# Patient Record
Sex: FEMALE | Race: WHITE | NOT HISPANIC OR LATINO | Employment: OTHER | URBAN - METROPOLITAN AREA
[De-identification: names, ages, dates, MRNs, and addresses within clinical notes are randomized per-mention and may not be internally consistent; named-entity substitution may affect disease eponyms.]

---

## 2017-01-18 ENCOUNTER — ANESTHESIA EVENT (OUTPATIENT)
Dept: GASTROENTEROLOGY | Facility: AMBULARY SURGERY CENTER | Age: 67
End: 2017-01-18
Payer: MEDICARE

## 2017-01-19 ENCOUNTER — ANESTHESIA (OUTPATIENT)
Dept: GASTROENTEROLOGY | Facility: AMBULARY SURGERY CENTER | Age: 67
End: 2017-01-19
Payer: MEDICARE

## 2017-01-19 ENCOUNTER — HOSPITAL ENCOUNTER (OUTPATIENT)
Facility: AMBULARY SURGERY CENTER | Age: 67
Setting detail: OUTPATIENT SURGERY
Discharge: NURSING, REHAB OR LONG-TERM CARE | End: 2017-01-19
Attending: INTERNAL MEDICINE | Admitting: INTERNAL MEDICINE
Payer: MEDICARE

## 2017-01-19 VITALS
HEIGHT: 65 IN | RESPIRATION RATE: 18 BRPM | HEART RATE: 57 BPM | SYSTOLIC BLOOD PRESSURE: 114 MMHG | OXYGEN SATURATION: 93 % | DIASTOLIC BLOOD PRESSURE: 63 MMHG | BODY MASS INDEX: 41.65 KG/M2 | WEIGHT: 250 LBS | TEMPERATURE: 96.2 F

## 2017-01-19 RX ORDER — PROPOFOL 10 MG/ML
INJECTION, EMULSION INTRAVENOUS AS NEEDED
Status: DISCONTINUED | OUTPATIENT
Start: 2017-01-19 | End: 2017-01-19 | Stop reason: SURG

## 2017-01-19 RX ORDER — TAMSULOSIN HYDROCHLORIDE 0.4 MG/1
0.8 CAPSULE ORAL
COMMUNITY

## 2017-01-19 RX ORDER — LEVOTHYROXINE SODIUM 137 UG/1
137 CAPSULE ORAL DAILY
COMMUNITY
End: 2018-08-28 | Stop reason: HOSPADM

## 2017-01-19 RX ORDER — ARIPIPRAZOLE 30 MG/1
30 TABLET ORAL EVERY MORNING
COMMUNITY
End: 2019-01-24

## 2017-01-19 RX ORDER — LISINOPRIL 10 MG/1
10 TABLET ORAL DAILY
COMMUNITY
End: 2018-08-18

## 2017-01-19 RX ORDER — DIVALPROEX SODIUM 250 MG/1
250 TABLET, EXTENDED RELEASE ORAL 2 TIMES DAILY
COMMUNITY

## 2017-01-19 RX ORDER — NITROFURANTOIN MACROCRYSTALS 100 MG/1
100 CAPSULE ORAL DAILY
COMMUNITY
End: 2018-08-18

## 2017-01-19 RX ORDER — PREGABALIN 50 MG/1
50 CAPSULE ORAL 2 TIMES DAILY
COMMUNITY

## 2017-01-19 RX ORDER — SODIUM CHLORIDE, SODIUM LACTATE, POTASSIUM CHLORIDE, CALCIUM CHLORIDE 600; 310; 30; 20 MG/100ML; MG/100ML; MG/100ML; MG/100ML
125 INJECTION, SOLUTION INTRAVENOUS CONTINUOUS
Status: DISCONTINUED | OUTPATIENT
Start: 2017-01-19 | End: 2017-01-19 | Stop reason: HOSPADM

## 2017-01-19 RX ORDER — DOCUSATE SODIUM 100 MG/1
200 CAPSULE, LIQUID FILLED ORAL
COMMUNITY

## 2017-01-19 RX ORDER — FENTANYL CITRATE 50 UG/ML
INJECTION, SOLUTION INTRAMUSCULAR; INTRAVENOUS AS NEEDED
Status: DISCONTINUED | OUTPATIENT
Start: 2017-01-19 | End: 2017-01-19 | Stop reason: SURG

## 2017-01-19 RX ORDER — AMOXICILLIN 250 MG
2 CAPSULE ORAL DAILY
COMMUNITY

## 2017-01-19 RX ORDER — ATORVASTATIN CALCIUM 10 MG/1
10 TABLET, FILM COATED ORAL DAILY
COMMUNITY

## 2017-01-19 RX ORDER — POLYETHYLENE GLYCOL 3350 17 G/17G
17 POWDER, FOR SOLUTION ORAL DAILY
COMMUNITY

## 2017-01-19 RX ORDER — DIVALPROEX SODIUM 250 MG/1
500 TABLET, EXTENDED RELEASE ORAL
COMMUNITY

## 2017-01-19 RX ORDER — AMIODARONE HYDROCHLORIDE 100 MG/1
50 TABLET ORAL DAILY
COMMUNITY
End: 2018-08-28 | Stop reason: HOSPADM

## 2017-01-19 RX ORDER — WARFARIN SODIUM 3 MG/1
3 TABLET ORAL
COMMUNITY
End: 2018-03-06

## 2017-01-19 RX ADMIN — FENTANYL CITRATE 50 MCG: 50 INJECTION, SOLUTION INTRAMUSCULAR; INTRAVENOUS at 11:53

## 2017-01-19 RX ADMIN — PROPOFOL 100 MG: 10 INJECTION, EMULSION INTRAVENOUS at 11:46

## 2017-01-19 RX ADMIN — PROPOFOL 50 MG: 10 INJECTION, EMULSION INTRAVENOUS at 11:55

## 2017-01-19 RX ADMIN — LIDOCAINE HYDROCHLORIDE 40 MG: 20 INJECTION, SOLUTION INTRAVENOUS at 11:46

## 2017-01-19 RX ADMIN — SODIUM CHLORIDE, SODIUM LACTATE, POTASSIUM CHLORIDE, AND CALCIUM CHLORIDE 125 ML/HR: .6; .31; .03; .02 INJECTION, SOLUTION INTRAVENOUS at 11:36

## 2017-01-19 RX ADMIN — FENTANYL CITRATE 50 MCG: 50 INJECTION, SOLUTION INTRAMUSCULAR; INTRAVENOUS at 11:45

## 2017-01-20 ENCOUNTER — TRANSCRIBE ORDERS (OUTPATIENT)
Dept: ADMINISTRATIVE | Facility: HOSPITAL | Age: 67
End: 2017-01-20

## 2017-01-20 DIAGNOSIS — R93.89 ABNORMAL MRI: Primary | ICD-10-CM

## 2017-01-26 ENCOUNTER — HOSPITAL ENCOUNTER (OUTPATIENT)
Dept: RADIOLOGY | Facility: HOSPITAL | Age: 67
Discharge: HOME | End: 2017-01-26
Attending: FAMILY MEDICINE
Payer: MEDICARE

## 2017-01-26 DIAGNOSIS — R93.89 ABNORMAL MRI: ICD-10-CM

## 2017-01-26 PROCEDURE — A9503 TC99M MEDRONATE: HCPCS

## 2017-01-26 PROCEDURE — 78315 BONE IMAGING 3 PHASE: CPT

## 2017-02-06 ENCOUNTER — ALLSCRIPTS OFFICE VISIT (OUTPATIENT)
Dept: OTHER | Facility: OTHER | Age: 67
End: 2017-02-06

## 2017-02-06 DIAGNOSIS — M89.9 DISORDER OF BONE: ICD-10-CM

## 2017-02-15 ENCOUNTER — HOSPITAL ENCOUNTER (OUTPATIENT)
Dept: RADIOLOGY | Age: 67
Discharge: HOME | End: 2017-02-15
Payer: MEDICARE

## 2017-02-15 DIAGNOSIS — R93.7 ABNORMAL MAGNETIC RESONANCE IMAGING OF THORACIC SPINE: ICD-10-CM

## 2017-02-15 PROCEDURE — 82948 REAGENT STRIP/BLOOD GLUCOSE: CPT

## 2017-02-15 PROCEDURE — 78815 PET IMAGE W/CT SKULL-THIGH: CPT

## 2017-02-15 PROCEDURE — A9552 F18 FDG: HCPCS

## 2017-02-15 RX ADMIN — IOHEXOL 5 ML: 240 INJECTION, SOLUTION INTRATHECAL; INTRAVASCULAR; INTRAVENOUS; ORAL at 10:15

## 2017-02-16 LAB — GLUCOSE SERPL-MCNC: 78 MG/DL (ref 65–140)

## 2017-03-13 ENCOUNTER — ALLSCRIPTS OFFICE VISIT (OUTPATIENT)
Dept: OTHER | Facility: OTHER | Age: 67
End: 2017-03-13

## 2017-03-13 DIAGNOSIS — D49.3 NEOPLASM OF BREAST: ICD-10-CM

## 2017-03-21 ENCOUNTER — HOSPITAL ENCOUNTER (OUTPATIENT)
Dept: RADIOLOGY | Facility: HOSPITAL | Age: 67
Discharge: HOME | End: 2017-03-21
Attending: INTERNAL MEDICINE
Payer: MEDICARE

## 2017-03-21 DIAGNOSIS — D49.3 NEOPLASM OF BREAST: ICD-10-CM

## 2017-03-21 PROCEDURE — G0202 SCR MAMMO BI INCL CAD: HCPCS

## 2017-03-22 ENCOUNTER — GENERIC CONVERSION - ENCOUNTER (OUTPATIENT)
Dept: OTHER | Facility: OTHER | Age: 67
End: 2017-03-22

## 2017-09-05 ENCOUNTER — ALLSCRIPTS OFFICE VISIT (OUTPATIENT)
Dept: OTHER | Facility: OTHER | Age: 67
End: 2017-09-05

## 2018-01-14 VITALS
RESPIRATION RATE: 16 BRPM | TEMPERATURE: 97.3 F | DIASTOLIC BLOOD PRESSURE: 80 MMHG | OXYGEN SATURATION: 96 % | HEART RATE: 90 BPM | SYSTOLIC BLOOD PRESSURE: 120 MMHG

## 2018-01-14 VITALS
SYSTOLIC BLOOD PRESSURE: 128 MMHG | TEMPERATURE: 97 F | OXYGEN SATURATION: 98 % | RESPIRATION RATE: 16 BRPM | DIASTOLIC BLOOD PRESSURE: 80 MMHG | HEART RATE: 60 BPM

## 2018-01-14 VITALS
TEMPERATURE: 96.7 F | DIASTOLIC BLOOD PRESSURE: 84 MMHG | HEART RATE: 79 BPM | OXYGEN SATURATION: 95 % | SYSTOLIC BLOOD PRESSURE: 124 MMHG | RESPIRATION RATE: 16 BRPM

## 2018-03-06 ENCOUNTER — OFFICE VISIT (OUTPATIENT)
Dept: HEMATOLOGY ONCOLOGY | Facility: MEDICAL CENTER | Age: 68
End: 2018-03-06
Payer: MEDICARE

## 2018-03-06 VITALS — SYSTOLIC BLOOD PRESSURE: 142 MMHG | TEMPERATURE: 95.3 F | DIASTOLIC BLOOD PRESSURE: 90 MMHG | HEART RATE: 86 BPM

## 2018-03-06 DIAGNOSIS — D63.8 ANEMIA IN OTHER CHRONIC DISEASES CLASSIFIED ELSEWHERE: Primary | ICD-10-CM

## 2018-03-06 PROBLEM — D64.9 ANEMIA: Status: ACTIVE | Noted: 2018-03-06

## 2018-03-06 PROCEDURE — 99214 OFFICE O/P EST MOD 30 MIN: CPT | Performed by: INTERNAL MEDICINE

## 2018-03-06 RX ORDER — AMPICILLIN TRIHYDRATE 500 MG
1 CAPSULE ORAL DAILY
COMMUNITY

## 2018-03-06 RX ORDER — AMLODIPINE BESYLATE 5 MG/1
5 TABLET ORAL DAILY
COMMUNITY
Start: 2018-02-22 | End: 2018-08-28 | Stop reason: HOSPADM

## 2018-03-06 NOTE — PROGRESS NOTES
Luis A Noland  [birth year]  [address] [facility] [phone]    DISCUSSION  SUMMARY:    76-year-old female with multiple medical problems recently found to have abnormalities on MRI T spine that were correlated with a bone scan  Issues:     1  T9 and 10 lesions seen previously  We had discussed biopsy in the past but patient refused  Pain is relatively controlled  Clarification of these lesions may come with # 2     2   Questionable left breast lesions  Patient has been scheduled for bilateral mammogram   The obvious concern would be breast cancer with metastases to the spine      3  CBC abnormalities, mild anemia, thrombocytopenia and mild leukopenia in the past  The most recent CBC was okay/acceptable but is approximately 10month-old  Repeat blood work has been requested      Mrs Darrell Willams is to return in 6 months  If Dr Elma Mccurdy or any other healthcare professional has any questions regarding this patient, my cell phone number is [phone]  Patient knows to call the hematology/oncology office if there are any other questions or concerns  Carefully review your medication list and verify that the list is accurate and up-to-date  Please call the hematology/oncology office if there are medications missing from the list, medications on the list that you are not currently taking or if there is a dosage or instruction that is different from how you're taking that medication  Patient goals and areas of care:  Monitor CBC, clarify questionable breast lesions  Patient is not able to self-care   _____________________________________________________________________________________    Chief Complaint   Patient presents with    Follow-up     Anemia, thrombocytopenia, mild leukopenia     History of Present Illness:    76-year-old female previously referred for the above  Mrs Darrell Willams has multiple sclerosis and is either bed or wheelchair bound  Patient is back for follow-up  As before, Mrs Jd Blanca is very interactive and is able to supply a good amount of her past medical history  Because of back pain, patient previously underwent an MRI of the spine  Results demonstrated abnormalities at T9 and T10  Patient subsequently underwent a bone scan also demonstrating abnormalities at T9 and T10  On previous visits, patient refused additional workup including a biopsy  More recently, patient felt an abnormal lesion in her left breast (patient has felt abnormalities in her breast in the past)  Back pain is minimal, same as before  Activities are extremely limited but no different than before  Appetite is okay, patient is trying to lose weight  No other GI,  or GYN issues  No fevers, chills or sweats  No problems with excessive bruising or bleeding  Review of Systems   Constitutional: Positive for fatigue  HENT: Negative  Eyes: Negative  Respiratory: Negative  Cardiovascular: Negative  Gastrointestinal: Negative  Endocrine: Negative  Genitourinary: Negative  Musculoskeletal: Positive for arthralgias  Skin: Negative  Allergic/Immunologic: Negative  Neurological: Negative  Hematological: Negative  Psychiatric/Behavioral: Negative  All other systems reviewed and are negative  Patient Active Problem List   Diagnosis    Anemia     Past Medical History:   Diagnosis Date    Bipolar disorder ([address]     Disease of thyroid gland     Heart failure ([address] )     afibrillation    Hyperlipidemia     Hypertension     Irregular heart beat     Multiple sclerosis ([address]     Paralysis ([address]     left hemiplegia    Schizophrenia ([address]     Stroke ([address]     left hemiplegia     Past Surgical History:   Procedure Laterality Date    BACK SURGERY      2    CHOLECYSTECTOMY      COLONOSCOPY N/A 1/19/2017    Procedure: COLONOSCOPY;  Surgeon: Anthony Macedo MD;  Location: [facility];   Service:    Nigel Jones ESOPHAGOGASTRODUODENOSCOPY N/A 1/19/2017    Procedure: ESOPHAGOGASTRODUODENOSCOPY (EGD); Surgeon: Lola Floyd MD;  Location: [facility]; Service:     EYE MUSCLE SURGERY Left     HYSTERECTOMY      TONSILLECTOMY       No family history on file  Social History     Social History    Marital status:      Spouse name: N/A    Number of children: N/A    Years of education: N/A     Occupational History    Not on file       Social History Main Topics    Smoking status: Never Smoker    Smokeless tobacco: Not on file    Alcohol use No    Drug use: No    Sexual activity: Not on file     Other Topics Concern    Not on file     Social History Narrative    No narrative on file       Current Outpatient Prescriptions:     Cranberry 450 MG CAPS, Take by mouth, Disp: , Rfl:     rivaroxaban (XARELTO) 15 mg tablet, Take 15 mg by mouth, Disp: , Rfl:     amiodarone 100 mg tablet, Take 100 mg by mouth daily, Disp: , Rfl:     amLODIPine (NORVASC) 5 mg tablet, , Disp: , Rfl:     ARIPiprazole (ABILIFY) 30 mg tablet, Take 30 mg by mouth daily, Disp: , Rfl:     atorvastatin (LIPITOR) 10 mg tablet, Take 10 mg by mouth daily, Disp: , Rfl:     divalproex sodium (DEPAKOTE ER) 250 mg 24 hr tablet, Take 250 mg by mouth daily, Disp: , Rfl:     divalproex sodium (DEPAKOTE ER) 500 mg 24 hr tablet, Take 500 mg by mouth daily, Disp: , Rfl:     docusate sodium (COLACE) 100 mg capsule, Take 200 mg by mouth daily at bedtime, Disp: , Rfl:     levothyroxine 100 mcg tablet, Take 100 mcg by mouth daily, Disp: , Rfl:     lisinopril (ZESTRIL) 10 mg tablet, Take 10 mg by mouth daily, Disp: , Rfl:     nitrofurantoin (MACRODANTIN) 100 mg capsule, Take 100 mg by mouth daily, Disp: , Rfl:     polyethylene glycol (MIRALAX) 17 g packet, Take 17 g by mouth daily, Disp: , Rfl:     pregabalin (LYRICA) 50 mg capsule, Take 50 mg by mouth 2 (two) times a day, Disp: , Rfl:     senna-docusate sodium (SENOKOT S) 8 6-50 mg per tablet, Take 1 tablet by mouth daily, Disp: , Rfl:     tamsulosin (FLOMAX) 0 4 mg, Take 0 8 mg by mouth daily at bedtime, Disp: , Rfl:     Allergies   Allergen Reactions    Ciprocinonide [Fluocinolone] Itching    Darvon [Propoxyphene] Itching    Keflex [Cephalexin] Rash    Lithium Itching    Milk-Related Compounds GI Intolerance    Penicillins Itching    Sulfa Antibiotics Hives    Zithromax [Azithromycin] Rash    Ethylenediamine Tetra-Acetate [Edetic Acid] Itching       Vitals:    03/06/18 1425   BP: 142/90   Pulse: 86   Temp: (!) 95 3 °F (35 2 °C)     Physical Exam   Constitutional: She appears well-developed and well-nourished  Obese female, no respiratory distress   HENT:   Head: Normocephalic and atraumatic  Right Ear: External ear normal    Left Ear: External ear normal    Nose: Nose normal    Mouth/Throat: Oropharynx is clear and moist    Eyes: Conjunctivae and EOM are normal  Pupils are equal, round, and reactive to light  Neck: Normal range of motion  Neck supple  Cardiovascular: Normal rate, regular rhythm, normal heart sounds and intact distal pulses  Pulmonary/Chest: Effort normal and breath sounds normal    Lungs with distant breath sounds bilaterally, few scattered rhonchi   Abdominal: Soft  Bowel sounds are normal    Abdomen is soft, morbidly obese, cannot palpate liver or spleen, no rigidity or rebound, +bowel sounds   Musculoskeletal: Normal range of motion  Neurological:   Motor and sensory is significantly decreased in all extremities, patient's mental status is quite clear although reaction time is slightly delayed, speech is slightly off but not slurred   Skin: Skin is warm  Skin is warm, moist, no petechiae or ecchymoses   Psychiatric: She has a normal mood and affect   Her behavior is normal  Judgment and thought content normal    Breasts:  (Female present) right breast without masses, retraction, redness or dimpling, left breast with irregular lumpiness throughout the breast, no retraction, no redness, breast exam was slightly tender, no axillary adenopathy bilaterally  Extremities:  1+ bilateral lower extremity edema, no cords, pulses are 1+  Lymphatics:  No adenopathy in the neck, supraclavicular region, axilla and groin bilaterally    Labs:     8/31/17 INR = 2 7  8/17/17 WBC = 5 2 hemoglobin = 13 7 hematocrit = 42 platelet = 128  1/9/69 platelet = 62  4/32/06 WBC = 3 6 hemoglobin = 12 9 hematocrit = 39 platelet = 98    Imaging    3/21/17 bilateral mammogram was category 2, benign      1/26/17 three-phase bone scan whole body demonstrated focal radiotracer activity in the lower thoracic spine corresponding to prior MRI abnormalities  Radiologist stated that the findings were nonspecific and inflammation/infection or metastases were possibilities  There were no additional lesions that were concerning for osseous metastatic disease      12/30/16 MRI thoracic spine: No intrinsic spinal cord disease was identified  Patient had advanced multilevel degenerative spondylosis  There was abnormal marrow at T10 and to a lesser degree T9 suspicious for metastatic disease

## 2018-03-22 ENCOUNTER — TRANSCRIBE ORDERS (OUTPATIENT)
Dept: ADMINISTRATIVE | Facility: HOSPITAL | Age: 68
End: 2018-03-22

## 2018-03-22 ENCOUNTER — HOSPITAL ENCOUNTER (OUTPATIENT)
Dept: RADIOLOGY | Facility: HOSPITAL | Age: 68
Discharge: HOME | End: 2018-03-22
Attending: INTERNAL MEDICINE
Payer: MEDICARE

## 2018-03-22 ENCOUNTER — HOSPITAL ENCOUNTER (OUTPATIENT)
Dept: RADIOLOGY | Facility: HOSPITAL | Age: 68
Discharge: HOME | End: 2018-03-22
Payer: MEDICARE

## 2018-03-22 DIAGNOSIS — N64.9 LESION OF BREAST: ICD-10-CM

## 2018-03-22 DIAGNOSIS — R92.8 ABNORMAL MAMMOGRAM: Primary | ICD-10-CM

## 2018-03-22 DIAGNOSIS — N64.9 LESION OF BREAST: Primary | ICD-10-CM

## 2018-03-22 PROCEDURE — 77066 DX MAMMO INCL CAD BI: CPT

## 2018-03-22 PROCEDURE — 76642 ULTRASOUND BREAST LIMITED: CPT

## 2018-04-12 ENCOUNTER — OFFICE VISIT (OUTPATIENT)
Dept: HEMATOLOGY ONCOLOGY | Facility: MEDICAL CENTER | Age: 68
End: 2018-04-12
Payer: MEDICARE

## 2018-04-12 VITALS
RESPIRATION RATE: 16 BRPM | TEMPERATURE: 95.3 F | DIASTOLIC BLOOD PRESSURE: 92 MMHG | SYSTOLIC BLOOD PRESSURE: 140 MMHG | HEART RATE: 87 BPM

## 2018-04-12 DIAGNOSIS — D63.8 ANEMIA IN OTHER CHRONIC DISEASES CLASSIFIED ELSEWHERE: Primary | ICD-10-CM

## 2018-04-12 PROCEDURE — 99214 OFFICE O/P EST MOD 30 MIN: CPT | Performed by: INTERNAL MEDICINE

## 2018-08-06 ENCOUNTER — HOSPITAL ENCOUNTER (OUTPATIENT)
Dept: RADIOLOGY | Facility: HOSPITAL | Age: 68
Discharge: HOME | End: 2018-08-06
Attending: INTERNAL MEDICINE
Payer: MEDICARE

## 2018-08-06 DIAGNOSIS — D63.8 ANEMIA IN OTHER CHRONIC DISEASES CLASSIFIED ELSEWHERE: ICD-10-CM

## 2018-08-06 PROCEDURE — 72146 MRI CHEST SPINE W/O DYE: CPT

## 2018-08-07 ENCOUNTER — TELEPHONE (OUTPATIENT)
Dept: HEMATOLOGY ONCOLOGY | Facility: MEDICAL CENTER | Age: 68
End: 2018-08-07

## 2018-08-18 ENCOUNTER — HOSPITAL ENCOUNTER (INPATIENT)
Facility: HOSPITAL | Age: 68
LOS: 10 days | Discharge: NURSING, REHAB OR LONG-TERM CARE | DRG: 871 | End: 2018-08-28
Attending: EMERGENCY MEDICINE | Admitting: INTERNAL MEDICINE
Payer: MEDICARE

## 2018-08-18 ENCOUNTER — APPOINTMENT (EMERGENCY)
Dept: RADIOLOGY | Facility: HOSPITAL | Age: 68
DRG: 871 | End: 2018-08-18
Payer: MEDICARE

## 2018-08-18 ENCOUNTER — APPOINTMENT (INPATIENT)
Dept: RADIOLOGY | Facility: HOSPITAL | Age: 68
DRG: 871 | End: 2018-08-18
Payer: MEDICARE

## 2018-08-18 DIAGNOSIS — T68.XXXA HYPOTHERMIA: ICD-10-CM

## 2018-08-18 DIAGNOSIS — A41.9 SEPSIS, DUE TO UNSPECIFIED ORGANISM: ICD-10-CM

## 2018-08-18 DIAGNOSIS — J81.1 PULMONARY EDEMA: Primary | ICD-10-CM

## 2018-08-18 DIAGNOSIS — G35 MULTIPLE SCLEROSIS (HCC): Chronic | ICD-10-CM

## 2018-08-18 DIAGNOSIS — J96.02 ACUTE HYPERCAPNIC RESPIRATORY FAILURE (HCC): ICD-10-CM

## 2018-08-18 DIAGNOSIS — D69.6 THROMBOCYTOPENIA (HCC): ICD-10-CM

## 2018-08-18 DIAGNOSIS — D72.819 LEUKOPENIA: ICD-10-CM

## 2018-08-18 DIAGNOSIS — R21 RASH: ICD-10-CM

## 2018-08-18 DIAGNOSIS — E03.9 HYPOTHYROIDISM: ICD-10-CM

## 2018-08-18 DIAGNOSIS — E55.9 VITAMIN D DEFICIENCY: ICD-10-CM

## 2018-08-18 DIAGNOSIS — G92.8 TOXIC METABOLIC ENCEPHALOPATHY: ICD-10-CM

## 2018-08-18 DIAGNOSIS — I50.33 ACUTE ON CHRONIC DIASTOLIC CONGESTIVE HEART FAILURE (HCC): ICD-10-CM

## 2018-08-18 PROBLEM — R65.20 SEVERE SEPSIS (HCC): Status: ACTIVE | Noted: 2018-08-18

## 2018-08-18 PROBLEM — I50.9 ACUTE CONGESTIVE HEART FAILURE (HCC): Status: ACTIVE | Noted: 2018-08-18

## 2018-08-18 PROBLEM — J81.0 ACUTE PULMONARY EDEMA (HCC): Status: ACTIVE | Noted: 2018-08-18

## 2018-08-18 PROBLEM — D69.1 THROMBOCYTOPATHIA (HCC): Status: ACTIVE | Noted: 2018-08-18

## 2018-08-18 PROBLEM — G40.909 SEIZURE DISORDER (HCC): Status: ACTIVE | Noted: 2018-08-18

## 2018-08-18 PROBLEM — J96.01 ACUTE RESPIRATORY FAILURE WITH HYPOXIA AND HYPERCAPNIA (HCC): Status: ACTIVE | Noted: 2018-08-18

## 2018-08-18 PROBLEM — J90 BILATERAL PLEURAL EFFUSION: Status: ACTIVE | Noted: 2018-08-18

## 2018-08-18 PROBLEM — R00.1 BRADYCARDIA: Status: ACTIVE | Noted: 2018-08-18

## 2018-08-18 PROBLEM — J18.9 HCAP (HEALTHCARE-ASSOCIATED PNEUMONIA): Status: ACTIVE | Noted: 2018-08-18

## 2018-08-18 LAB
ALBUMIN SERPL BCP-MCNC: 3.7 G/DL (ref 3.5–5)
ALP SERPL-CCNC: 141 U/L (ref 46–116)
ALT SERPL W P-5'-P-CCNC: 31 U/L (ref 12–78)
AMMONIA PLAS-SCNC: 16 UMOL/L (ref 11–35)
ANION GAP SERPL CALCULATED.3IONS-SCNC: 9 MMOL/L (ref 4–13)
APTT PPP: 39 SECONDS (ref 24–36)
AST SERPL W P-5'-P-CCNC: 26 U/L (ref 5–45)
BACTERIA UR QL AUTO: ABNORMAL /HPF
BASE EX.OXY STD BLDV CALC-SCNC: 71.5 % (ref 60–80)
BASE EX.OXY STD BLDV CALC-SCNC: 95.2 % (ref 60–80)
BASE EXCESS BLDV CALC-SCNC: -3.4 MMOL/L
BASE EXCESS BLDV CALC-SCNC: -4 MMOL/L
BASOPHILS # BLD AUTO: 0.01 THOUSANDS/ΜL (ref 0–0.1)
BASOPHILS NFR BLD AUTO: 0 % (ref 0–1)
BILIRUB SERPL-MCNC: 0.7 MG/DL (ref 0.2–1)
BILIRUB UR QL STRIP: NEGATIVE
BUN SERPL-MCNC: 46 MG/DL (ref 5–25)
CALCIUM SERPL-MCNC: 9.6 MG/DL (ref 8.3–10.1)
CHLORIDE SERPL-SCNC: 112 MMOL/L (ref 100–108)
CLARITY UR: CLEAR
CO2 SERPL-SCNC: 27 MMOL/L (ref 21–32)
COLOR UR: YELLOW
CREAT SERPL-MCNC: 1.76 MG/DL (ref 0.6–1.3)
EOSINOPHIL # BLD AUTO: 0.05 THOUSAND/ΜL (ref 0–0.61)
EOSINOPHIL NFR BLD AUTO: 1 % (ref 0–6)
ERYTHROCYTE [DISTWIDTH] IN BLOOD BY AUTOMATED COUNT: 17.2 % (ref 11.6–15.1)
GFR SERPL CREATININE-BSD FRML MDRD: 29 ML/MIN/1.73SQ M
GLUCOSE SERPL-MCNC: 103 MG/DL (ref 65–140)
GLUCOSE SERPL-MCNC: 78 MG/DL (ref 65–140)
GLUCOSE UR STRIP-MCNC: NEGATIVE MG/DL
HCO3 BLDV-SCNC: 22.4 MMOL/L (ref 24–30)
HCO3 BLDV-SCNC: 24.5 MMOL/L (ref 24–30)
HCT VFR BLD AUTO: 37.1 % (ref 34.8–46.1)
HGB BLD-MCNC: 11.4 G/DL (ref 11.5–15.4)
HGB UR QL STRIP.AUTO: NEGATIVE
IMM GRANULOCYTES # BLD AUTO: 0.02 THOUSAND/UL (ref 0–0.2)
IMM GRANULOCYTES NFR BLD AUTO: 1 % (ref 0–2)
INR PPP: 1.14 (ref 0.86–1.16)
KETONES UR STRIP-MCNC: NEGATIVE MG/DL
LACTATE SERPL-SCNC: 0.9 MMOL/L (ref 0.5–2)
LEUKOCYTE ESTERASE UR QL STRIP: ABNORMAL
LYMPHOCYTES # BLD AUTO: 0.69 THOUSANDS/ΜL (ref 0.6–4.47)
LYMPHOCYTES NFR BLD AUTO: 19 % (ref 14–44)
MCH RBC QN AUTO: 30.6 PG (ref 26.8–34.3)
MCHC RBC AUTO-ENTMCNC: 30.7 G/DL (ref 31.4–37.4)
MCV RBC AUTO: 100 FL (ref 82–98)
MONOCYTES # BLD AUTO: 0.3 THOUSAND/ΜL (ref 0.17–1.22)
MONOCYTES NFR BLD AUTO: 8 % (ref 4–12)
NEUTROPHILS # BLD AUTO: 2.5 THOUSANDS/ΜL (ref 1.85–7.62)
NEUTS SEG NFR BLD AUTO: 71 % (ref 43–75)
NITRITE UR QL STRIP: NEGATIVE
NON-SQ EPI CELLS URNS QL MICRO: ABNORMAL /HPF
NRBC BLD AUTO-RTO: 1 /100 WBCS
NT-PROBNP SERPL-MCNC: 548 PG/ML
O2 CT BLDV-SCNC: 12.6 ML/DL
O2 CT BLDV-SCNC: 14.4 ML/DL
PCO2 BLDV: 46.8 MM HG (ref 42–50)
PCO2 BLDV: 57.4 MM HG (ref 42–50)
PH BLDV: 7.25 [PH] (ref 7.3–7.4)
PH BLDV: 7.3 [PH] (ref 7.3–7.4)
PH UR STRIP.AUTO: 6 [PH] (ref 5–9)
PLATELET # BLD AUTO: 47 THOUSANDS/UL (ref 149–390)
PMV BLD AUTO: 12.8 FL (ref 8.9–12.7)
PO2 BLDV: 119.2 MM HG (ref 35–45)
PO2 BLDV: 46.2 MM HG (ref 35–45)
POTASSIUM SERPL-SCNC: 5.3 MMOL/L (ref 3.5–5.3)
PROT SERPL-MCNC: 7.5 G/DL (ref 6.4–8.2)
PROT UR STRIP-MCNC: NEGATIVE MG/DL
PROTHROMBIN TIME: 11.9 SECONDS (ref 9.4–11.7)
RBC # BLD AUTO: 3.72 MILLION/UL (ref 3.81–5.12)
RBC #/AREA URNS AUTO: ABNORMAL /HPF
SODIUM SERPL-SCNC: 148 MMOL/L (ref 136–145)
SP GR UR STRIP.AUTO: 1.01 (ref 1–1.03)
TROPONIN I SERPL-MCNC: <0.02 NG/ML
TSH SERPL DL<=0.05 MIU/L-ACNC: 7.8 UIU/ML (ref 0.36–3.74)
UROBILINOGEN UR QL STRIP.AUTO: 0.2 E.U./DL
VALPROATE SERPL-MCNC: 34 UG/ML (ref 50–100)
WBC # BLD AUTO: 3.57 THOUSAND/UL (ref 4.31–10.16)
WBC #/AREA URNS AUTO: ABNORMAL /HPF

## 2018-08-18 PROCEDURE — 82948 REAGENT STRIP/BLOOD GLUCOSE: CPT

## 2018-08-18 PROCEDURE — 94660 CPAP INITIATION&MGMT: CPT

## 2018-08-18 PROCEDURE — 85610 PROTHROMBIN TIME: CPT | Performed by: EMERGENCY MEDICINE

## 2018-08-18 PROCEDURE — 82805 BLOOD GASES W/O2 SATURATION: CPT

## 2018-08-18 PROCEDURE — 84484 ASSAY OF TROPONIN QUANT: CPT | Performed by: EMERGENCY MEDICINE

## 2018-08-18 PROCEDURE — 84443 ASSAY THYROID STIM HORMONE: CPT | Performed by: PHYSICIAN ASSISTANT

## 2018-08-18 PROCEDURE — 36415 COLL VENOUS BLD VENIPUNCTURE: CPT

## 2018-08-18 PROCEDURE — 84439 ASSAY OF FREE THYROXINE: CPT | Performed by: PHYSICIAN ASSISTANT

## 2018-08-18 PROCEDURE — 87086 URINE CULTURE/COLONY COUNT: CPT | Performed by: EMERGENCY MEDICINE

## 2018-08-18 PROCEDURE — 80164 ASSAY DIPROPYLACETIC ACD TOT: CPT | Performed by: PHYSICIAN ASSISTANT

## 2018-08-18 PROCEDURE — 83605 ASSAY OF LACTIC ACID: CPT

## 2018-08-18 PROCEDURE — 93005 ELECTROCARDIOGRAM TRACING: CPT

## 2018-08-18 PROCEDURE — 87040 BLOOD CULTURE FOR BACTERIA: CPT

## 2018-08-18 PROCEDURE — 81001 URINALYSIS AUTO W/SCOPE: CPT | Performed by: EMERGENCY MEDICINE

## 2018-08-18 PROCEDURE — 82140 ASSAY OF AMMONIA: CPT | Performed by: PHYSICIAN ASSISTANT

## 2018-08-18 PROCEDURE — 99291 CRITICAL CARE FIRST HOUR: CPT | Performed by: PHYSICIAN ASSISTANT

## 2018-08-18 PROCEDURE — 85730 THROMBOPLASTIN TIME PARTIAL: CPT | Performed by: EMERGENCY MEDICINE

## 2018-08-18 PROCEDURE — 71045 X-RAY EXAM CHEST 1 VIEW: CPT

## 2018-08-18 PROCEDURE — 82805 BLOOD GASES W/O2 SATURATION: CPT | Performed by: EMERGENCY MEDICINE

## 2018-08-18 PROCEDURE — 85025 COMPLETE CBC W/AUTO DIFF WBC: CPT | Performed by: EMERGENCY MEDICINE

## 2018-08-18 PROCEDURE — 96374 THER/PROPH/DIAG INJ IV PUSH: CPT

## 2018-08-18 PROCEDURE — 80053 COMPREHEN METABOLIC PANEL: CPT | Performed by: EMERGENCY MEDICINE

## 2018-08-18 PROCEDURE — 84681 ASSAY OF C-PEPTIDE: CPT | Performed by: PHYSICIAN ASSISTANT

## 2018-08-18 PROCEDURE — 83880 ASSAY OF NATRIURETIC PEPTIDE: CPT

## 2018-08-18 PROCEDURE — 96365 THER/PROPH/DIAG IV INF INIT: CPT

## 2018-08-18 PROCEDURE — 96375 TX/PRO/DX INJ NEW DRUG ADDON: CPT

## 2018-08-18 PROCEDURE — 87077 CULTURE AEROBIC IDENTIFY: CPT | Performed by: EMERGENCY MEDICINE

## 2018-08-18 PROCEDURE — 87186 SC STD MICRODIL/AGAR DIL: CPT | Performed by: EMERGENCY MEDICINE

## 2018-08-18 PROCEDURE — 87081 CULTURE SCREEN ONLY: CPT | Performed by: ANESTHESIOLOGY

## 2018-08-18 PROCEDURE — 70450 CT HEAD/BRAIN W/O DYE: CPT

## 2018-08-18 PROCEDURE — 87449 NOS EACH ORGANISM AG IA: CPT | Performed by: PHYSICIAN ASSISTANT

## 2018-08-18 PROCEDURE — 84145 PROCALCITONIN (PCT): CPT | Performed by: PHYSICIAN ASSISTANT

## 2018-08-18 PROCEDURE — 99291 CRITICAL CARE FIRST HOUR: CPT

## 2018-08-18 RX ORDER — ATORVASTATIN CALCIUM 10 MG/1
10 TABLET, FILM COATED ORAL DAILY
Status: DISCONTINUED | OUTPATIENT
Start: 2018-08-19 | End: 2018-08-28 | Stop reason: HOSPADM

## 2018-08-18 RX ORDER — DIVALPROEX SODIUM 500 MG/1
500 TABLET, EXTENDED RELEASE ORAL
Status: DISCONTINUED | OUTPATIENT
Start: 2018-08-18 | End: 2018-08-28 | Stop reason: HOSPADM

## 2018-08-18 RX ORDER — LEVOTHYROXINE SODIUM 137 UG/1
137 TABLET ORAL
Status: DISCONTINUED | OUTPATIENT
Start: 2018-08-19 | End: 2018-08-19 | Stop reason: CLARIF

## 2018-08-18 RX ORDER — ACETAMINOPHEN 325 MG/1
650 TABLET ORAL EVERY 6 HOURS PRN
COMMUNITY

## 2018-08-18 RX ORDER — LEVOFLOXACIN 5 MG/ML
750 INJECTION, SOLUTION INTRAVENOUS ONCE
Status: COMPLETED | OUTPATIENT
Start: 2018-08-18 | End: 2018-08-18

## 2018-08-18 RX ORDER — VANCOMYCIN HYDROCHLORIDE 1 G/200ML
1000 INJECTION, SOLUTION INTRAVENOUS ONCE
Status: COMPLETED | OUTPATIENT
Start: 2018-08-18 | End: 2018-08-18

## 2018-08-18 RX ORDER — THIAMINE MONONITRATE (VIT B1) 100 MG
100 TABLET ORAL DAILY
COMMUNITY
End: 2019-01-24

## 2018-08-18 RX ORDER — ARIPIPRAZOLE 10 MG/1
30 TABLET ORAL DAILY
Status: DISCONTINUED | OUTPATIENT
Start: 2018-08-19 | End: 2018-08-19

## 2018-08-18 RX ORDER — NITROGLYCERIN 20 MG/100ML
5-200 INJECTION INTRAVENOUS
Status: DISCONTINUED | OUTPATIENT
Start: 2018-08-18 | End: 2018-08-18

## 2018-08-18 RX ORDER — DIVALPROEX SODIUM 250 MG/1
250 TABLET, EXTENDED RELEASE ORAL DAILY
Status: DISCONTINUED | OUTPATIENT
Start: 2018-08-19 | End: 2018-08-28 | Stop reason: HOSPADM

## 2018-08-18 RX ORDER — THIAMINE MONONITRATE (VIT B1) 100 MG
100 TABLET ORAL DAILY
Status: DISCONTINUED | OUTPATIENT
Start: 2018-08-19 | End: 2018-08-28 | Stop reason: HOSPADM

## 2018-08-18 RX ORDER — AMIODARONE HYDROCHLORIDE 100 MG/1
50 TABLET ORAL DAILY
Status: DISCONTINUED | OUTPATIENT
Start: 2018-08-19 | End: 2018-08-18

## 2018-08-18 RX ORDER — POLYVINYL ALCOHOL 14 MG/ML
1 SOLUTION/ DROPS OPHTHALMIC 2 TIMES DAILY
COMMUNITY
End: 2018-09-12

## 2018-08-18 RX ORDER — FUROSEMIDE 10 MG/ML
40 INJECTION INTRAMUSCULAR; INTRAVENOUS ONCE
Status: COMPLETED | OUTPATIENT
Start: 2018-08-18 | End: 2018-08-18

## 2018-08-18 RX ORDER — ALBUMIN, HUMAN INJ 5% 5 %
12.5 SOLUTION INTRAVENOUS ONCE
Status: COMPLETED | OUTPATIENT
Start: 2018-08-18 | End: 2018-08-18

## 2018-08-18 RX ADMIN — METRONIDAZOLE 500 MG: 500 INJECTION, SOLUTION INTRAVENOUS at 22:04

## 2018-08-18 RX ADMIN — VANCOMYCIN HYDROCHLORIDE 1000 MG: 1 INJECTION, SOLUTION INTRAVENOUS at 20:23

## 2018-08-18 RX ADMIN — CEFEPIME HYDROCHLORIDE 2000 MG: 2 INJECTION, POWDER, FOR SOLUTION INTRAVENOUS at 23:13

## 2018-08-18 RX ADMIN — ALBUMIN HUMAN 12.5 G: 0.05 INJECTION, SOLUTION INTRAVENOUS at 22:12

## 2018-08-18 RX ADMIN — FUROSEMIDE 40 MG: 10 INJECTION, SOLUTION INTRAMUSCULAR; INTRAVENOUS at 20:20

## 2018-08-18 RX ADMIN — LEVOFLOXACIN 750 MG: 5 INJECTION, SOLUTION INTRAVENOUS at 20:13

## 2018-08-18 RX ADMIN — NITROGLYCERIN 5 MCG/MIN: 20 INJECTION INTRAVENOUS at 19:32

## 2018-08-18 RX ADMIN — SODIUM CHLORIDE 1000 ML: 0.9 INJECTION, SOLUTION INTRAVENOUS at 19:35

## 2018-08-18 NOTE — ED NOTES
Chest xray done at bedside  Client placed on bipap upon arrival     Gudeliadanny NavarroEn[facility]  08/18/18 [id number]

## 2018-08-18 NOTE — ED NOTES
MD aware that fluids are only 1000cc ordered and if client is indeed septic protocol calls for more  MD ordered to hold off now     Kimi Pedroza, JACQUELIN  08/18/18 1956

## 2018-08-19 ENCOUNTER — APPOINTMENT (INPATIENT)
Dept: RADIOLOGY | Facility: HOSPITAL | Age: 68
DRG: 871 | End: 2018-08-19
Payer: MEDICARE

## 2018-08-19 PROBLEM — G40.909 SEIZURE DISORDER (HCC): Chronic | Status: ACTIVE | Noted: 2018-08-18

## 2018-08-19 PROBLEM — G35 MULTIPLE SCLEROSIS (HCC): Chronic | Status: ACTIVE | Noted: 2018-08-18

## 2018-08-19 PROBLEM — M89.9 BONE LESION: Status: ACTIVE | Noted: 2017-09-05

## 2018-08-19 LAB
ALBUMIN SERPL BCP-MCNC: 3.3 G/DL (ref 3.5–5)
ALP SERPL-CCNC: 111 U/L (ref 46–116)
ALT SERPL W P-5'-P-CCNC: 27 U/L (ref 12–78)
ANION GAP SERPL CALCULATED.3IONS-SCNC: 10 MMOL/L (ref 4–13)
ARTERIAL PATENCY WRIST A: YES
AST SERPL W P-5'-P-CCNC: 21 U/L (ref 5–45)
BASE EXCESS BLDA CALC-SCNC: -1.6 MMOL/L
BASE EXCESS BLDA CALC-SCNC: -1.8 MMOL/L
BASE EXCESS BLDA CALC-SCNC: -2.6 MMOL/L
BASE EXCESS BLDA CALC-SCNC: -3.1 MMOL/L
BASOPHILS # BLD AUTO: 0.01 THOUSANDS/ΜL (ref 0–0.1)
BASOPHILS NFR BLD AUTO: 0 % (ref 0–1)
BILIRUB SERPL-MCNC: 0.6 MG/DL (ref 0.2–1)
BODY TEMPERATURE: 93.8 DEGREES FEHRENHEIT
BODY TEMPERATURE: 96.2 DEGREES FEHRENHEIT
BODY TEMPERATURE: 96.6 DEGREES FEHRENHEIT
BODY TEMPERATURE: 99.2 DEGREES FEHRENHEIT
BUN SERPL-MCNC: 46 MG/DL (ref 5–25)
CALCIUM SERPL-MCNC: 8.8 MG/DL (ref 8.3–10.1)
CHLORIDE SERPL-SCNC: 114 MMOL/L (ref 100–108)
CO2 SERPL-SCNC: 25 MMOL/L (ref 21–32)
CREAT SERPL-MCNC: 1.68 MG/DL (ref 0.6–1.3)
EOSINOPHIL # BLD AUTO: 0.03 THOUSAND/ΜL (ref 0–0.61)
EOSINOPHIL NFR BLD AUTO: 1 % (ref 0–6)
ERYTHROCYTE [DISTWIDTH] IN BLOOD BY AUTOMATED COUNT: 17.2 % (ref 11.6–15.1)
GFR SERPL CREATININE-BSD FRML MDRD: 31 ML/MIN/1.73SQ M
GLUCOSE SERPL-MCNC: 118 MG/DL (ref 65–140)
GLUCOSE SERPL-MCNC: 127 MG/DL (ref 65–140)
GLUCOSE SERPL-MCNC: 134 MG/DL (ref 65–140)
GLUCOSE SERPL-MCNC: 49 MG/DL (ref 65–140)
GLUCOSE SERPL-MCNC: 50 MG/DL (ref 65–140)
GLUCOSE SERPL-MCNC: 51 MG/DL (ref 65–140)
GLUCOSE SERPL-MCNC: 64 MG/DL (ref 65–140)
GLUCOSE SERPL-MCNC: 64 MG/DL (ref 65–140)
GLUCOSE SERPL-MCNC: 66 MG/DL (ref 65–140)
GLUCOSE SERPL-MCNC: 67 MG/DL (ref 65–140)
GLUCOSE SERPL-MCNC: 70 MG/DL (ref 65–140)
GLUCOSE SERPL-MCNC: 73 MG/DL (ref 65–140)
GLUCOSE SERPL-MCNC: 79 MG/DL (ref 65–140)
GLUCOSE SERPL-MCNC: 82 MG/DL (ref 65–140)
GLUCOSE SERPL-MCNC: 83 MG/DL (ref 65–140)
GLUCOSE SERPL-MCNC: 91 MG/DL (ref 65–140)
HCO3 BLDA-SCNC: 23.6 MMOL/L (ref 22–28)
HCO3 BLDA-SCNC: 23.7 MMOL/L (ref 22–28)
HCO3 BLDA-SCNC: 23.9 MMOL/L (ref 22–28)
HCO3 BLDA-SCNC: 24.2 MMOL/L (ref 22–28)
HCT VFR BLD AUTO: 32.6 % (ref 34.8–46.1)
HGB BLD-MCNC: 10.1 G/DL (ref 11.5–15.4)
IMM GRANULOCYTES # BLD AUTO: 0.02 THOUSAND/UL (ref 0–0.2)
IMM GRANULOCYTES NFR BLD AUTO: 1 % (ref 0–2)
IPAP: 12
IPAP: 14
L PNEUMO1 AG UR QL IA.RAPID: NEGATIVE
LYMPHOCYTES # BLD AUTO: 0.58 THOUSANDS/ΜL (ref 0.6–4.47)
LYMPHOCYTES NFR BLD AUTO: 21 % (ref 14–44)
MAGNESIUM SERPL-MCNC: 1.8 MG/DL (ref 1.6–2.6)
MCH RBC QN AUTO: 30.7 PG (ref 26.8–34.3)
MCHC RBC AUTO-ENTMCNC: 31 G/DL (ref 31.4–37.4)
MCV RBC AUTO: 99 FL (ref 82–98)
MONOCYTES # BLD AUTO: 0.33 THOUSAND/ΜL (ref 0.17–1.22)
MONOCYTES NFR BLD AUTO: 12 % (ref 4–12)
NASAL CANNULA: 2
NASAL CANNULA: ABNORMAL
NEUTROPHILS # BLD AUTO: 1.77 THOUSANDS/ΜL (ref 1.85–7.62)
NEUTS SEG NFR BLD AUTO: 65 % (ref 43–75)
NON VENT- BIPAP: ABNORMAL
NON VENT- BIPAP: ABNORMAL
NRBC BLD AUTO-RTO: 1 /100 WBCS
O2 CT BLDA-SCNC: 14.1 ML/DL (ref 16–23)
O2 CT BLDA-SCNC: 14.2 ML/DL (ref 16–23)
O2 CT BLDA-SCNC: 14.4 ML/DL (ref 16–23)
O2 CT BLDA-SCNC: 14.8 ML/DL (ref 16–23)
OXYHGB MFR BLDA: 92.4 % (ref 94–97)
OXYHGB MFR BLDA: 92.5 % (ref 94–97)
OXYHGB MFR BLDA: 94.7 % (ref 94–97)
OXYHGB MFR BLDA: 95.2 % (ref 94–97)
PCO2 BLDA: 43.5 MM HG (ref 36–44)
PCO2 BLDA: 46.6 MM HG (ref 36–44)
PCO2 BLDA: 46.9 MM HG (ref 36–44)
PCO2 BLDA: 50.5 MM HG (ref 36–44)
PCO2 TEMP ADJ BLDA: 41.5 MM HG (ref 36–44)
PCO2 TEMP ADJ BLDA: 44.3 MM HG (ref 36–44)
PCO2 TEMP ADJ BLDA: 44.9 MM HG (ref 36–44)
PCO2 TEMP ADJ BLDA: 47.2 MM HG (ref 36–44)
PEEP MAX SETTING VENT: 5 CM[H2O]
PEEP MAX SETTING VENT: 6 CM[H2O]
PH BLD: 7.33 [PH] (ref 7.35–7.45)
PH BLD: 7.33 [PH] (ref 7.35–7.45)
PH BLD: 7.34 [PH] (ref 7.35–7.45)
PH BLD: 7.37 [PH] (ref 7.35–7.45)
PH BLDA: 7.29 [PH] (ref 7.35–7.45)
PH BLDA: 7.32 [PH] (ref 7.35–7.45)
PH BLDA: 7.33 [PH] (ref 7.35–7.45)
PH BLDA: 7.36 [PH] (ref 7.35–7.45)
PHOSPHATE SERPL-MCNC: 3.8 MG/DL (ref 2.3–4.1)
PLATELET # BLD AUTO: 52 THOUSANDS/UL (ref 149–390)
PMV BLD AUTO: 13 FL (ref 8.9–12.7)
PO2 BLD: 69.7 MM HG (ref 75–129)
PO2 BLD: 77 MM HG (ref 75–129)
PO2 BLD: 84.5 MM HG (ref 75–129)
PO2 BLD: 84.6 MM HG (ref 75–129)
PO2 BLDA: 75.5 MM HG (ref 75–129)
PO2 BLDA: 76 MM HG (ref 75–129)
PO2 BLDA: 90.6 MM HG (ref 75–129)
PO2 BLDA: 99.4 MM HG (ref 75–129)
POTASSIUM SERPL-SCNC: 4.9 MMOL/L (ref 3.5–5.3)
PROCALCITONIN SERPL-MCNC: <0.05 NG/ML
PROT SERPL-MCNC: 6.5 G/DL (ref 6.4–8.2)
RBC # BLD AUTO: 3.29 MILLION/UL (ref 3.81–5.12)
S PNEUM AG UR QL: NEGATIVE
SODIUM SERPL-SCNC: 149 MMOL/L (ref 136–145)
SPECIMEN SOURCE: ABNORMAL
T4 FREE SERPL-MCNC: 1.41 NG/DL (ref 0.76–1.46)
VENT BIPAP FIO2: 40 %
VENT BIPAP FIO2: 40 %
WBC # BLD AUTO: 2.74 THOUSAND/UL (ref 4.31–10.16)

## 2018-08-19 PROCEDURE — 84100 ASSAY OF PHOSPHORUS: CPT | Performed by: PHYSICIAN ASSISTANT

## 2018-08-19 PROCEDURE — 83735 ASSAY OF MAGNESIUM: CPT | Performed by: PHYSICIAN ASSISTANT

## 2018-08-19 PROCEDURE — 94664 DEMO&/EVAL PT USE INHALER: CPT

## 2018-08-19 PROCEDURE — 82805 BLOOD GASES W/O2 SATURATION: CPT | Performed by: PHYSICIAN ASSISTANT

## 2018-08-19 PROCEDURE — 94760 N-INVAS EAR/PLS OXIMETRY 1: CPT

## 2018-08-19 PROCEDURE — 99291 CRITICAL CARE FIRST HOUR: CPT | Performed by: ANESTHESIOLOGY

## 2018-08-19 PROCEDURE — 85025 COMPLETE CBC W/AUTO DIFF WBC: CPT | Performed by: PHYSICIAN ASSISTANT

## 2018-08-19 PROCEDURE — 82948 REAGENT STRIP/BLOOD GLUCOSE: CPT

## 2018-08-19 PROCEDURE — 82805 BLOOD GASES W/O2 SATURATION: CPT | Performed by: NURSE PRACTITIONER

## 2018-08-19 PROCEDURE — 71045 X-RAY EXAM CHEST 1 VIEW: CPT

## 2018-08-19 PROCEDURE — 80053 COMPREHEN METABOLIC PANEL: CPT | Performed by: PHYSICIAN ASSISTANT

## 2018-08-19 PROCEDURE — 36600 WITHDRAWAL OF ARTERIAL BLOOD: CPT

## 2018-08-19 RX ORDER — TAMSULOSIN HYDROCHLORIDE 0.4 MG/1
0.8 CAPSULE ORAL
Status: DISCONTINUED | OUTPATIENT
Start: 2018-08-19 | End: 2018-08-28 | Stop reason: HOSPADM

## 2018-08-19 RX ORDER — MAGNESIUM SULFATE HEPTAHYDRATE 40 MG/ML
2 INJECTION, SOLUTION INTRAVENOUS ONCE
Status: COMPLETED | OUTPATIENT
Start: 2018-08-19 | End: 2018-08-19

## 2018-08-19 RX ORDER — DEXTROSE MONOHYDRATE 25 G/50ML
25 INJECTION, SOLUTION INTRAVENOUS ONCE
Status: COMPLETED | OUTPATIENT
Start: 2018-08-19 | End: 2018-08-19

## 2018-08-19 RX ORDER — NYSTATIN 100000 [USP'U]/G
1 POWDER TOPICAL 3 TIMES DAILY
Status: DISCONTINUED | OUTPATIENT
Start: 2018-08-19 | End: 2018-08-28 | Stop reason: HOSPADM

## 2018-08-19 RX ORDER — POLYVINYL ALCOHOL 14 MG/ML
1 SOLUTION/ DROPS OPHTHALMIC 2 TIMES DAILY
Status: DISCONTINUED | OUTPATIENT
Start: 2018-08-19 | End: 2018-08-19 | Stop reason: CLARIF

## 2018-08-19 RX ORDER — ALBUTEROL SULFATE 2.5 MG/3ML
2.5 SOLUTION RESPIRATORY (INHALATION) EVERY 4 HOURS PRN
Status: DISCONTINUED | OUTPATIENT
Start: 2018-08-19 | End: 2018-08-28 | Stop reason: HOSPADM

## 2018-08-19 RX ORDER — DEXTROSE MONOHYDRATE 100 MG/ML
25 INJECTION, SOLUTION INTRAVENOUS CONTINUOUS
Status: DISCONTINUED | OUTPATIENT
Start: 2018-08-19 | End: 2018-08-21

## 2018-08-19 RX ORDER — AMOXICILLIN 250 MG
2 CAPSULE ORAL DAILY
Status: DISCONTINUED | OUTPATIENT
Start: 2018-08-19 | End: 2018-08-20

## 2018-08-19 RX ORDER — DEXTROSE, SODIUM CHLORIDE, SODIUM LACTATE, POTASSIUM CHLORIDE, AND CALCIUM CHLORIDE 5; .6; .31; .03; .02 G/100ML; G/100ML; G/100ML; G/100ML; G/100ML
50 INJECTION, SOLUTION INTRAVENOUS CONTINUOUS
Status: DISCONTINUED | OUTPATIENT
Start: 2018-08-19 | End: 2018-08-19

## 2018-08-19 RX ORDER — DEXTROSE MONOHYDRATE 25 G/50ML
25 INJECTION, SOLUTION INTRAVENOUS AS NEEDED
Status: DISCONTINUED | OUTPATIENT
Start: 2018-08-19 | End: 2018-08-28 | Stop reason: HOSPADM

## 2018-08-19 RX ADMIN — DEXTROSE MONOHYDRATE 25 ML: 25 INJECTION, SOLUTION INTRAVENOUS at 17:16

## 2018-08-19 RX ADMIN — LEVOTHYROXINE SODIUM 137 MCG: 112 TABLET ORAL at 06:36

## 2018-08-19 RX ADMIN — DEXTRAN 70 AND HYPROMELLOSE 2910 1 DROP: 1; 3 SOLUTION/ DROPS OPHTHALMIC at 17:16

## 2018-08-19 RX ADMIN — MAGNESIUM SULFATE HEPTAHYDRATE 2 G: 40 INJECTION, SOLUTION INTRAVENOUS at 07:31

## 2018-08-19 RX ADMIN — DEXTROSE MONOHYDRATE 25 ML: 25 INJECTION, SOLUTION INTRAVENOUS at 19:11

## 2018-08-19 RX ADMIN — METRONIDAZOLE 500 MG: 500 INJECTION, SOLUTION INTRAVENOUS at 05:08

## 2018-08-19 RX ADMIN — NYSTATIN 1 APPLICATION: 100000 POWDER TOPICAL at 22:00

## 2018-08-19 RX ADMIN — METRONIDAZOLE 500 MG: 500 INJECTION, SOLUTION INTRAVENOUS at 21:12

## 2018-08-19 RX ADMIN — RIVAROXABAN 15 MG: 15 TABLET, FILM COATED ORAL at 08:39

## 2018-08-19 RX ADMIN — DEXTROSE MONOHYDRATE 25 ML: 25 INJECTION, SOLUTION INTRAVENOUS at 09:03

## 2018-08-19 RX ADMIN — DEXTROSE MONOHYDRATE 25 ML: 25 INJECTION, SOLUTION INTRAVENOUS at 07:30

## 2018-08-19 RX ADMIN — METRONIDAZOLE 500 MG: 500 INJECTION, SOLUTION INTRAVENOUS at 13:36

## 2018-08-19 RX ADMIN — SENNOSIDES AND DOCUSATE SODIUM 2 TABLET: 8.6; 5 TABLET ORAL at 08:39

## 2018-08-19 RX ADMIN — CEFEPIME HYDROCHLORIDE 2000 MG: 2 INJECTION, POWDER, FOR SOLUTION INTRAVENOUS at 22:02

## 2018-08-19 RX ADMIN — ALBUTEROL SULFATE 2.5 MG: 2.5 SOLUTION RESPIRATORY (INHALATION) at 07:53

## 2018-08-19 RX ADMIN — DEXTROSE MONOHYDRATE 25 ML: 25 INJECTION, SOLUTION INTRAVENOUS at 14:38

## 2018-08-19 RX ADMIN — Medication 100 MG: at 08:36

## 2018-08-19 RX ADMIN — CEFEPIME HYDROCHLORIDE 2000 MG: 2 INJECTION, POWDER, FOR SOLUTION INTRAVENOUS at 11:13

## 2018-08-19 RX ADMIN — DEXTROSE, SODIUM CHLORIDE, SODIUM LACTATE, POTASSIUM CHLORIDE, AND CALCIUM CHLORIDE 50 ML/HR: 5; .6; .31; .03; .02 INJECTION, SOLUTION INTRAVENOUS at 15:47

## 2018-08-19 RX ADMIN — DEXTRAN 70 AND HYPROMELLOSE 2910 1 DROP: 1; 3 SOLUTION/ DROPS OPHTHALMIC at 11:13

## 2018-08-19 RX ADMIN — ATORVASTATIN CALCIUM 10 MG: 10 TABLET, FILM COATED ORAL at 08:36

## 2018-08-19 RX ADMIN — SODIUM CHLORIDE 50 ML/HR: 234 INJECTION INTRAMUSCULAR; INTRAVENOUS; SUBCUTANEOUS at 19:25

## 2018-08-19 RX ADMIN — DEXTROSE 50 ML/HR: 10 SOLUTION INTRAVENOUS at 22:06

## 2018-08-19 RX ADMIN — DIVALPROEX SODIUM 250 MG: 500 TABLET, EXTENDED RELEASE ORAL at 08:37

## 2018-08-19 RX ADMIN — ARIPIPRAZOLE 30 MG: 10 TABLET ORAL at 08:37

## 2018-08-19 RX ADMIN — NYSTATIN 1 APPLICATION: 100000 POWDER TOPICAL at 15:48

## 2018-08-19 RX ADMIN — NYSTATIN 1 APPLICATION: 100000 POWDER TOPICAL at 08:36

## 2018-08-19 NOTE — H&P
History and Physical - Critical Care/ Franky Pantoja 70 y o  female MRN: [id number]  Unit/Bed#: ICU 03 Encounter: [id number]    Reason for Admission / Chief Complaint:  Cyanosis and altered mental status    History of Present Illness:  Maximino Hardin is a 70 y o  female who presents  to [facility] Emergency Department from a skilled nursing facility for appearance of cyanosis with altered mental status  She was report of some shortness of breath skilled nursing facility where she lives and reported be hypoxic though there was no oxygen saturation provided history  She began complaining of shortness of breath at approximately 15:00 and EMS was summoned approximately 19:00  Past medical history of multiple sclerosis, physical disability, seizure disorder, history of atrial fibrillation on NOAC Eliquis as an outpatient, Hx of CVA w/ chronic Mild L hemiparesis,  HTN, history of documented heart failure no echocardiogram to verify status, hypothyroidism, psychiatric disorder, mild thrombocytopenia  She is pending an ongoing workup for possible metastatic lesions to her spine and breast with Dr Idalia Mtz and was scheduled to follow up on 08/29/2018  Yeimi Medrano [facility] [place] for the last 3-4 years in assisted living  She Has a history of both manic and depressive bipolar symptoms  Her sister provides additional history that over the past 2 months the patient has had become less responsive  She reports that the patient still is able to verbalize, however, especially at night the patient slurs her speech more often and sometimes speaks unintelligibly  Her concern is suspect for possibly over medicating  In the emergency department the patient was treated with BiPAP therapy for acute pulmonary edema and hypoventilation  She was placed on Tridil for her acute pulmonary edema which was down titrated downward then off due to hypotension      She was also treated for sepsis secondary to hospital-acquired pneumonia with positive markers of leukopenia, hypothermia, thrombocytopenia, though, patient lactic acid was normal   She was treated with Levaquin and vancomycin with coverage for pneumococcus MRSA pneumonia and covered for gram-negative pneumonia with cefepime  CT of the head was performed due to persistent alteration in mental status despite correction of respiratory acidosis  CT of the head was grossly normal for acute pathology  She was transferred to ICU on BiPAP therapy 12/6 and 50%    History obtained from chart review and unobtainable from patient due to mental status  Past Medical History:  Past Medical History:   Diagnosis Date    Atrial fibrillation [facility] )     Bipolar disorder ([facility] )     Disease of thyroid gland     Heart failure ([facility] )     afibrillation    Hyperlipidemia     Hypertension     Irregular heart beat     Multiple sclerosis (HCC)     Paralysis (HCC)     left hemiplegia    Psychiatric disorder     depression, schizophrenia    Schizophrenia ([facility] )     Stroke ([facility] )     left hemiplegia       Past Surgical History:  Past Surgical History:   Procedure Laterality Date    BACK SURGERY      2    CHOLECYSTECTOMY      COLONOSCOPY N/A 1/19/2017    Procedure: COLONOSCOPY;  Surgeon: Addi Garza MD;  Location: [place] [address] GI LAB; Service:     ESOPHAGOGASTRODUODENOSCOPY N/A 1/19/2017    Procedure: ESOPHAGOGASTRODUODENOSCOPY (EGD); Surgeon: Addi Garza MD;  Location: [place] [address] GI LAB; Service:     EYE MUSCLE SURGERY Left     HYSTERECTOMY      TONSILLECTOMY         Past Family History:  History reviewed  No pertinent family history  Social History:  History   Smoking Status    Never Smoker   Smokeless Tobacco    Never Used      History   Alcohol Use No     History   Drug Use No     Marital Status:    Exercise History:  Not applicable    Medications:  Current Facility-Administered Medications   Medication Dose Route Frequency    [START ON 8/19/2018] ARIPiprazole (ABILIFY) tablet 30 mg  30 mg Oral Daily    [START ON 8/19/2018] atorvastatin (LIPITOR) tablet 10 mg  10 mg Oral Daily    cefepime (MAXIPIME) 2,000 mg in dextrose 5 % 50 mL IVPB  2,000 mg Intravenous Q12H    [START ON 8/19/2018] divalproex sodium (DEPAKOTE ER) 24 hr tablet 250 mg  250 mg Oral Daily    divalproex sodium (DEPAKOTE ER) 24 hr tablet 500 mg  500 mg Oral HS    [START ON 8/19/2018] levothyroxine tablet 137 mcg  137 mcg Oral Early Morning    metroNIDAZOLE (FLAGYL) IVPB (premix) 500 mg  500 mg Intravenous Q8H    [START ON 8/19/2018] thiamine (VITAMIN B1) tablet 100 mg  100 mg Oral Daily     Home medications:  Prior to Admission medications    Medication Sig Start Date End Date Taking?  Authorizing Provider   acetaminophen (TYLENOL) 325 mg tablet Take 650 mg by mouth every 6 (six) hours as needed for mild pain   Yes Historical Provider, MD   amiodarone 100 mg tablet Take 50 mg by mouth daily     Yes Historical Provider, MD   amLODIPine (NORVASC) 5 mg tablet Take 5 mg by mouth daily   2/22/18  Yes Historical Provider, MD   ARIPiprazole (ABILIFY) 30 mg tablet Take 30 mg by mouth daily   Yes Historical Provider, MD   atorvastatin (LIPITOR) 10 mg tablet Take 10 mg by mouth daily   Yes Historical Provider, MD   Cranberry 450 MG CAPS Take 1 capsule by mouth daily     Yes Historical Provider, MD   Dextromethorphan-Guaifenesin (ROBITUSSIN DM PO) Take 10 mL by mouth every 8 (eight) hours   Yes Historical Provider, MD   divalproex sodium (DEPAKOTE ER) 250 mg 24 hr tablet Take 250 mg by mouth daily   Yes Historical Provider, MD   divalproex sodium (DEPAKOTE ER) 500 mg 24 hr tablet Take 500 mg by mouth daily at bedtime     Yes Historical Provider, MD   docusate sodium (COLACE) 100 mg capsule Take 200 mg by mouth daily at bedtime   Yes Historical Provider, MD   Levothyroxine Sodium 137 MCG CAPS Take 137 mcg by mouth daily     Yes Historical Provider, MD   polyethylene glycol (MIRALAX) 17 g packet Take 17 g by mouth daily   Yes Historical Provider, MD   polyvinyl alcohol (LIQUIFILM TEARS) 1 4 % ophthalmic solution Administer 1 drop to both eyes 2 (two) times a day   Yes Historical Provider, MD   pregabalin (LYRICA) 50 mg capsule Take 50 mg by mouth 2 (two) times a day   Yes Historical Provider, MD   rivaroxaban (XARELTO) 15 mg tablet Take 15 mg by mouth   Yes Historical Provider, MD   senna-docusate sodium (SENOKOT S) 8 6-50 mg per tablet Take 2 tablets by mouth daily     Yes Historical Provider, MD   tamsulosin (FLOMAX) 0 4 mg Take 0 8 mg by mouth daily at bedtime   Yes Historical Provider, MD   thiamine (VITAMIN B1) 100 mg tablet Take 100 mg by mouth daily   Yes Historical Provider, MD   lisinopril (ZESTRIL) 10 mg tablet Take 10 mg by mouth daily  18  Historical Provider, MD   nitrofurantoin (MACRODANTIN) 100 mg capsule Take 100 mg by mouth daily  18  Historical Provider, MD     Allergies: Allergies   Allergen Reactions    Ciprocinonide [Fluocinolone] Itching    Darvon [Propoxyphene] Itching    Keflex [Cephalexin] Rash    Lithium Itching    Milk-Related Compounds GI Intolerance    Penicillins Itching    Sulfa Antibiotics Hives    Zithromax [Azithromycin] Rash    Ethylenediamine Tetra-Acetate [Edetic Acid] Itching       ROS:   Review of Systems   Reason unable to perform ROS: Due to patient mental status  Vitals:  Vitals:    18 2030 18 2045   BP: 131/60 90/50 98/57 102/57   BP Location: Right arm      Pulse: (!) 51 (!) 50 (!) 50 58   Resp:  (!) 8 (!) 8 (!) 10   Temp:  (!) 92 7 °F (33 7 °C)     TempSrc:  Tympanic     SpO2:  96% 96% 98%   Weight:         Temperature:   Temp (24hrs), Av 3 °F (33 5 °C), Min:92 °F (33 3 °C), Max:92 7 °F (33 7 °C)    Current Temperature: (!) 92 7 °F (33 7 °C)    Weights:   IBW: -92 5 kg  Body mass index is 41 6 kg/m²      Non-Invasive/Invasive Ventilation Settings:  Respiratory    Lab Data (Last 4 hours) None         O2/Vent Data (Last 4 hours)      08/18 1930          Non-Invasive Ventilation Mode BiPAP                 No results found for: PHART, LML5YMN, PO2ART, DOC3OPE, A4GDPWGD, BEART, SOURCE  BiPAP:  12/6:  50%     Physical Exam:  Physical Exam   Constitutional: She appears well-developed and well-nourished  HENT:   Head: Normocephalic and atraumatic  Eyes: Conjunctivae and EOM are normal  Pupils are equal, round, and reactive to light  Neck: Normal range of motion  Neck supple  No tracheal deviation present  No thyromegaly present  Cardiovascular: S1 normal and normal pulses  An irregularly irregular rhythm present  Bradycardia present  Exam reveals no gallop and no friction rub  Pulses:       Radial pulses are 2+ on the right side, and 2+ on the left side  Dorsalis pedis pulses are 2+ on the right side, and 2+ on the left side  Pulmonary/Chest: No respiratory distress  She has decreased breath sounds  She has no wheezes  She has no rales  She exhibits no tenderness  Abdominal: Soft  Bowel sounds are normal  She exhibits no distension  There is no tenderness  Musculoskeletal: Normal range of motion  Neurological: She exhibits normal muscle tone  GCS eye subscore is 3  GCS verbal subscore is 2  GCS motor subscore is 4  Patient has chronic left hemiplegia   Skin: Skin is warm and dry  Capillary refill takes less than 2 seconds  Psychiatric: Her speech is delayed and slurred  She is slowed  Patient is lethargic   Vitals reviewed        Labs:    Results from last 7 days  Lab Units 08/18/18 1915   WBC Thousand/uL 3 57*   HEMOGLOBIN g/dL 11 4*   HEMATOCRIT % 37 1   PLATELETS Thousands/uL 47*   NEUTROS PCT % 71   MONOS PCT % 8        Results from last 7 days  Lab Units 08/18/18 1915   SODIUM mmol/L 148*   POTASSIUM mmol/L 5 3   CHLORIDE mmol/L 112*   CO2 mmol/L 27   BUN mg/dL 46*   CREATININE mg/dL 1 76*   CALCIUM mg/dL 9 6   TOTAL PROTEIN g/dL 7 5   BILIRUBIN TOTAL mg/dL 0 70 ALK PHOS U/L 141*   ALT U/L 31   AST U/L 26   GLUCOSE RANDOM mg/dL 78                Results from last 7 days  Lab Units 08/18/18 1915   INR  1 14   PTT seconds 39*       Results from last 7 days  Lab Units 08/18/18 1915   LACTIC ACID mmol/L 0 9       0  Lab Value Date/Time   TROPONINI <0 02 08/18/2018 1915       Imaging: CXR: Prominent central vasculature and bilateral airspace disease likely on the basis of pulmonary edema  Small bibasilar pleural effusions  I have personally reviewed pertinent reports  and I have personally reviewed pertinent films in PACS  CT:  Head:  No acute disease, notation for history of old stroke I have personally reviewed pertinent reports  and I have personally reviewed pertinent films in PACS  EKG:  Atrial fibrillation with rate of 69 beats per minute with T-wave inversions in the lateral leads  This was personally reviewed by myself  Micro:  Blood Culture: No results found for: BLOODCX  Urine Culture:   Lab Results   Component Value Date    URINECX >100,000 cfu/ml Mixed Contaminants X6 09/28/2016    URINECX >100,000 cfu/ml Pseudomonas aeruginosa 06/23/2016     Sputum Culture: No components found for: SPUTUMCX  Wound Culure: No results found for: Anna Marie Chambers  ______________________________________________________________________    Assessment:   Active Problems:    Acute respiratory failure with hypoxia and hypercapnia (HCC)    Sepsis (HCC)    Toxic metabolic encephalopathy    Leukopenia    Thrombocytopenia (HCC)    HCAP (healthcare-associated pneumonia)    Multiple sclerosis (HCC)    Seizure disorder (HCC)    Acute pulmonary edema (HCC)    Bilateral pleural effusion    Hypothermia    Bradycardia  Resolved Problems:    * No resolved hospital problems   *      Bradycardia   Assessment & Plan    -non symptomatic  -likely secondary to hypothermia  -rewarming  -hold amiodarone for today due to bradycardia, can resume once non bradycardic        Hypothermia   Assessment & Plan -likely related to sepsis  -initiated passive rewarming measures with Greer Hugger        Bilateral pleural effusion   Assessment & Plan    -uncertain cause at this time  -questionable parapneumonic versus possibly related to processes for which patient is being worked up as an outpatient ?   Possible metastatic cancer lesions  -diuresed in the emergency department with 40 of IV Lasix  -following for response:  Given the setting of sepsis patient may require volume repletion        Acute pulmonary edema (HCC)   Assessment & Plan    -possibly heart failure related given documented history of heart failure  -status post diuresis in emergency department and nitroglycerin with improvement of hypoxia  -echocardiogram for assessment of cardiac function  -trend urine output  -BiPAP therapy        Seizure disorder ([facility] )   Assessment & Plan    -continues on Depakote for such  -trend therapeutic levels Depakote        Multiple sclerosis ([facility])   Assessment & Plan    -patient has a history of multiple sclerosis  -not actively treated at this time          HCAP (healthcare-associated pneumonia)   Assessment & Plan    -treating for pneumococcus as well as gram-negative pneumonia, also covering for anaerobic components for aspiration risk, and MRSA coverage  -likely treatment for approximately 5 days, will trend procalcitonin and taper duration and antibiotic therapy to cultures and sensitivities and procalcitonin is        Thrombocytopenia ([facility])   Assessment & Plan    -acute on chronic thrombocytopenia  -likely related to sepsis  -no active bleeding  -hold anticoagulation due to thrombocytopenia and risk for bleeding        Leukopenia   Assessment & Plan    -likely sepsis related  -absolute neutrophil count within normal limits, non neutropenic        Toxic metabolic encephalopathy   Assessment & Plan    -most likely sepsis related  -follow-up on TSH, Depakote, ammonia levels in search of causes for other source        [facility] ([facility] Assessment & Plan    -secondary to hospital-acquired pneumonia, suspect Gram-negative, covering for both pneumococcal and MRSA pneumonia  -secondary contributory component of UTI, follow up on urinary cultures        Acute respiratory failure with hypoxia and hypercapnia (HCC)   Assessment & Plan    -improved oxygen saturation and respiratory acidosis with BiPAP  -continue BiPAP 12/6/50% and titrate to SpO2 95% or greater            Family:  · Family updated: yes   Sister Baldemar Clements who is power-of- was updated at bedside  She provided her contacted information with her cell phone number as [phone] and her home phone number [phone] with requests to be updated with any acute changes and the patient's status  Her and I discussed the patient's previous wishes for her advance directives as DNR DNI  Disposition: Admit to ICU  Sister, who is also POA was contacted in the emergency department  Advanced directives were verified and patient is currently a level 3 at this time, though family is questioning whether not to reverse this  Family is en route to hospital     Counseling / Coordination of Care  Total Critical Care time spent 50 minutes excluding procedures, teaching and family updates  ______________________________________________________________________    VTE Pharmacologic Prophylaxis: Reason for no pharmacologic prophylaxis Risk for bleeding due to thrombocytopenia  VTE Mechanical Prophylaxis: sequential compression device    Invasive lines and devices:   Invasive Devices     Peripheral Intravenous Line            Peripheral IV 08/18/18 Left Antecubital less than 1 day    Peripheral IV 08/18/18 Left Forearm less than 1 day    Peripheral IV 08/18/18 Left Hand less than 1 day          Drain            Urethral Catheter Non-latex 16 Fr  less than 1 day                Code Status: Level 3 - DNAR and DNI  POA:    POLST:      Given critical illness, patient length of stay will require greater than two midnights      Signature: Mercy Mccartney PA-C  Date: 8/18/2018

## 2018-08-19 NOTE — ASSESSMENT & PLAN NOTE
· Improved today:  Acute on chronic per history  · TSH, depakote, levels wnl  · depakote and Abilify have been held w/o significant improvement  · Neuro consult to see patient tomorrow     · Consider MRI of brain

## 2018-08-19 NOTE — ASSESSMENT & PLAN NOTE
· secondary to probable hospital-acquired pneumonia, suspect Gram-negative, covering for both pneumococcal and MRSA pneumonia

## 2018-08-19 NOTE — ASSESSMENT & PLAN NOTE
· Cont to hold amiodarone due to bradycardia, can resume once non bradycardic  · Asymptomatic, less episodes have been noted

## 2018-08-19 NOTE — ED PROVIDER NOTES
History  Chief Complaint   Patient presents with    Chest Pain    Shortness of Breath     started at 3 pm today     HPI    50-year-old female with history of CHF, AFib on Xarelto, MS who presents today with shortness of breath and chest pain  Patient resides in a nursing home  Around 3:00 p m  today she expressed to the nurse that she was having shortness of breath while she was in bed  She was found cyanotic around the lips in the evening  Unsure pulse ox at that time  Patient was immediately placed on nasal cannula and brought to the emergency department  Patient had rales on exam along with significant edema in her lower extremities  Patient has complained at that time was chest pain or shortness of breath  Patient was alert and oriented  Moving all extremities  Patient does have history of stroke  Patient was immediately placed on BiPAP with settings 12/6  Patient was hypothermic with a rectal temperature of 92°  Anabel Nones was placed  I obtained an ultrasound-guided IV in the left AC  Nitroglycerin was started for pulmonary edema  Will do a septic workup  Patient's paperwork shows she is a DNR DNI will respect her wishes but will call family with her POA is her sister Rocky Bowman  Patient is a new initial VBG showed respiratory acidosis  Shortly after patient was less responsive was able to open her eyes and verbalized intermittently  Concern for possible head bleed since patient is on Xarelto unsure about recent fall  CT scan was performed which was negative for any intracranial hemorrhage  Patient's VBG was repeated again with slight improvement  Patient will require ICU admission which I discussed with Dr Jada Hoang  With the hypothermia concern for possible infectious source  Unsure if possibly of pneumonia or urinary tract infection  Will start patient on broad-spectrum antibiotics  Place patient on a Anabel Nones her    I spoke with the patient's POA who would like to follow patient's wishes of a DNR DNI  Will continue patient on BiPAP Lasix and nitro drip broad-spectrum antibiotics admit patient to ICU  Prior to Admission Medications   Prescriptions Last Dose Informant Patient Reported? Taking?    ARIPiprazole (ABILIFY) 30 mg tablet 8/18/2018 at Unknown time Other (Specify) Yes Yes   Sig: Take 30 mg by mouth daily   Cranberry 450 MG CAPS 8/18/2018 at Unknown time Outside Facility ([address] [place] [address]) Yes Yes   Sig: Take 1 capsule by mouth daily     Dextromethorphan-Guaifenesin (ROBITUSSIN DM PO) 8/18/2018 at [address] [place] [address] (Specify) Yes Yes   Sig: Take 10 mL by mouth every 8 (eight) hours   Levothyroxine Sodium 137 MCG CAPS 8/18/2018 at Unknown time Outside Facility ([address] [place] [address]) Yes Yes   Sig: Take 137 mcg by mouth daily     acetaminophen (TYLENOL) 325 mg tablet 8/18/2018 at [address] [place] [address] (Specify) Yes Yes   Sig: Take 650 mg by mouth every 6 (six) hours as needed for mild pain   amLODIPine (NORVASC) 5 mg tablet 8/18/2018 at Unknown time Outside Facility ([address] [place] [address]) Yes Yes   Sig: Take 5 mg by mouth daily     amiodarone 100 mg tablet 8/18/2018 at Unknown time Outside Facility ([address] [place] [address]) Yes Yes   Sig: Take 50 mg by mouth daily     atorvastatin (LIPITOR) 10 mg tablet 8/17/2018 at Unknown time  Yes Yes   Sig: Take 10 mg by mouth daily   divalproex sodium (DEPAKOTE ER) 250 mg 24 hr tablet 8/18/2018 at Unknown time Other (Specify) Yes Yes   Sig: Take 250 mg by mouth daily   divalproex sodium (DEPAKOTE ER) 500 mg 24 hr tablet 8/17/2018 at Unknown time Outside Facility (Specify) Yes Yes   Sig: Take 500 mg by mouth daily at bedtime     docusate sodium (COLACE) 100 mg capsule 8/17/2018 at Unknown time Other (Specify) Yes Yes   Sig: Take 200 mg by mouth daily at bedtime   polyethylene glycol (MIRALAX) 17 g packet 8/18/2018 at Unknown time Other (Specify) Yes Yes   Sig: Take 17 g by mouth daily   polyvinyl alcohol (LIQUIFILM TEARS) 1 4 % ophthalmic solution 8/18/2018 at 0900 Outside Facility (Specify) Yes Yes   Sig: Administer 1 drop to both eyes 2 (two) times a day   pregabalin (LYRICA) 50 mg capsule 8/18/2018 at 0900  Yes Yes   Sig: Take 50 mg by mouth 2 (two) times a day   rivaroxaban (XARELTO) 15 mg tablet 8/17/2018 at 2000  Yes Yes   Sig: Take 15 mg by mouth   senna-docusate sodium (SENOKOT S) 8 6-50 mg per tablet 8/18/2018 at Unknown time  Yes Yes   Sig: Take 2 tablets by mouth daily     tamsulosin (FLOMAX) 0 4 mg 8/17/2018 at Unknown time Other (Specify) Yes Yes   Sig: Take 0 8 mg by mouth daily at bedtime   thiamine (VITAMIN B1) 100 mg tablet 8/18/2018 at Unknown time  Yes Yes   Sig: Take 100 mg by mouth daily      Facility-Administered Medications: None       Past Medical History:   Diagnosis Date    Atrial fibrillation (HCC)     [facility] disorder ([facility]     Disease of thyroid gland     Heart failure ([facility] )     afibrillation    Hyperlipidemia     Hypertension     Irregular heart beat     Multiple sclerosis (HCC)     Paralysis ([facility] )     left hemiplegia    Psychiatric disorder     depression, schizophrenia    Schizophrenia ([facility]     Stroke ([facility] )     left hemiplegia       Past Surgical History:   Procedure Laterality Date    BACK SURGERY      2    CHOLECYSTECTOMY      COLONOSCOPY N/A 1/19/2017    Procedure: COLONOSCOPY;  Surgeon: Taz Ferrer MD;  Location: [facility]; Service:     ESOPHAGOGASTRODUODENOSCOPY N/A 1/19/2017    Procedure: ESOPHAGOGASTRODUODENOSCOPY (EGD); Surgeon: Taz Ferrer MD;  Location: [facility]; Service:     EYE MUSCLE SURGERY Left     HYSTERECTOMY      TONSILLECTOMY         History reviewed  No pertinent family history  I have reviewed and agree with the history as documented      Social History   Substance Use Topics    Smoking status: Never Smoker    Smokeless tobacco: Never Used    Alcohol use No        Review of Systems   Unable to perform ROS: Severe respiratory distress       Physical Exam  Physical Exam   Constitutional: She is oriented to person, place, and time  She appears well-developed  She appears distressed  Tachypneic and respiratory distress   HENT:   Head: Normocephalic and atraumatic  Mouth/Throat: Oropharynx is clear and moist    Eyes: Conjunctivae and EOM are normal  Pupils are equal, round, and reactive to light  Neck: Normal range of motion  Neck supple  Cardiovascular: Regular rhythm and normal heart sounds  No murmur heard  Sinus bradycardia   Pulmonary/Chest: She is in respiratory distress  Rales and rhonchi diffusely diminished breath sounds diffusely   Abdominal: Soft  Bowel sounds are normal  She exhibits no distension  There is no tenderness  There is no rebound and no guarding  Musculoskeletal: Normal range of motion  She exhibits edema  Significant pitting edema in the lower extremities   Neurological: She is alert and oriented to person, place, and time  Moving all extremities upper and lower with normal sensation   Skin: Skin is warm and dry  Capillary refill takes 2 to 3 seconds  There is pallor  Psychiatric: She has a normal mood and affect  Vitals reviewed        Vital Signs  ED Triage Vitals   Temperature Pulse Respirations Blood Pressure SpO2   08/18/18 1906 08/18/18 1906 08/18/18 1906 08/18/18 1921 08/18/18 1906   (!) 92 4 °F (33 6 °C) 64 (!) 24 169/74 99 %      Temp Source Heart Rate Source Patient Position - Orthostatic VS BP Location FiO2 (%)   08/18/18 1906 08/18/18 1906 08/18/18 1934 08/18/18 1934 08/18/18 1934   Tympanic Monitor Lying Right arm 50      Pain Score       08/18/18 1906       7           Vitals:    08/19/18 0800 08/19/18 0900 08/19/18 1000 08/19/18 1100   BP: 103/50 92/51 (!) 88/53 97/65   Pulse: 63 67 70 71   Patient Position - Orthostatic VS:           Visual Acuity      ED Medications  Medications   metroNIDAZOLE (FLAGYL) IVPB (premix) 500 mg (0 mg Intravenous Stopped 8/19/18 0530)   cefepime (MAXIPIME) 2,000 mg in dextrose 5 % 50 mL IVPB (2,000 mg Intravenous New Bag 8/19/18 1113) ARIPiprazole (ABILIFY) tablet 30 mg (30 mg Oral Given 8/19/18 0837)   atorvastatin (LIPITOR) tablet 10 mg (10 mg Oral Given 8/19/18 0836)   divalproex sodium (DEPAKOTE ER) 24 hr tablet 250 mg (250 mg Oral Given 8/19/18 0837)   divalproex sodium (DEPAKOTE ER) 24 hr tablet 500 mg (500 mg Oral Not Given 8/19/18 0021)   thiamine (VITAMIN B1) tablet 100 mg (100 mg Oral Given 8/19/18 0836)   levothyroxine tablet 137 mcg (137 mcg Oral Given 8/19/18 0636)   nystatin (MYCOSTATIN) powder 1 application (1 application Topical Given 8/19/18 0836)   albuterol inhalation solution 2 5 mg (2 5 mg Nebulization Given 8/19/18 0753)   rivaroxaban (XARELTO) tablet 15 mg (15 mg Oral Given 8/19/18 0839)   tamsulosin (FLOMAX) capsule 0 8 mg (not administered)   senna-docusate sodium (SENOKOT S) 8 6-50 mg per tablet 2 tablet (2 tablets Oral Given 8/19/18 0839)   dextran 70-hypromellose (GENTEAL TEARS) 0 1-0 3 % ophthalmic solution 1 drop (1 drop Both Eyes Given 8/19/18 1113)   sodium chloride 0 9 % bolus 1,000 mL (0 mL Intravenous Stopped 8/18/18 2017)   levofloxacin (LEVAQUIN) IVPB (premix) 750 mg (0 mg Intravenous Stopped 8/18/18 2124)   vancomycin (VANCOCIN) IVPB (premix) 1,000 mg (0 mg Intravenous Stopped 8/18/18 2124)   furosemide (LASIX) injection 40 mg (40 mg Intravenous Given 8/18/18 2020)   albumin human (FLEXBUMIN) 5 % injection 12 5 g (0 g Intravenous Stopped 8/18/18 2230)   magnesium sulfate 2 g/50 mL IVPB (premix) 2 g (0 g Intravenous Stopped 8/19/18 1001)   dextrose 50 % IV solution 25 mL (25 mL Intravenous Given 8/19/18 0730)   dextrose 50 % IV solution 25 mL (25 mL Intravenous Given 8/19/18 0903)       Diagnostic Studies  Results Reviewed     Procedure Component Value Units Date/Time    Blood gas, venous [[id number]]  (Abnormal) Collected:  08/18/18 2013    Lab Status:  Final result Specimen:  Blood from Arm, Right Updated:  08/18/18 2030     pH, Dov 7 298 (L)     pCO2, Dov 46 8 mm Hg      pO2, Dov 119 2 (H) mm Hg      HCO3, Dov 22 4 (L) mmol/L      Base Excess, Dov -4 0 mmol/L      O2 Content, Dov 14 4 ml/dL      O2 HGB, VENOUS 95 2 (H) %     Narrative: Therapeutic levels (1 mg/mL and 2 mg/mL) of hydroxocobalamin may interfere with the fCOHb and fMetHb where it may cause lower than expected values    BNP [[id number]]  (Abnormal) Collected:  08/18/18 1915    Lab Status:  Final result Specimen:  Blood from Arm, Left Updated:  08/18/18 2016     NT-proBNP 548 (H) pg/mL     Troponin I [[id number]]  (Normal) Collected:  08/18/18 1915    Lab Status:  Final result Specimen:  Blood from Arm, Left Updated:  08/18/18 2013     Troponin I <0 02 ng/mL     Urine Microscopic [[id number]]  (Abnormal) Collected:  08/18/18 1951    Lab Status:  Final result Specimen:  Urine from Urine, Indwelling Arriola Catheter Updated:  08/18/18 2012     RBC, UA 0-1 (A) /hpf      WBC, UA 10-20 (A) /hpf      Epithelial Cells None Seen /hpf      Bacteria, UA Occasional /hpf     Urine culture [[id number]] Collected:  08/18/18 1951    Lab Status: In process Specimen:  Urine from Urine, Indwelling Arriola Catheter Updated:  08/18/18 2012    Lactic acid, plasma [[id number]]  (Normal) Collected:  08/18/18 1915    Lab Status:  Final result Specimen:  Blood from Arm, Left Updated:  08/18/18 2011     LACTIC ACID 0 9 mmol/L     Narrative:         Result may be elevated if tourniquet was used during collection      Comprehensive metabolic panel [[id number]]  (Abnormal) Collected:  08/18/18 1915    Lab Status:  Final result Specimen:  Blood from Arm, Left Updated:  08/18/18 2008     Sodium 148 (H) mmol/L      Potassium 5 3 mmol/L      Chloride 112 (H) mmol/L      CO2 27 mmol/L      Anion Gap 9 mmol/L      BUN 46 (H) mg/dL      Creatinine 1 76 (H) mg/dL      Glucose 78 mg/dL      Calcium 9 6 mg/dL      AST 26 U/L      ALT 31 U/L      Alkaline Phosphatase 141 (H) U/L      Total Protein 7 5 g/dL      Albumin 3 7 g/dL      Total Bilirubin 0 70 mg/dL      eGFR 29 ml/min/1 73sq m     Narrative: National Kidney Disease Education Program recommendations are as follows:  GFR calculation is accurate only with a steady state creatinine  Chronic Kidney disease less than 60 ml/min/1 73 sq  meters  Kidney failure less than 15 ml/min/1 73 sq  meters      Protime-INR [[id number]]  (Abnormal) Collected:  08/18/18 1915    Lab Status:  Final result Specimen:  Blood from Arm, Left Updated:  08/18/18 2006     Protime 11 9 (H) seconds      INR 1 14    APTT [[id number]]  (Abnormal) Collected:  08/18/18 1915    Lab Status:  Final result Specimen:  Blood from Arm, Left Updated:  08/18/18 2006     PTT 39 (H) seconds     UA w Reflex to Microscopic w Reflex to Culture [[id number]]  (Abnormal) Collected:  08/18/18 1951    Lab Status:  Final result Specimen:  Urine from Urine, Indwelling Arriola Catheter Updated:  08/18/18 2003     Color, UA Yellow     Clarity, UA Clear     Specific Gravity, UA 1 010     pH, UA 6 0     Leukocytes, UA Small (A)     Nitrite, UA Negative     Protein, UA Negative mg/dl      Glucose, UA Negative mg/dl      Ketones, UA Negative mg/dl      Urobilinogen, UA 0 2 E U /dl      Bilirubin, UA Negative     Blood, UA Negative    CBC and differential [[id number]]  (Abnormal) Collected:  08/18/18 1915    Lab Status:  Final result Specimen:  Blood from Arm, Left Updated:  08/18/18 1959     WBC 3 57 (L) Thousand/uL      RBC 3 72 (L) Million/uL      Hemoglobin 11 4 (L) g/dL      Hematocrit 37 1 %       (H) fL      MCH 30 6 pg      MCHC 30 7 (L) g/dL      RDW 17 2 (H) %      MPV 12 8 (H) fL      Platelets 47 (LL) Thousands/uL      nRBC 1 /100 WBCs      Neutrophils Relative 71 %      Immat GRANS % 1 %      Lymphocytes Relative 19 %      Monocytes Relative 8 %      Eosinophils Relative 1 %      Basophils Relative 0 %      Neutrophils Absolute 2 50 Thousands/µL      Immature Grans Absolute 0 02 Thousand/uL      Lymphocytes Absolute 0 69 Thousands/µL      Monocytes Absolute 0 30 Thousand/µL      Eosinophils Absolute 0 05 Thousand/µL      Basophils Absolute 0 01 Thousands/µL     Blood gas, venous [[id number]]  (Abnormal) Collected:  08/18/18 1916    Lab Status:  Final result Specimen:  Blood from Arm, Left Updated:  08/18/18 1931     pH, Odv 7 249 (L)     pCO2, Dov 57 4 (H) mm Hg      pO2, Dov 46 2 (H) mm Hg      HCO3, Dov 24 5 mmol/L      Base Excess, Dov -3 4 mmol/L      O2 Content, Dov 12 6 ml/dL      O2 HGB, VENOUS 71 5 %     Narrative: Therapeutic levels (1 mg/mL and 2 mg/mL) of hydroxocobalamin may interfere with the fCOHb and fMetHb where it may cause lower than expected values    Blood culture #2 [[id number]] Collected:  08/18/18 1915    Lab Status: In process Specimen:  Blood from Arm, Right Updated:  08/18/18 1923    Blood culture #1 [[id number]] Collected:  08/18/18 1915    Lab Status: In process Specimen:  Blood from Arm, Left Updated:  08/18/18 1923                 CT head without contrast   Final Result by Jeferson Louise MD (08/18 2109)      No acute intracranial abnormality  Microangiopathic changes  Workstation performed: [id number]         X-ray chest 1 view portable   Final Result by Vivek Triana MD (08/18 2006)      Prominent central vasculature and bilateral airspace disease likely on the basis of pulmonary edema  Small bibasilar pleural effusions              Workstation performed: [id number]         XR chest portable ICU    (Results Pending)   XR chest portable ICU    (Results Pending)              Procedures  ECG 12 Lead Documentation  Date/Time: 8/19/2018 11:27 AM  Performed by: Nicole Garcia by: Ty Ochoa     Indications / Diagnosis:  Chest pain  ECG reviewed by me, the ED Provider: yes    Patient location:  ED  Previous ECG:     Previous ECG:  Compared to current    Similarity:  No change  Interpretation:     Interpretation: abnormal    Rate:     ECG rate assessment: bradycardic    Rhythm:     Rhythm: atrial fibrillation    QRS:     QRS axis: Normal  Conduction:     Conduction: normal    ST segments:     ST segments:  Normal  T waves:     T waves: normal    CriticalCare Time  Performed by: Alexandra Funez by: Ariana Herrera     Critical care provider statement:     Critical care time (minutes):  45    Critical care was necessary to treat or prevent imminent or life-threatening deterioration of the following conditions:  Cardiac failure, circulatory failure, dehydration, CNS failure or compromise, sepsis and respiratory failure    Critical care was time spent personally by me on the following activities:  Blood draw for specimens, obtaining history from patient or surrogate, development of treatment plan with patient or surrogate, discussions with consultants, discussions with primary provider, evaluation of patient's response to treatment, examination of patient, review of old charts, re-evaluation of patient's condition, ordering and review of radiographic studies, ordering and review of laboratory studies, ordering and performing treatments and interventions and interpretation of cardiac output measurements    I assumed direction of critical care for this patient from another provider in my specialty: yes             Phone Contacts  ED Phone Contact    ED Course  ED Course as of Aug 19 1128   Sat Aug 18, 2018   2000 Platelets: (!!) 47   2009 Patient more lethargic will recheck a VBG  Patient has 2 large IVs bilateral ACs  Currently has nitro going at 100  BiPAP on     2013 Dnr/dni      2033 RepeatVBG was performed patient slightly improved from prior but still more lethargic  Since patient is thrombocytopenic and still lethargic will scan her head to rule out any head bleed  2034 I spoke with patient's [facility] with her sister  According to Alex patient at baseline has some confusion but usually very with she is wheelchair-bound and has history of MS  She does take Xarelto for AFib    Patient has DNR DNI paperwork which sister is aware of   Sister will be coming shortly to the hospital                                MDM  Number of Diagnoses or Management Options  Acute hypercapnic respiratory failure ([facility] ):   Hypothermia:   Leukopenia:   Pulmonary edema: Thrombocytopenia ([facility] ):     CritCare Time    Disposition  Final diagnoses:   Pulmonary edema   Hypothermia   Thrombocytopenia (HCC)   Leukopenia   Acute hypercapnic respiratory failure ([facility] )     Time reflects when diagnosis was documented in both MDM as applicable and the Disposition within this note     Time User Action Codes Description Comment    8/18/2018  8:22 PM Samuel Mishra U  8  [J81 1] Pulmonary edema     8/18/2018  8:22 PM Estefany Hutchins, [address] [facility]  XXXA] Hypothermia     8/18/2018  8:22 PM Rich Danger Add [D69 6] Thrombocytopenia ([facility] )     8/18/2018  8:22 PM Rich Danger Add [D72 819] Leukopenia     8/18/2018  8:23 PM Yon Mishra Add [J96 02] Acute hypercapnic respiratory failure ([facility] )     8/18/2018 10:35 PM Connie Wolf Add [A41 9] Sepsis, due to unspecified organism [facility])       ED Disposition     ED Disposition Condition Comment    Admit  Case was discussed with critical care and the patient's admission status was agreed to be Admission Status: inpatient status to the service of Dr Pablito Rangel           Follow-up Information    None         Current Discharge Medication List      CONTINUE these medications which have NOT CHANGED    Details   acetaminophen (TYLENOL) 325 mg tablet Take 650 mg by mouth every 6 (six) hours as needed for mild pain      amiodarone 100 mg tablet Take 50 mg by mouth daily        amLODIPine (NORVASC) 5 mg tablet Take 5 mg by mouth daily        ARIPiprazole (ABILIFY) 30 mg tablet Take 30 mg by mouth daily      atorvastatin (LIPITOR) 10 mg tablet Take 10 mg by mouth daily      Cranberry 450 MG CAPS Take 1 capsule by mouth daily        Dextromethorphan-Guaifenesin (ROBITUSSIN DM PO) Take 10 mL by mouth every 8 (eight) hours      !! divalproex sodium (DEPAKOTE ER) 250 mg 24 hr tablet Take 250 mg by mouth daily      !! divalproex sodium (DEPAKOTE ER) 500 mg 24 hr tablet Take 500 mg by mouth daily at bedtime        docusate sodium (COLACE) 100 mg capsule Take 200 mg by mouth daily at bedtime      Levothyroxine Sodium 137 MCG CAPS Take 137 mcg by mouth daily        polyethylene glycol (MIRALAX) 17 g packet Take 17 g by mouth daily      polyvinyl alcohol (LIQUIFILM TEARS) 1 4 % ophthalmic solution Administer 1 drop to both eyes 2 (two) times a day      pregabalin (LYRICA) 50 mg capsule Take 50 mg by mouth 2 (two) times a day      rivaroxaban (XARELTO) 15 mg tablet Take 15 mg by mouth      senna-docusate sodium (SENOKOT S) 8 6-50 mg per tablet Take 2 tablets by mouth daily        tamsulosin (FLOMAX) 0 4 mg Take 0 8 mg by mouth daily at bedtime      thiamine (VITAMIN B1) 100 mg tablet Take 100 mg by mouth daily       ! ! - Potential duplicate medications found  Please discuss with provider  No discharge procedures on file      ED Provider  Electronically Signed by           Fatemeh Melton MD  08/19/18 [phone]

## 2018-08-19 NOTE — ASSESSMENT & PLAN NOTE
· improved oxygen saturation and respiratory acidosis with BiPAP  · off BiPAP this morning, tolerating NC  · Wean O2 as tolerated

## 2018-08-19 NOTE — ASSESSMENT & PLAN NOTE
· Unclear if related to hypothyroid or sepsis    · initiated passive rewarming measures with Greer Hugger, temp improved this morning

## 2018-08-19 NOTE — ASSESSMENT & PLAN NOTE
· possible heart failure was given 40 mg Lasix in ED and nitroglycerin with improvement of hypoxia  · Check echocardiogram  · trend urine output  · BiPAP no off, will wean O2 as tolerated

## 2018-08-19 NOTE — PROGRESS NOTES
Daily Progress Note - Critical Care/ Stepdown   Gus Chavira 70 y o  female MRN: [id number]  Unit/Bed#: ICU 03 Encounter: [id number]    ______________________________________________________________________  Assessment:   Principal Problem:    HCAP (healthcare-associated pneumonia)  Active Problems:    Acute respiratory failure with hypoxia and hypercapnia (HCC)    Sepsis ([address] )    Toxic metabolic encephalopathy    Bradycardia    Acute pulmonary edema (HCC)    Leukopenia    Hypothermia    Thrombocytopenia (HCC)    Bilateral pleural effusion    Acquired hypothyroidism    Manic depressive disorder (HCC)    Schizophrenia (HCC)    Atrial fibrillation (HCC)    Multiple sclerosis (HCC)    Seizure disorder (HCC)    Essential hypertension    Mixed hyperlipidemia  Resolved Problems:    * No resolved hospital problems   *      * HCAP (healthcare-associated pneumonia)   Assessment & Plan    · treating for pneumococcus as well as gram-negative pneumonia, also covering for anaerobic components for aspiration risk, and MRSA coverage- cont Cefepime and Flagyl  · likely treatment for approximately 5-7 days, will trend procalcitonin and taper duration and antibiotic therapy to cultures and sensitivities  · Bipap off, tolerating NC, wean O2 as tolerated  · Albuterol nebs prn        Acute respiratory failure with hypoxia and hypercapnia (HCC)   Assessment & Plan    · improved oxygen saturation and respiratory acidosis with BiPAP  · off BiPAP this morning, tolerating NC  · Wean O2 as tolerated        [address]epsis ([facility] [address] )   Assessment & Plan    · secondary to probable hospital-acquired pneumonia, suspect Gram-negative, covering for both pneumococcal and MRSA pneumonia        Toxic metabolic encephalopathy   Assessment & Plan    · most likely sepsis related, seems improved this morning  · follow-up on TSH, Depakote, ammonia levels in search of any other causes        Bradycardia   Assessment & Plan    · Asymptomatic, likely secondary to hypothermia  · Rewarming  · hold amiodarone due to bradycardia, can resume once non bradycardic        Acute pulmonary edema (HCC)   Assessment & Plan    · possible heart failure was given 40 mg Lasix in ED and nitroglycerin with improvement of hypoxia  · Check echocardiogram  · trend urine output  · BiPAP no off, will wean O2 as tolerated        Hypothermia   Assessment & Plan    · Unclear if related to hypothyroid or sepsis  · initiated passive rewarming measures with Greer Hugger, temp improved this morning        Leukopenia   Assessment & Plan    · likely sepsis related  · Cont antibx  · F/u cultures        Thrombocytopenia (HCC)   Assessment & Plan    · acute on chronic thrombocytopenia  · likely related to sepsis  · no active bleeding        Bilateral pleural effusion   Assessment & Plan    · uncertain cause at this time  · questionable parapneumonic versus possibly related to processes for which patient is being worked up as an outpatient ?   Possible metastatic cancer lesions  · diuresed in the emergency department with 40 mg IV Lasix  · CXR improved overall but still with B/L Effusions        Acquired hypothyroidism   Assessment & Plan    · Cont Levothyroxine  · TSH elevated this morning, Free T4 pending        Manic depressive disorder (HCC)   Assessment & Plan    · Cont Depakote and Abilify        Atrial fibrillation (HCC)   Assessment & Plan    · Cont Xarelto  · Controlled rate        Multiple sclerosis (HCC)   Assessment & Plan    · patient has a history of multiple sclerosis  · not actively treated at this time  · Supportive care          Mixed hyperlipidemia   Assessment & Plan    · Restart home statin        Essential hypertension   Assessment & Plan    · Hold Norvasc for now, restart when BP's increased        Seizure disorder ([facility] [address] )   Assessment & Plan    · continues on Depakote  · Check Depakote level            Plan:    Neuro:   · Regulate sleep/wake cycle  · Cont home psych meds    CV:   · Rhythm: Sinus Bradycardia  · Follow rhythm on telemetry    Pulm:   · Tolerating NC, wean as tolerated    GI:   · Nutrition/diet plan: Regular  · Stress ulcer prophylaxis: No prophylaxis needed  · Bowel regimen: Pericolace  · Last BM: prior to admission    FEN:   · Lytes stable, no intervention  · Electrolytes repleted: not applicable  · Goal: K >3 0, Mag >2 0, and Phos >3 0    :   · Indwelling Arriola present: Yes   · Urinary catheter still needed for Strict I and O in a critically ill patient  ID:   · Cont antibx, f/u cultures  · Abx ordered: Cefepime and Flagyl  · Day # 1 of 7 day course  · Trend temps and WBC count    Heme:   · Hgb stable  · Trend hgb and plts as needed    Endo:   · Hypoglycemic this morning will give 1/2 amp D50 and trend BS    Msk/Skin:  · Mobility goal: OOB as tolerated  · PT consult: yes  · OT consult: yes  · Frequent turning and off-loading    Family:  · Family updated within 24 hours: yes   · Family meeting planned today: no     Lines:  · PIV    VTE Prophylaxis:  · Pharmacologic Prophylaxis: Xarelto  · Mechanical Prophylaxis: sequential compression device    Disposition: Continue Stepdown    Code Status: Level 3 - DNAR and DNI    Counseling / Coordination of Care  Total time spent today 40 minutes  Greater than 50% of total time was spent with the patient and / or family counseling and / or coordination of care   A description of the counseling / coordination of care: evaluating patient, reviewing chart, writing note, placing orders  ______________________________________________________________________    HPI/24hr events: admitted overnight, off Bipap this morning, transitioned to NC and tolerating; states she is feeling ok and denies any SOB this morning; speech is thick and somewhat slurred; spoke to her sister and she confirms that is baseline for her of recent    ______________________________________________________________________    Physical Exam:   Physical Exam   Constitutional: She is oriented to person, place, and time  She appears well-developed and well-nourished  No distress  HENT:   Head: Normocephalic  Eyes: Pupils are equal, round, and reactive to light  Neck: Normal range of motion  Cardiovascular: Regular rhythm  Bradycardia present  Pulmonary/Chest: Effort normal and breath sounds normal    Abdominal: Soft  Bowel sounds are normal  She exhibits no distension  There is no tenderness  Musculoskeletal: Normal range of motion  Neurological: She is alert and oriented to person, place, and time  Skin: Skin is warm and dry  Capillary refill takes less than 2 seconds  She is not diaphoretic  Psychiatric: She has a normal mood and affect  Her behavior is normal  Her speech is slurred (reported to be baseline per sister)  Nursing note and vitals reviewed  ______________________________________________________________________  Vitals:    18 0900 18 1000 18 1100 18 1200   BP: 92/51 (!) 88/53 97/65 91/55   BP Location:       Pulse: 67 70 71 65   Resp: 16 17 15 16   Temp:    98 4 °F (36 9 °C)   TempSrc:    Tympanic   SpO2: 97% 96% 95% 95%   Weight:       Height:                  Temperature:   Temp (24hrs), Av 4 °F (34 7 °C), Min:92 °F (33 3 °C), Max:98 4 °F (36 9 °C)    Current Temperature: 98 4 °F (36 9 °C)    Weights:   IBW: 57 kg    Body mass index is 43 22 kg/m²    Weight (last 2 days)     Date/Time   Weight    18 2236  118 (259 7)    18 1906  113 (250)              Hemodynamic Monitoring:  N/A       Non-Invasive/Invasive Ventilation Settings:  Respiratory    Lab Data (Last 4 hours)    None         O2/Vent Data (Last 4 hours)    None              Lab Results   Component Value Date    PHART 7 357 2018    LXF4PHA 43 5 2018    PO2ART 90 6 2018    GNZ7LHR 23 9 2018    BEART -1 6 2018    SOURCE Brachial, Right 2018     SpO2: SpO2: 96 %, SpO2 Device: O2 Device: Other (comment) (neb tx in progress )    Intake and Outputs:  I/O       08/17 0701 - 08/18 0700 08/18 0701 - 08/19 0700 08/19 0701 - 08/20 0700    P  O    200    IV Piggyback  1850 100    Total Intake(mL/kg)  1850 (15 7) 300 (2 5)    Urine (mL/kg/hr)  1350 380 (0 6)    Total Output   1350 380    Net   +500 -80                 Nutrition:        Diet Orders            Start     Ordered    08/19/18 1155  Diet NPO; Sips with meds  Diet effective now     Question Answer Comment   Diet Type NPO    NPO Except: Sips with meds    RD to adjust diet per protocol? No        08/19/18 1155          Labs:     Results from last 7 days  Lab Units 08/19/18  0503 08/18/18 1915   WBC Thousand/uL 2 74* 3 57*   HEMOGLOBIN g/dL 10 1* 11 4*   HEMATOCRIT % 32 6* 37 1   PLATELETS Thousands/uL 52* 47*   NEUTROS PCT % 65 71   MONOS PCT % 12 8       Results from last 7 days  Lab Units 08/19/18  0503 08/18/18 1915   SODIUM mmol/L 149* 148*   POTASSIUM mmol/L 4 9 5 3   CHLORIDE mmol/L 114* 112*   CO2 mmol/L 25 27   BUN mg/dL 46* 46*   CREATININE mg/dL 1 68* 1 76*   CALCIUM mg/dL 8 8 9 6   TOTAL PROTEIN g/dL 6 5 7 5   BILIRUBIN TOTAL mg/dL 0 60 0 70   ALK PHOS U/L 111 141*   ALT U/L 27 31   AST U/L 21 26   GLUCOSE RANDOM mg/dL 49* 78       Results from last 7 days  Lab Units 08/19/18  0503   MAGNESIUM mg/dL 1 8     Lab Results   Component Value Date    PHOS 3 8 08/19/2018        Results from last 7 days  Lab Units 08/18/18 1915   INR  1 14   PTT seconds 39*       0  Lab Value Date/Time   TROPONINI <0 02 08/18/2018 1915       Results from last 7 days  Lab Units 08/18/18 1915   LACTIC ACID mmol/L 0 9     ABG:  Lab Results   Component Value Date    PHART 7 357 08/19/2018    [id number] 43 5 08/19/2018    PO2ART 90 6 08/19/2018    LFW2YJM 23 9 08/19/2018    BEART -1 6 08/19/2018    SOURCE Brachial, Right 08/19/2018       Imaging: CXR: Prominent central vasculature and bilateral airspace disease likely on the basis of pulmonary edema    Small bibasilar pleural effusions I have personally reviewed pertinent reports  and I have personally reviewed pertinent films in PACS    EKG: Sinus bradycardia    Micro:  Lab Results   Component Value Date    URINECX >100,000 cfu/ml Mixed Contaminants X6 09/28/2016    URINECX >100,000 cfu/ml Pseudomonas aeruginosa 06/23/2016       Allergies: Allergies   Allergen Reactions    Ciprocinonide [Fluocinolone] Itching    Darvon [Propoxyphene] Itching    Keflex [Cephalexin] Rash    Lithium Itching    Penicillins Itching    Sulfa Antibiotics Hives    Zithromax [Azithromycin] Rash    Ethylenediamine Tetra-Acetate [Edetic Acid] Itching     Medications:   Scheduled Meds:  Current Facility-Administered Medications:  albuterol 2 5 mg Nebulization Q4H PRN Edna Elio, CRNP    ARIPiprazole 30 mg Oral Daily [facility]    atorvastatin 10 mg Oral Daily [facility]    cefepime 2,000 mg Intravenous Q12H [facility] Last Rate: Stopped (08/19/18 1201)   dextran 70-hypromellose 1 drop Both Eyes BID Edna Berlin, CRNP    divalproex sodium 250 mg Oral Daily [facility]    divalproex sodium 500 mg Oral HS Shon Loera PA-LALITO    levothyroxine 137 mcg Oral Early Morning [facility]    metroNIDAZOLE 500 mg Intravenous Q8H Shon Loera PA-LALITO Last Rate: Stopped (08/19/18 0530)   nystatin 1 application Topical TID Shon Loera PA-C    rivaroxaban 15 mg Oral Daily With Breakfast Edna Berlin, CRNP    senna-docusate sodium 2 tablet Oral Daily Edna Berlin, CRNP    tamsulosin 0 8 mg Oral HS Edna Elio, CRNP    thiamine 100 mg Oral Daily Shon Loera PA-C      Continuous Infusions:   PRN Meds:    albuterol 2 5 mg Q4H PRN       Invasive lines and devices:   Invasive Devices     Peripheral Intravenous Line            Peripheral IV 08/18/18 Left Antecubital less than 1 day    Peripheral IV 08/18/18 Left Forearm less than 1 day    Peripheral IV 08/18/18 Left Hand less than 1 day          Drain            Urethral Catheter Non-latex 16 Fr  less than 1 day                   SIGNATURE: HAI Lemus  DATE: August 19, 2018

## 2018-08-19 NOTE — PROGRESS NOTES
Progress Notes:    Pt had refractory respiratory acidosis into the early morning w/ ABG of 7 28/50 / Increased Vt on bipap to 300-400 ml via adjustments from 12/6 to 14/5  Pt w/ resolved resp acidosis now of 7 37/41  Pt now much more alert  Pulled off mask stating she was uncomfortable      CC time 30 min

## 2018-08-19 NOTE — ASSESSMENT & PLAN NOTE
· acute on chronic thrombocytopenia > chronic component ? Related to possible malignancy  Follows Dr Adelaide Toro outpatient  · no active bleeding  · Trend platelets for improvement currently 58, seems to run in 100's baseline

## 2018-08-19 NOTE — ASSESSMENT & PLAN NOTE
· Unclear history of possible Seizure disorder with history of "convulsions" in pt chart  · Subtherapeutic depakote levels  · Pt meds have been held due to poor mentation and ability to swallow

## 2018-08-19 NOTE — ASSESSMENT & PLAN NOTE
· patient has a history of multiple sclerosis  · not actively treated at this time  · Supportive care

## 2018-08-19 NOTE — ASSESSMENT & PLAN NOTE
· uncertain cause at this time  · questionable parapneumonic versus possibly related to processes for which patient is being worked up as an outpatient ? Possible metastatic cancer lesions  · diuresed with 40 mg IV Lasix again yesterday with good response  · Will continue diuresis today

## 2018-08-20 ENCOUNTER — APPOINTMENT (INPATIENT)
Dept: RADIOLOGY | Facility: HOSPITAL | Age: 68
DRG: 871 | End: 2018-08-20
Payer: MEDICARE

## 2018-08-20 ENCOUNTER — APPOINTMENT (INPATIENT)
Dept: NON INVASIVE DIAGNOSTICS | Facility: HOSPITAL | Age: 68
DRG: 871 | End: 2018-08-20
Payer: MEDICARE

## 2018-08-20 PROBLEM — J96.01 ACUTE RESPIRATORY FAILURE WITH HYPOXIA AND HYPERCAPNIA (HCC): Status: RESOLVED | Noted: 2018-08-18 | Resolved: 2018-08-20

## 2018-08-20 PROBLEM — J81.0 ACUTE PULMONARY EDEMA (HCC): Status: RESOLVED | Noted: 2018-08-18 | Resolved: 2018-08-20

## 2018-08-20 PROBLEM — E16.2 HYPOGLYCEMIA: Status: ACTIVE | Noted: 2018-08-20

## 2018-08-20 PROBLEM — J96.02 ACUTE RESPIRATORY FAILURE WITH HYPOXIA AND HYPERCAPNIA (HCC): Status: RESOLVED | Noted: 2018-08-18 | Resolved: 2018-08-20

## 2018-08-20 LAB
ANION GAP SERPL CALCULATED.3IONS-SCNC: 7 MMOL/L (ref 4–13)
BASOPHILS # BLD AUTO: 0.01 THOUSANDS/ΜL (ref 0–0.1)
BASOPHILS NFR BLD AUTO: 0 % (ref 0–1)
BUN SERPL-MCNC: 41 MG/DL (ref 5–25)
CALCIUM SERPL-MCNC: 8.8 MG/DL (ref 8.3–10.1)
CHLORIDE SERPL-SCNC: 114 MMOL/L (ref 100–108)
CO2 SERPL-SCNC: 26 MMOL/L (ref 21–32)
CREAT SERPL-MCNC: 1.96 MG/DL (ref 0.6–1.3)
EOSINOPHIL # BLD AUTO: 0.03 THOUSAND/ΜL (ref 0–0.61)
EOSINOPHIL NFR BLD AUTO: 1 % (ref 0–6)
ERYTHROCYTE [DISTWIDTH] IN BLOOD BY AUTOMATED COUNT: 17.2 % (ref 11.6–15.1)
GFR SERPL CREATININE-BSD FRML MDRD: 26 ML/MIN/1.73SQ M
GLUCOSE SERPL-MCNC: 107 MG/DL (ref 65–140)
GLUCOSE SERPL-MCNC: 74 MG/DL (ref 65–140)
GLUCOSE SERPL-MCNC: 76 MG/DL (ref 65–140)
GLUCOSE SERPL-MCNC: 80 MG/DL (ref 65–140)
GLUCOSE SERPL-MCNC: 83 MG/DL (ref 65–140)
GLUCOSE SERPL-MCNC: 87 MG/DL (ref 65–140)
GLUCOSE SERPL-MCNC: 89 MG/DL (ref 65–140)
GLUCOSE SERPL-MCNC: 90 MG/DL (ref 65–140)
GLUCOSE SERPL-MCNC: 91 MG/DL (ref 65–140)
GLUCOSE SERPL-MCNC: 93 MG/DL (ref 65–140)
GLUCOSE SERPL-MCNC: 96 MG/DL (ref 65–140)
GLUCOSE SERPL-MCNC: 97 MG/DL (ref 65–140)
HCT VFR BLD AUTO: 35.1 % (ref 34.8–46.1)
HGB BLD-MCNC: 10.6 G/DL (ref 11.5–15.4)
IMM GRANULOCYTES # BLD AUTO: 0.02 THOUSAND/UL (ref 0–0.2)
IMM GRANULOCYTES NFR BLD AUTO: 1 % (ref 0–2)
LYMPHOCYTES # BLD AUTO: 0.92 THOUSANDS/ΜL (ref 0.6–4.47)
LYMPHOCYTES NFR BLD AUTO: 26 % (ref 14–44)
MAGNESIUM SERPL-MCNC: 2.2 MG/DL (ref 1.6–2.6)
MCH RBC QN AUTO: 30.5 PG (ref 26.8–34.3)
MCHC RBC AUTO-ENTMCNC: 30.2 G/DL (ref 31.4–37.4)
MCV RBC AUTO: 101 FL (ref 82–98)
MONOCYTES # BLD AUTO: 0.64 THOUSAND/ΜL (ref 0.17–1.22)
MONOCYTES NFR BLD AUTO: 18 % (ref 4–12)
MRSA NOSE QL CULT: NORMAL
NEUTROPHILS # BLD AUTO: 1.96 THOUSANDS/ΜL (ref 1.85–7.62)
NEUTS SEG NFR BLD AUTO: 54 % (ref 43–75)
NRBC BLD AUTO-RTO: 1 /100 WBCS
PHOSPHATE SERPL-MCNC: 3.7 MG/DL (ref 2.3–4.1)
PLATELET # BLD AUTO: 52 THOUSANDS/UL (ref 149–390)
PMV BLD AUTO: 13.5 FL (ref 8.9–12.7)
POTASSIUM SERPL-SCNC: 4.7 MMOL/L (ref 3.5–5.3)
PROCALCITONIN SERPL-MCNC: <0.05 NG/ML
RBC # BLD AUTO: 3.47 MILLION/UL (ref 3.81–5.12)
SODIUM SERPL-SCNC: 147 MMOL/L (ref 136–145)
WBC # BLD AUTO: 3.58 THOUSAND/UL (ref 4.31–10.16)

## 2018-08-20 PROCEDURE — 83735 ASSAY OF MAGNESIUM: CPT | Performed by: NURSE PRACTITIONER

## 2018-08-20 PROCEDURE — 82948 REAGENT STRIP/BLOOD GLUCOSE: CPT

## 2018-08-20 PROCEDURE — 85025 COMPLETE CBC W/AUTO DIFF WBC: CPT | Performed by: NURSE PRACTITIONER

## 2018-08-20 PROCEDURE — 71250 CT THORAX DX C-: CPT

## 2018-08-20 PROCEDURE — 84100 ASSAY OF PHOSPHORUS: CPT | Performed by: NURSE PRACTITIONER

## 2018-08-20 PROCEDURE — 94760 N-INVAS EAR/PLS OXIMETRY 1: CPT

## 2018-08-20 PROCEDURE — G8996 SWALLOW CURRENT STATUS: HCPCS

## 2018-08-20 PROCEDURE — 84145 PROCALCITONIN (PCT): CPT | Performed by: NURSE PRACTITIONER

## 2018-08-20 PROCEDURE — 74176 CT ABD & PELVIS W/O CONTRAST: CPT

## 2018-08-20 PROCEDURE — 71045 X-RAY EXAM CHEST 1 VIEW: CPT

## 2018-08-20 PROCEDURE — G8987 SELF CARE CURRENT STATUS: HCPCS

## 2018-08-20 PROCEDURE — G8979 MOBILITY GOAL STATUS: HCPCS

## 2018-08-20 PROCEDURE — 93306 TTE W/DOPPLER COMPLETE: CPT

## 2018-08-20 PROCEDURE — 97163 PT EVAL HIGH COMPLEX 45 MIN: CPT

## 2018-08-20 PROCEDURE — 92610 EVALUATE SWALLOWING FUNCTION: CPT

## 2018-08-20 PROCEDURE — 97167 OT EVAL HIGH COMPLEX 60 MIN: CPT

## 2018-08-20 PROCEDURE — G8988 SELF CARE GOAL STATUS: HCPCS

## 2018-08-20 PROCEDURE — G8997 SWALLOW GOAL STATUS: HCPCS

## 2018-08-20 PROCEDURE — 80048 BASIC METABOLIC PNL TOTAL CA: CPT | Performed by: NURSE PRACTITIONER

## 2018-08-20 PROCEDURE — G8978 MOBILITY CURRENT STATUS: HCPCS

## 2018-08-20 PROCEDURE — 99291 CRITICAL CARE FIRST HOUR: CPT | Performed by: INTERNAL MEDICINE

## 2018-08-20 PROCEDURE — 94669 MECHANICAL CHEST WALL OSCILL: CPT

## 2018-08-20 RX ORDER — AMOXICILLIN 250 MG
1 CAPSULE ORAL 2 TIMES DAILY
Status: DISCONTINUED | OUTPATIENT
Start: 2018-08-20 | End: 2018-08-28 | Stop reason: HOSPADM

## 2018-08-20 RX ORDER — FUROSEMIDE 10 MG/ML
40 INJECTION INTRAMUSCULAR; INTRAVENOUS ONCE
Status: COMPLETED | OUTPATIENT
Start: 2018-08-20 | End: 2018-08-20

## 2018-08-20 RX ADMIN — METRONIDAZOLE 500 MG: 500 INJECTION, SOLUTION INTRAVENOUS at 05:19

## 2018-08-20 RX ADMIN — NYSTATIN 1 APPLICATION: 100000 POWDER TOPICAL at 08:05

## 2018-08-20 RX ADMIN — METRONIDAZOLE 500 MG: 500 INJECTION, SOLUTION INTRAVENOUS at 13:59

## 2018-08-20 RX ADMIN — SENNOSIDES AND DOCUSATE SODIUM 1 TABLET: 8.6; 5 TABLET ORAL at 20:56

## 2018-08-20 RX ADMIN — Medication 100 MG: at 08:04

## 2018-08-20 RX ADMIN — LEVOTHYROXINE SODIUM 137 MCG: 112 TABLET ORAL at 05:19

## 2018-08-20 RX ADMIN — SENNOSIDES AND DOCUSATE SODIUM 2 TABLET: 8.6; 5 TABLET ORAL at 08:04

## 2018-08-20 RX ADMIN — DEXTRAN 70 AND HYPROMELLOSE 2910 1 DROP: 1; 3 SOLUTION/ DROPS OPHTHALMIC at 08:05

## 2018-08-20 RX ADMIN — TAMSULOSIN HYDROCHLORIDE 0.8 MG: 0.4 CAPSULE ORAL at 21:00

## 2018-08-20 RX ADMIN — DIVALPROEX SODIUM 250 MG: 500 TABLET, EXTENDED RELEASE ORAL at 08:06

## 2018-08-20 RX ADMIN — CEFEPIME HYDROCHLORIDE 2000 MG: 2 INJECTION, POWDER, FOR SOLUTION INTRAVENOUS at 10:00

## 2018-08-20 RX ADMIN — NYSTATIN 1 APPLICATION: 100000 POWDER TOPICAL at 16:00

## 2018-08-20 RX ADMIN — ATORVASTATIN CALCIUM 10 MG: 10 TABLET, FILM COATED ORAL at 08:04

## 2018-08-20 RX ADMIN — DEXTRAN 70 AND HYPROMELLOSE 2910 1 DROP: 1; 3 SOLUTION/ DROPS OPHTHALMIC at 17:58

## 2018-08-20 RX ADMIN — RIVAROXABAN 15 MG: 15 TABLET, FILM COATED ORAL at 08:04

## 2018-08-20 RX ADMIN — FUROSEMIDE 40 MG: 10 INJECTION, SOLUTION INTRAMUSCULAR; INTRAVENOUS at 10:39

## 2018-08-20 RX ADMIN — DIVALPROEX SODIUM 500 MG: 500 TABLET, EXTENDED RELEASE ORAL at 21:00

## 2018-08-20 RX ADMIN — NYSTATIN 1 APPLICATION: 100000 POWDER TOPICAL at 21:01

## 2018-08-20 NOTE — MALNUTRITION/BMI
This medical record reflects one or more clinical indicators suggestive of morbid obesity  BMI Findings:  BMI Classifications: Morbid Obesity 40-44 9     Body mass index is 43 22 kg/m²  See Nutrition note dated 8/20/18 for additional details  Completed nutrition assessment is viewable in the nutrition documentation

## 2018-08-20 NOTE — PLAN OF CARE
Problem: SLP ADULT - SWALLOWING, IMPAIRED  Goal: Initial SLP swallow eval performed  Outcome: Completed Date Met: 08/20/18

## 2018-08-20 NOTE — DISCHARGE INSTR - DIET
Dysphagia 1/Pureed diet and Medications crushed    Thin liquids  Feeding assistance  Go slow, small bites/sips, feed when alert, alternate food and drink

## 2018-08-20 NOTE — OCCUPATIONAL THERAPY NOTE
OT EVALUATION     08/20/18 1151   Note Type   Note type Eval only   Restrictions/Precautions   Other Precautions Bed Alarm; Fall Risk;Cognitive;Multiple lines  (lethargic)   Pain Assessment   Pain Assessment No/denies pain   Home Living   Type of Home SNF  ([facility])   Additional Comments pt reports using a sit to stand to the wheelchair,  Patient feeds self with setup normally   Prior Function   Level of [place] Needs assistance with ADLs and functional mobility; Needs assistance with IADLs   Lives With Facility staff   Receives Help From Personal care attendant   ADL Assistance Needs assistance   IADLs Needs assistance   ADL   Eating Assistance 2  Maximal Assistance   Grooming Assistance 1  Total Assistance   UB Bathing Assistance 1  Total Assistance   LB Bathing Assistance 1  Total Assistance   [address] 1  Total Assistance   LB Dressing Assistance 1  Total Assistance   Toileting Assistance  1  Total Assistance   Bed Mobility   Rolling R 1  Dependent   Rolling L 1  Dependent   Transfers   Sit to Stand Unable to assess   Activity Tolerance   Activity Tolerance Treatment limited secondary to medical complications (Comment); Patient limited by fatigue  (lethargy)   RUE Assessment   RUE Assessment (elbow and  PROM WFL,  MMT 2+/5)   RUE Overall AROM   R Shoulder Flexion able to hold shoulder at 90 degrees, AAROM WFL, MMT 3-/5   Edema   RUE Edema +1   LUE Assessment   LUE Assessment (AAROM WFL, elbow 3-/5,  3/5)   LUE Overall AROM   L Shoulder Flexion able to hold shoulder at 90 degrees, AAROM WFL, MMT 3-/5   Edema   LUE Edema +1   Cognition   Overall Cognitive Status Impaired   Orientation Level Oriented to person;Oriented to place   Following Commands Follows one step commands with increased time or repetition   Comments pt is lethargic, falling asleep during session, pt needs suction x 2 throughout session    Assessment   Limitation Decreased ADL status; Decreased UE strength;Decreased Safe judgement during ADL;Decreased endurance;Decreased high-level ADLs; Decreased self-care trans  (decreased balance )   Prognosis Fair   Assessment Patient evaluated by Occupational Therapy  Patient admitted with HCAP (healthcare-associated pneumonia)  The patients occupational profile, medical and therapy history includes a extensive additional review of physical, cognitive, or psychosocial history related to current functional performance  Comorbidities affecting functional mobility and ADLS include: MS, schizophrenia, Bipolar, CVA, depression and hypertension  Prior to admission, patient was dependent for mobility, requiring assist for ADLS, requiring assist for IADLS and a resident of long term care  The evaluation identifies the following performance deficits: weakness, decreased ROM, impaired balance, decreased endurance, increased fall risk, new onset of impairment of functional mobility, decreased ADLS, decreased IADLS, decreased activity tolerance, decreased safety awareness, impaired judgement, decreased cognition and decreased strength, that result in activity limitations and/or participation restrictions  This evaluation requires clinical decision making of high complexity, because the patient presents with comorbidites that affect occupational performance and required significant modification of tasks or assistance with consideration of multiple treatment options  The Barthel Index was used as a functional outcome tool presenting with a score of 0, indicating marked limitations of functional mobility and ADLS  Patient will benefit from skilled Occupational Therapy services to address above deficits and facilitate a safe return to prior level of function  Goals   Patient Goals pt unable to state due to lethargy   STG Time Frame (1-7 days)   Short Term Goal  Patient will increase bed mobility to mod assist of 2 in preparation for ADLS and transfers;   Patient will tolerate 10 minutes of UE ROM/strengthening to increase general activity tolerance and performance in ADLS/IADLS; Patient will improve functional activity tolerance to 10 minutes of sustained functional tasks to increase participation in basic self-care and decrease assistance level;  Patient will increase dynamic sitting balance to poor+ to improve the ability to sit at edge of bed or on a chair for ADLS  LTG Time Frame (8-14 days)   Long Term Goal Patient will increase bed mobility to mod assist in preparation for ADLS and transfers; Patient will tolerate 20 minutes of UE ROM/strengthening to increase general activity tolerance and performance in ADLS/IADLS; Patient will improve functional activity tolerance to 20 minutes of sustained functional tasks to increase participation in basic self-care and decrease assistance level;  Patient will increase dynamic sitting balance to fair- to improve the ability to sit at edge of bed or on a chair for ADLS  ADL Goals   Pt Will Perform Eating (STG mod assist LTG Min asssit )   Pt Will Perform Grooming (STG mod assist LTG Min asssit )   Pt Will Perform Bathing (STG max assist LTG mod assist )   Pt Will Perform UE Dressing (STG max assist LTG mod assist )   Pt Will Perform LE Dressing (STG max assist LTG mod assist )   Pt Will Perform Toileting (STG max assist LTG mod assist )   Plan   Treatment Interventions ADL retraining;Functional transfer training;UE strengthening/ROM; Endurance training;Patient/family training;Equipment evaluation/education;Cognitive reorientation; Activityengagement; Energy conservation   Goal Expiration Date 09/03/18   Treatment Day 0   OT Frequency 2-3x/wk   Recommendation   OT Discharge Recommendation (return to SNF with PT/OT)   Barthel Index   Feeding 0   Bathing 0   Grooming Score 0   Dressing Score 0   Bladder Score 0   Bowels Score 0   Toilet Use Score 0   Transfers (Bed/Chair) Score 0   Mobility (Level Surface) Score 0   Stairs Score 0   Barthel Index Score 0 Licensure   [place] License Number  Arsenio Ontiveros [address] 86 OTR/L [id number]

## 2018-08-20 NOTE — PHYSICAL THERAPY NOTE
PT EVALUATION       08/20/18 1148   Note Type   Note type Eval only   Pain Assessment   Pain Assessment No/denies pain   Home Living   Type of Home SNF  ([facility])   Prior Function   Level of [place] Needs assistance with ADLs and functional mobility; Needs assistance with IADLs   Lives With Facility staff   Receives Help From (Facility staff)   ADL Assistance Needs assistance   IADLs Needs assistance   Comments Pt uses a sit to stand for all transfers  Restrictions/Precautions   Other Precautions Fall Risk;Bed Alarm; Chair Alarm   General   Additional Pertinent History Pt adm with cyanosis, AMS, (+) SOB and hypoxic  Family/Caregiver Present No   Cognition   Overall Cognitive Status Impaired   Arousal/Participation Lethargic   Orientation Level Oriented to person;Oriented to place;Oriented to situation;Disoriented to time   Following Commands Follows one step commands with increased time or repetition   RLE Assessment   RLE Assessment WFL  (Prom to slight AAROM;LEs rigid)   LLE Assessment   LLE Assessment WFL  (Prom to slight AAROM;LEs rigid)   Activity Tolerance   Activity Tolerance Patient limited by fatigue;Treatment limited secondary to medical complications (Comment)  (pt having difficulty staying awake)   Assessment   Prognosis Good   Problem List Decreased strength;Decreased range of motion;Decreased endurance; Impaired balance;Decreased mobility; Decreased cognition; Impaired judgement;Decreased safety awareness   Assessment Patient seen for Physical Therapy evaluation  Patient admitted with HCAP (healthcare-associated pneumonia)  Comorbidities affecting patient's physical performance include: Anemia, sepsis, toxic metabolic encephalopathy, leukopenia, MS, seizure disorder, bilateral pleural effusion, bradycardia, A-Fib, bone lesion, breast neoplasm, cerebral infarction, cervical spinal stenosis, HTN, lumbosacral spinal stenosis, manic depressive disorder, HLD, schizophrenia, hypoglycemia     Personal factors affecting patient at time of initial evaluation include: inability to ambulate household distances, inability to navigate community distances and inability to live alone  Prior to admission, patient was dependent for mobility, requiring assist for ADLS, requiring assist for IADLS and a resident of long term care  Please find objective findings from Physical Therapy assessment regarding body systems outlined above with impairments and limitations including weakness, decreased ROM, impaired balance, decreased endurance, impaired coordination, gait deviations, decreased activity tolerance, decreased functional mobility tolerance, impaired judgement, fall risk and decreased cognition  The Barthel Index was used as a functional outcome tool presenting with a score of 5 today indicating marked limitations of functional mobility and ADLS  Patient's clinical presentation is currently unstable/unpredictable as seen in patient's presentation of vital sign response, varying levels of cognitive performance, increased fall risk, new onset of impairment of functional mobility, decreased endurance and new onset of weakness  Pt would benefit from continued Physical Therapy treatment to address deficits as defined above and maximize level of functional mobility  As demonstrated by objective findings, the assigned level of complexity for this evaluation is high     Goals   Patient Goals Pt did not state due to lethargy   STG Expiration Date 08/27/18   Short Term Goal #1 bed mob - max/mod A + 2; balance EOB - pt will maintain with mod A + 1   Short Term Goal #2 pt will sit OOB x 1-2 hrs; pt will tolerate 10 to 15 mins of ther-ex LEs   LTG Expiration Date 09/03/18   Long Term Goal #1 bed mob - mod/min A + 1-2; pt will maintain balance EOB 8 to 10 mins with min A   Long Term Goal #2 pt will sit OOB x 3-4 hrs; balance supported in chair - F-/F   Treatment Day 0   Plan   Treatment/Interventions ADL retraining;Functional transfer training;LE strengthening/ROM; Therapeutic exercise; Endurance training;Cognitive reorientation;Patient/family training;Bed mobility;Gait training   PT Frequency (3-5x/wk)   Recommendation   Recommendation (Rtn to SNF with PT/OT services)   Barthel Index   Feeding 0   Bathing 0   Grooming Score 0   Dressing Score 0   Bladder Score 0   Bowels Score 5   Toilet Use Score 0   Transfers (Bed/Chair) Score 0   Mobility (Level Surface) Score 0   Stairs Score 0   Barthel Index Score 5   Licensure   [place] License Number  [place], [place] [id number]

## 2018-08-20 NOTE — ASSESSMENT & PLAN NOTE
· Uncertain etiology, pt was requiring D10, which has been weaned off     · Cont to follow blood sugars  · CT scan A/P did not reveal any tumors

## 2018-08-20 NOTE — PLAN OF CARE
Problem: DISCHARGE PLANNING - CARE MANAGEMENT  Goal: Discharge to post-acute care or home with appropriate resources  INTERVENTIONS:  - Conduct assessment to determine patient/family and health care team treatment goals, and need for post-acute services based on payer coverage, community resources, and patient preferences, and barriers to discharge  - Address psychosocial, clinical, and financial barriers to discharge as identified in assessment in conjunction with the patient/family and health care team  - Arrange appropriate level of post-acute services according to patient's   needs and preference and payer coverage in collaboration with the physician and health care team  - Communicate with and update the patient/family, physician, and health care team regarding progress on the discharge plan  - Arrange appropriate transportation to post-acute venues  Return to B  Outcome: Progressing

## 2018-08-20 NOTE — SPEECH THERAPY NOTE
Speech-Language Pathology Bedside Swallow Evaluation        Patient Name: Claire Boyd    ZKCWV'T Date: 8/20/2018     Problem List  Patient Active Problem List   Diagnosis    Anemia    Sepsis [facility]    Toxic metabolic encephalopathy    Leukopenia    Thrombocytopenia ([address]    HCAP (healthcare-associated pneumonia)    Multiple sclerosis ([address]    Seizure disorder ([facility]    Bilateral pleural effusion    Hypothermia    Bradycardia    Abnormal MRI, thoracic spine    Atrial fibrillation ([facility]    Bone lesion    Breast neoplasm    Cerebral infarction due to cerebral artery occlusion (HCC)    Cervical spinal stenosis    Depression    Essential hypertension    Lumbosacral spinal stenosis    Manic depressive disorder (HCC)    Mixed hyperlipidemia    Schizophrenia ([facility]    Subclinical hypothyroidism    Hypoglycemia       Past Medical History  Past Medical History:   Diagnosis Date    Atrial fibrillation ([facility]     Bipolar disorder ([facility]     Disease of thyroid gland     Heart failure ([facility] )     afibrillation    Hyperlipidemia     Hypertension     Irregular heart beat     Multiple sclerosis (HCC)     Paralysis ([address] )     left hemiplegia    Psychiatric disorder     depression, schizophrenia    Schizophrenia ([facility]     Stroke ([facility] )     left hemiplegia       Past Surgical History  Past Surgical History:   Procedure Laterality Date    BACK SURGERY      2    CHOLECYSTECTOMY      COLONOSCOPY N/A 1/19/2017    Procedure: COLONOSCOPY;  Surgeon: Kevin Mak MD;  Location: [facility]; Service:     ESOPHAGOGASTRODUODENOSCOPY N/A 1/19/2017    Procedure: ESOPHAGOGASTRODUODENOSCOPY (EGD); Surgeon: Kevin Mak MD;  Location: [facility];   Service:     EYE MUSCLE SURGERY Left     HYSTERECTOMY      TONSILLECTOMY           Current Medical Status  Pt is a 70 y o  female who presented to [address] [place] [address] from a skilled nursing facility for appearance of cyanosis with altered mental status  She was report of some shortness of breath skilled nursing facility where she lives and reported be hypoxic though there was no oxygen saturation provided history  She began complaining of shortness of breath at approximately 15:00 and EMS was summoned approximately 19:00      Past medical history of multiple sclerosis, physical disability, seizure disorder, history of atrial fibrillation on NOAC Eliquis as an outpatient, Hx of CVA w/ chronic Mild L hemiparesis,  HTN, history of documented heart failure no echocardiogram to verify status, hypothyroidism, psychiatric disorder, mild thrombocytopenia  She is pending an ongoing workup for possible metastatic lesions to her spine and breast with Dr Theo Gordon and was scheduled to follow up on 08/29/2018  Bita Lyle lives at [facility] for the last 3-4 years in assisted living  She Has a history of both manic and depressive bipolar symptoms  Her sister provides additional history that over the past 2 months the patient has had become less responsive  She reports that the patient still is able to verbalize, however, especially at night the patient slurs her speech more often and sometimes speaks unintelligibly  Her concern is suspect for possibly over medicating      In the emergency department the patient was treated with BiPAP therapy for acute pulmonary edema and hypoventilation  She was placed on Tridil for her acute pulmonary edema which was down titrated downward then off due to hypotension  She was also treated for sepsis secondary to hospital-acquired pneumonia with positive markers of leukopenia, hypothermia, thrombocytopenia, though, patient lactic acid was normal   She was treated with Levaquin and vancomycin with coverage for pneumococcus MRSA pneumonia and covered for gram-negative pneumonia with cefepime  CT of the head was performed due to persistent alteration in mental status despite correction of respiratory acidosis    CT of the head was grossly normal for acute pathology  Upon my arrival today patient henry admit to dysphagia- specifically with mastication and swallow initiation  She reports change in vocal volume as well as speech clarity at times  She is noted to be lethargic today  RN and CRNP report she has had coughing with intake as well as at baseline inconsistently since admission  Past medical history:   Please see H&P for details    Special Studies:  CT Chest pending    8/20 CXR: Bilateral pleural effusions with bibasilar airspace disease and pulmonary congestion  There has been no interval improvement    8/19 CXR: No significant interval change since 8/18/2018 8/18 CT Head: No acute intracranial abnormality  Microangiopathic changes    8/18 CXR: Prominent central vasculature and bilateral airspace disease likely on the basis of pulmonary edema    Small bibasilar pleural effusions    Social/Education/Vocational Hx:  Pt lives in SNF/ECF    Swallow Information   Current Risks for Dysphagia & Aspiration: dysarthria, AMS, decreased alertness, change in respiratory status and reported new dysphagia     Current Symptoms/Concerns: coughing with po and change in respiratory status    Current Diet: regular diet and thin liquids      Baseline Diet: regular diet and thin liquids per patient however with further discussion it is likely closer to dysph 3 and thin liquids      Baseline Assessment   Behavior/Cognition: lethargic, decreased attention and low alertness level    Speech/Language Status: able to participate in basic conversation and able to follow commands- patient appears to be AOx3 and able to respond appropriately however her SHASHA is currently negatively impacting her ability to converse- furthermore she is noted with mod dysarthria and suspected decreased breath support for speech negatively impacting overall speech intelligibiltiy    Patient Positioning: upright in bed      Swallow Mechanism Exam   Facial: masked facies  Labial: bilateral decreased ROM  Lingual: decreased ROM and bilateral decreased strength  Velum: symmetrical- noted with bruising on L margin of the soft palate/upper margin of the Lfaucial pillar  Mandible:  decreased ROM  Dentition: limited dentition  Vocal quality:weak and noted reduced vocal volume   Volitional Cough: strong/productive   Respiratory: Patient was not on supplemental O2 at the time of my evaluation however is noted to require BiPap at times per chart review  She also required frequent suctioning for expectorated secretions during my assessment  Swallow Mechanics: delayed and weak upon dry swallow     Consistencies Assessed and Performance   Consistencies Administered: thin liquids, nectar thick, honey thick, puree, mechanical soft solids and mixed consistency  Specific materials administered included: applesauce, nutrigrain bar, peaches in juice- liquids were administered via tsp, cup and straw sips    Oral Stage: moderate  Mastication was prolonged and incomplete with peaches and prolonged yet eventually functional with nutrigrain bar  Bolus formation and transfer were slow and incomplete with soft solids with mild-moderate oral residue requiring puree wash to clear  Bolus formation and transfer were slow yet functional with puree textures  Unable to r/o loss of bolus posteriorly due to inconsistent results with liquids- see below  Pharyngeal Stage: moderate   Swallowing initiation timing was variable today ranging from mild to moderately delayed  Laryngeal rise was palpated and judged to be mildly reduced  Coughing was noted intermittently with all consistencies today- including subsequent expectoration of mucus to the level of the oral cavity with need for oral suctioning  Patient was noted with wet vocal quality at times with honey thick liquids- she cleared given min verbal cues to do so  Wet vocal quality was not observed with any other consistency today    No additional coughing, throat clearing, change in vocal quality or respiratory status noted today  SPO2 remained WFL throughout intake  Esophageal Concerns: belching noted    Summary   s/s suggestive of moderate oral and suspected mild-moderate pharyngeal dysphagia as described above  Although coughing was noted during today's assessment it was intermittent and yielded expectoration of what appeared to be mucus (no food/liquid) with need for subsequent suctioning  It is suspected that her wet vocal quality during intake may be due to residue vs mucus in pharyngeal cavity  She does appear to be at risk for aspiration at this time which is primarily due to her lethargy and generalized weakness  VBSS may be indicated with improved SHASHA      Recommendations: puree/level 1 diet and thin liquids with feeding assistance    Recommended Form of Meds: crushed with puree     Aspiration precautions and compensatory swallowing strategies: upright posture, only feed when alert, slow rate of feeding, small bites/sips and alternating bites and sips    Results Reviewed with: patient, RN and CRNP     Treatment Recommendations: ST will continue to follow    Dysphagia Goals: pt will tolerate dysph 3 textures with thin liquids without s/s of aspiration x3 and pt will participate in VBS as indicated    GENNARO Blanchard S , [id number] [facility]  Speech Language Pathologist   [id number]

## 2018-08-20 NOTE — CASE MANAGEMENT
Initial Clinical Review    Admission: Date/Time/Statement: 8/18/18 @ 2023     Orders Placed This Encounter   Procedures    Inpatient Admission (expected length of stay for this patient is greater than two midnights)     Standing Status:   Standing     Number of Occurrences:   1     Order Specific Question:   Admitting Physician     Answer:   Kya Reddy [[id number]]     Order Specific Question:   Level of Care     Answer:   Critical Care [15]     Order Specific Question:   Estimated length of stay     Answer:   More than 2 Midnights     Order Specific Question:   Certification     Answer:   I certify that inpatient services are medically necessary for this patient for a duration of greater than two midnights  See H&P and MD Progress Notes for additional information about the patient's course of treatment  ED: Date/Time/Mode of Arrival:   ED Arrival Information     Expected Arrival Acuity Means of Arrival Escorted By Service Admission Type    - 8/18/2018 19:01 Immediate Ambulance [facility] Pulmonology Emergency    Arrival Complaint    trouble breathing          Chief Complaint:   Chief Complaint   Patient presents with    Chest Pain    Shortness of Breath     started at 3 pm today       History of Illness: A 69 y/o female c/o shortness of breath with lethagy      ED Vital Signs:   ED Triage Vitals   Temperature Pulse Respirations Blood Pressure SpO2   08/18/18 1906 08/18/18 1906 08/18/18 1906 08/18/18 1921 08/18/18 1906   (!) 92 4 °F (33 6 °C) 64 (!) 24 169/74 99 %      Temp Source Heart Rate Source Patient Position - Orthostatic VS BP Location FiO2 (%)   08/18/18 1906 08/18/18 1906 08/18/18 1934 08/18/18 1934 08/18/18 1934   Tympanic Monitor Lying Right arm 50      Pain Score       08/18/18 1906       7        Wt Readings from Last 1 Encounters:   08/18/18 118 kg (259 lb 11 2 oz)       Vital Signs (abnormal):   Date and Time Temp Pulse SpO2 Resp BP   08/18/18 2045 -- 58 98 %  10 102/57   08/18/18 2030 --  50 96 %  8 98/57   08/18/18 2015  92 7 °F (33 7 °C)  50 96 %  8 90/50   08/18/18 1959 --  51 -- -- 131/60   08/18/18 1945 --  52 97 % 18 93/52   08/18/18 1940 --  52 -- 20 121/71   08/18/18 1934  92 °F (33 3 °C)  51 99 % 18 131/58       Abnormal Labs/Diagnostic Test Results: , Na 148, Cl 112, Bun 46, Creat 1 76, Alk Phos 141, WBC 3 57, Hgb 11 4, Platelets    Blood gas, venous [[id number]] (Abnormal)   08/18/2018   Order Status: Completed Lab Status: Final result   Specimen: Blood from Arm, Left    pH, Dov 7 249 (L) 7 300 - 7 400    pCO2, Dov 57 4 (H) 42 0 - 50 0 mm Hg    pO2, Dov 46 2 (H) 35 0 - 45 0 mm Hg    HCO3, Dov 24 5 24 - 30 mmol/L    Base Excess, Dov -3 4 mmol/L    O2 Content, Dov 12 6 ml/dL    O2 HGB, VENOUS 71 5 60 0 - 80 0 %     CXR: Prominent central vasculature and bilateral airspace disease likely on the basis of pulmonary edema   Small bibasilar pleural effusions       ED Treatment:   Medication Administration from 08/18/2018 1901 to 08/18/2018 2102       Date/Time Order Dose Route Action Action by Comments     08/18/2018 2018 nitroGLYcerin (TRIDIL) 50 mg in 250 mL infusion (premix) 0 mcg/min Intravenous Hold Richardstpriyanka Watson RN BP 90/50     08/18/2018 2001 nitroGLYcerin (TRIDIL) 50 mg in 250 mL infusion (premix) 100 mcg/min Intravenous Restarted [address] RN per Dr Degroot Nearing     08/18/2018 1945 nitroGLYcerin (TRIDIL) 50 mg in 250 mL infusion (premix) 0 mcg/min Intravenous Hold [address] RN      08/18/2018 1932 nitroGLYcerin (TRIDIL) 50 mg in 250 mL infusion (premix) 5 mcg/min Intravenous [address] Watson [facility]      08/18/2018 2017 sodium chloride 0 9 % bolus 1,000 mL 0 mL Intravenous Stopped [address], RN      08/18/2018 1935 sodium chloride 0 9 % bolus 1,000 mL 1,000 mL Intravenous [address], [facility]      08/18/2018 2013 levofloxacin (LEVAQUIN) IVPB (premix) 750 mg 750 mg Intravenous [address], [facility]      08/18/2018 2023 vancomycin (VANCOCIN) IVPB (premix) 1,000 mg 1,000 mg Intravenous [address] Paulette Jeffrey, [facility]      08/18/2018 2020 furosemide (LASIX) injection 40 mg 40 mg Intravenous Given Paulette Jeffrey RN           Past Medical/Surgical History: Active Ambulatory Problems     Diagnosis Date Noted    Anemia 03/06/2018    Abnormal MRI, thoracic spine 04/02/2015    Bone lesion 09/05/2017    Breast neoplasm 03/03/2014    Cerebral infarction due to cerebral artery occlusion (HCC) 03/03/2014    Cervical spinal stenosis 04/16/2015    Depression 03/03/2014    Lumbosacral spinal stenosis 04/16/2015     Resolved Ambulatory Problems     Diagnosis Date Noted    No Resolved Ambulatory Problems     Past Medical History:   Diagnosis Date    Atrial fibrillation (HCC)     [facility] disorder [facility] [facility]     Disease of thyroid gland     Heart failure ([facility] [facility] )     Hyperlipidemia     Hypertension     Irregular heart beat     Multiple sclerosis (HCC)     Paralysis ([facility] )     Psychiatric disorder     [facility]     Stroke [facility])        Admitting Diagnosis: Shortness of breath [R06 02]  Hypothermia [T68  XXXA]  Leukopenia [D72 819]  Pulmonary edema [J81 1]  Thrombocytopenia (HCC) [D69 6]  Acute hypercapnic respiratory failure (HCC) [J96 02]    Age/Sex: 70 y o  female    Assessment/Plan:         * HCAP (healthcare-associated pneumonia)   Assessment & Plan     · treating for pneumococcus as well as gram-negative pneumonia, also covering for anaerobic components for aspiration risk, and MRSA coverage- cont Cefepime and Flagyl  · likely treatment for approximately 5-7 days, will trend procalcitonin and taper duration and antibiotic therapy to cultures and sensitivities  · Bipap off, tolerating NC, wean O2 as tolerated  · Albuterol nebs prn          Acute respiratory failure with hypoxia and hypercapnia (HCC)   Assessment & Plan     · improved oxygen saturation and respiratory acidosis with BiPAP  · off BiPAP this morning, tolerating NC  · Wean O2 as tolerated          [facility]   Assessment & Plan     · secondary to probable hospital-acquired pneumonia, suspect Gram-negative, covering for both pneumococcal and MRSA pneumonia          Toxic metabolic encephalopathy   Assessment & Plan     · most likely sepsis related, seems improved this morning  · follow-up on TSH, Depakote, ammonia levels in search of any other causes          Bradycardia   Assessment & Plan     · Asymptomatic, likely secondary to hypothermia  · Rewarming  · hold amiodarone due to bradycardia, can resume once non bradycardic          Acute pulmonary edema (HCC)   Assessment & Plan     · possible heart failure was given 40 mg Lasix in ED and nitroglycerin with improvement of hypoxia  · Check echocardiogram  · trend urine output  · BiPAP no off, will wean O2 as tolerated          Hypothermia   Assessment & Plan     · Unclear if related to hypothyroid or sepsis  · initiated passive rewarming measures with Greer fouzia Alcantara improved this morning          Leukopenia   Assessment & Plan     · likely sepsis related  · Cont antibx  · F/u cultures          Thrombocytopenia (HCC)   Assessment & Plan     · acute on chronic thrombocytopenia  · likely related to sepsis  · no active bleeding          Bilateral pleural effusion   Assessment & Plan     · uncertain cause at this time  · questionable parapneumonic versus possibly related to processes for which patient is being worked up as an outpatient ?   Possible metastatic cancer lesions  · diuresed in the emergency department with 40 mg IV Lasix  · CXR improved overall but still with B/L Effusions          Acquired hypothyroidism   Assessment & Plan     · Cont Levothyroxine  · TSH elevated this morning, Free T4 pending          Manic depressive disorder (HCC)   Assessment & Plan     · Cont Depakote and Abilify          Atrial fibrillation (HCC)   Assessment & Plan     · Cont Xarelto  · Controlled rate          Multiple sclerosis ([facility] )   Assessment & Plan   · patient has a history of multiple sclerosis  · not actively treated at this time  · Supportive care             Mixed hyperlipidemia   Assessment & Plan     · Restart home statin          Essential hypertension   Assessment & Plan     · Hold Norvasc for now, restart when BP's increased          Seizure disorder ([facility] [address] )   Assessment & Plan     · continues on Depakote  · Check Depakote level                Plan:     Neuro:   · Regulate sleep/wake cycle  · Cont home psych meds     CV:   · Rhythm: Sinus Bradycardia  ? Follow rhythm on telemetry     Pulm:   · Tolerating NC, wean as tolerated     GI:   · Nutrition/diet plan: Regular  · Stress ulcer prophylaxis: No prophylaxis needed  · Bowel regimen: Pericolace  ? Last BM: prior to admission     FEN:   · Lytes stable, no intervention  · Electrolytes repleted: not applicable  ? Goal: K >4 0, Mag >2 0, and Phos >3 0     :   · Indwelling Arriola present: Yes   ? Urinary catheter still needed for Strict I and O in a critically ill patient      ID:   · Cont antibx, f/u cultures  · Abx ordered: Cefepime and Flagyl  ? Day # 1 of 7 day course  · Trend temps and WBC count     Heme:   · Hgb stable  · Trend hgb and plts as needed     Endo:   · Hypoglycemic this morning will give 1/2 amp D50 and trend BS     Msk/Skin:  · Mobility goal: OOB as tolerated  ? PT consult: yes  ?  OT consult: yes  · Frequent turning and off-loading     Family:  · Family updated within 24 hours: yes   · Family meeting planned today: no      Lines:  · PIV     VTE Prophylaxis:  · Pharmacologic Prophylaxis: Xarelto  · Mechanical Prophylaxis: sequential compression device     Disposition: Continue Stepdown     Code Status: Level 3 - DNAR and DNI         Admission Orders:  Inpatient/Critical Care  Continuous Cardiac Monitoring  Serial Cardiac Enzymes q3h x 3  Consult Nutrition r/e evaluate/treat   Bilateral Sequential Compression Device  Echocardiogram  CT of Abd/Pelvis  BiPap: 14/5, 40% FiO2  SSI  NPO, sips with meds  D10% @ 50    Scheduled Meds:   Current Facility-Administered Medications:  atorvastatin 10 mg Oral Daily   cefepime 2,000 mg Intravenous Q12H   dextran 70-hypromellose 1 drop Both Eyes BID   dextrose 25 mL Intravenous PRN   divalproex sodium 250 mg Oral Daily   divalproex sodium 500 mg Oral HS   levothyroxine 137 mcg Oral Early Morning   metroNIDAZOLE 500 mg Intravenous Q8H   nystatin 1 application Topical TID   rivaroxaban 15 mg Oral Daily With Breakfast   senna-docusate sodium 2 tablet Oral Daily   tamsulosin 0 8 mg Oral HS   thiamine 100 mg Oral Daily     IV D50% 25 ml x 1 thus far

## 2018-08-20 NOTE — PLAN OF CARE
Plan of care cont      Potential for Falls     Patient will remain free of falls Progressing        Prexisting or High Potential for Compromised Skin Integrity     Skin integrity is maintained or improved Progressing        RESPIRATORY - ADULT     Achieves optimal ventilation and oxygenation Progressing

## 2018-08-21 PROBLEM — E87.70 VOLUME OVERLOAD: Status: ACTIVE | Noted: 2018-08-21

## 2018-08-21 PROBLEM — T68.XXXA HYPOTHERMIA: Status: RESOLVED | Noted: 2018-08-18 | Resolved: 2018-08-21

## 2018-08-21 PROBLEM — D72.819 LEUKOPENIA: Status: RESOLVED | Noted: 2018-08-18 | Resolved: 2018-08-21

## 2018-08-21 PROBLEM — J18.9 HCAP (HEALTHCARE-ASSOCIATED PNEUMONIA): Status: RESOLVED | Noted: 2018-08-18 | Resolved: 2018-08-21

## 2018-08-21 PROBLEM — G47.33 OBSTRUCTIVE SLEEP APNEA: Chronic | Status: ACTIVE | Noted: 2018-08-21

## 2018-08-21 PROBLEM — A41.9 SEPSIS (HCC): Status: RESOLVED | Noted: 2018-08-18 | Resolved: 2018-08-21

## 2018-08-21 LAB
ANION GAP SERPL CALCULATED.3IONS-SCNC: 8 MMOL/L (ref 4–13)
ATRIAL RATE: 48 BPM
BACTERIA UR CULT: ABNORMAL
BASOPHILS # BLD AUTO: 0.02 THOUSANDS/ΜL (ref 0–0.1)
BASOPHILS NFR BLD AUTO: 0 % (ref 0–1)
BUN SERPL-MCNC: 38 MG/DL (ref 5–25)
C PEPTIDE SERPL-MCNC: 5.3 NG/ML (ref 1.1–4.4)
CALCIUM SERPL-MCNC: 8.9 MG/DL (ref 8.3–10.1)
CHLORIDE SERPL-SCNC: 110 MMOL/L (ref 100–108)
CO2 SERPL-SCNC: 27 MMOL/L (ref 21–32)
CREAT SERPL-MCNC: 2.03 MG/DL (ref 0.6–1.3)
EOSINOPHIL # BLD AUTO: 0.04 THOUSAND/ΜL (ref 0–0.61)
EOSINOPHIL NFR BLD AUTO: 1 % (ref 0–6)
ERYTHROCYTE [DISTWIDTH] IN BLOOD BY AUTOMATED COUNT: 16.6 % (ref 11.6–15.1)
GFR SERPL CREATININE-BSD FRML MDRD: 24 ML/MIN/1.73SQ M
GLUCOSE SERPL-MCNC: 101 MG/DL (ref 65–140)
GLUCOSE SERPL-MCNC: 106 MG/DL (ref 65–140)
GLUCOSE SERPL-MCNC: 114 MG/DL (ref 65–140)
GLUCOSE SERPL-MCNC: 127 MG/DL (ref 65–140)
GLUCOSE SERPL-MCNC: 129 MG/DL (ref 65–140)
GLUCOSE SERPL-MCNC: 75 MG/DL (ref 65–140)
GLUCOSE SERPL-MCNC: 80 MG/DL (ref 65–140)
GLUCOSE SERPL-MCNC: 80 MG/DL (ref 65–140)
GLUCOSE SERPL-MCNC: 88 MG/DL (ref 65–140)
GLUCOSE SERPL-MCNC: 88 MG/DL (ref 65–140)
GLUCOSE SERPL-MCNC: 89 MG/DL (ref 65–140)
GLUCOSE SERPL-MCNC: 91 MG/DL (ref 65–140)
HCT VFR BLD AUTO: 34.1 % (ref 34.8–46.1)
HGB BLD-MCNC: 10.4 G/DL (ref 11.5–15.4)
IMM GRANULOCYTES # BLD AUTO: 0.03 THOUSAND/UL (ref 0–0.2)
IMM GRANULOCYTES NFR BLD AUTO: 1 % (ref 0–2)
LYMPHOCYTES # BLD AUTO: 1 THOUSANDS/ΜL (ref 0.6–4.47)
LYMPHOCYTES NFR BLD AUTO: 17 % (ref 14–44)
MAGNESIUM SERPL-MCNC: 1.9 MG/DL (ref 1.6–2.6)
MCH RBC QN AUTO: 30.1 PG (ref 26.8–34.3)
MCHC RBC AUTO-ENTMCNC: 30.5 G/DL (ref 31.4–37.4)
MCV RBC AUTO: 99 FL (ref 82–98)
MONOCYTES # BLD AUTO: 0.89 THOUSAND/ΜL (ref 0.17–1.22)
MONOCYTES NFR BLD AUTO: 15 % (ref 4–12)
NEUTROPHILS # BLD AUTO: 3.83 THOUSANDS/ΜL (ref 1.85–7.62)
NEUTS SEG NFR BLD AUTO: 66 % (ref 43–75)
NRBC BLD AUTO-RTO: 0 /100 WBCS
PLATELET # BLD AUTO: 52 THOUSANDS/UL (ref 149–390)
PMV BLD AUTO: 13.3 FL (ref 8.9–12.7)
POTASSIUM SERPL-SCNC: 4.2 MMOL/L (ref 3.5–5.3)
QRS AXIS: 50 DEGREES
QRSD INTERVAL: 102 MS
QT INTERVAL: 444 MS
QTC INTERVAL: 475 MS
RBC # BLD AUTO: 3.45 MILLION/UL (ref 3.81–5.12)
SODIUM SERPL-SCNC: 145 MMOL/L (ref 136–145)
T WAVE AXIS: 248 DEGREES
VENTRICULAR RATE: 69 BPM
WBC # BLD AUTO: 5.81 THOUSAND/UL (ref 4.31–10.16)

## 2018-08-21 PROCEDURE — 80048 BASIC METABOLIC PNL TOTAL CA: CPT | Performed by: PHYSICIAN ASSISTANT

## 2018-08-21 PROCEDURE — 99233 SBSQ HOSP IP/OBS HIGH 50: CPT | Performed by: NURSE PRACTITIONER

## 2018-08-21 PROCEDURE — 93306 TTE W/DOPPLER COMPLETE: CPT | Performed by: INTERNAL MEDICINE

## 2018-08-21 PROCEDURE — 82948 REAGENT STRIP/BLOOD GLUCOSE: CPT

## 2018-08-21 PROCEDURE — 83735 ASSAY OF MAGNESIUM: CPT | Performed by: PHYSICIAN ASSISTANT

## 2018-08-21 PROCEDURE — 93010 ELECTROCARDIOGRAM REPORT: CPT | Performed by: INTERNAL MEDICINE

## 2018-08-21 PROCEDURE — 94760 N-INVAS EAR/PLS OXIMETRY 1: CPT

## 2018-08-21 PROCEDURE — 85025 COMPLETE CBC W/AUTO DIFF WBC: CPT | Performed by: PHYSICIAN ASSISTANT

## 2018-08-21 PROCEDURE — 94669 MECHANICAL CHEST WALL OSCILL: CPT

## 2018-08-21 PROCEDURE — 94640 AIRWAY INHALATION TREATMENT: CPT

## 2018-08-21 PROCEDURE — 92526 ORAL FUNCTION THERAPY: CPT

## 2018-08-21 RX ORDER — FUROSEMIDE 10 MG/ML
40 INJECTION INTRAMUSCULAR; INTRAVENOUS ONCE
Status: COMPLETED | OUTPATIENT
Start: 2018-08-21 | End: 2018-08-21

## 2018-08-21 RX ADMIN — DIVALPROEX SODIUM 250 MG: 500 TABLET, EXTENDED RELEASE ORAL at 08:15

## 2018-08-21 RX ADMIN — FUROSEMIDE 40 MG: 10 INJECTION, SOLUTION INTRAMUSCULAR; INTRAVENOUS at 09:50

## 2018-08-21 RX ADMIN — DIVALPROEX SODIUM 500 MG: 500 TABLET, EXTENDED RELEASE ORAL at 21:33

## 2018-08-21 RX ADMIN — SENNOSIDES AND DOCUSATE SODIUM 1 TABLET: 8.6; 5 TABLET ORAL at 21:33

## 2018-08-21 RX ADMIN — DEXTRAN 70 AND HYPROMELLOSE 2910 1 DROP: 1; 3 SOLUTION/ DROPS OPHTHALMIC at 08:14

## 2018-08-21 RX ADMIN — RIVAROXABAN 15 MG: 15 TABLET, FILM COATED ORAL at 07:26

## 2018-08-21 RX ADMIN — LEVOTHYROXINE SODIUM 137 MCG: 112 TABLET ORAL at 05:21

## 2018-08-21 RX ADMIN — NYSTATIN 1 APPLICATION: 100000 POWDER TOPICAL at 08:14

## 2018-08-21 RX ADMIN — DEXTRAN 70 AND HYPROMELLOSE 2910 1 DROP: 1; 3 SOLUTION/ DROPS OPHTHALMIC at 17:48

## 2018-08-21 RX ADMIN — ATORVASTATIN CALCIUM 10 MG: 10 TABLET, FILM COATED ORAL at 08:13

## 2018-08-21 RX ADMIN — TAMSULOSIN HYDROCHLORIDE 0.8 MG: 0.4 CAPSULE ORAL at 21:33

## 2018-08-21 RX ADMIN — ALBUTEROL SULFATE 2.5 MG: 2.5 SOLUTION RESPIRATORY (INHALATION) at 08:11

## 2018-08-21 RX ADMIN — ALBUTEROL SULFATE 2.5 MG: 2.5 SOLUTION RESPIRATORY (INHALATION) at 14:31

## 2018-08-21 RX ADMIN — NYSTATIN 1 APPLICATION: 100000 POWDER TOPICAL at 21:33

## 2018-08-21 RX ADMIN — Medication 100 MG: at 08:14

## 2018-08-21 RX ADMIN — NYSTATIN 1 APPLICATION: 100000 POWDER TOPICAL at 15:18

## 2018-08-21 RX ADMIN — SENNOSIDES AND DOCUSATE SODIUM 1 TABLET: 8.6; 5 TABLET ORAL at 08:13

## 2018-08-21 NOTE — PROGRESS NOTES
Daily Progress Note - Critical Care/ Stepdown   Parrish Davis 70 y o  female MRN: [id number]  Unit/Bed#: ICU 08 Encounter: [id number]    ______________________________________________________________________  Assessment:   Principal Problem:    Volume overload  Active Problems:    Hypoglycemia    Bradycardia    Toxic metabolic encephalopathy    Thrombocytopenia (HCC)    Bilateral pleural effusion    Subclinical hypothyroidism    Obstructive sleep apnea    Manic depressive disorder (HCC)    Schizophrenia (HCC)    Atrial fibrillation (HCC)    Multiple sclerosis (HCC)    Seizure disorder (HCC)    Essential hypertension    Mixed hyperlipidemia  Resolved Problems:    HCAP (healthcare-associated pneumonia)    Acute respiratory failure with hypoxia and hypercapnia (HCC)    Sepsis (HCC)    Acute pulmonary edema (HCC)    Leukopenia    Hypothermia      * Volume overload   Assessment & Plan    · Lasix 40 mg IV yesterday and repeat again today  · Great urine output, will trend        HCAP (healthcare-associated pneumonia)resolved as of 8/21/2018   Assessment & Plan    · Antibx stopped, felt to be overload and not PNA        Hypoglycemia   Assessment & Plan    · Uncertain etiology, will attempt to wean off D10  · Cont to follow blood sugars  · CT scan A/P did not reveal any tumors        Acute respiratory failure with hypoxia and hypercapnia (HCC)resolved as of 8/20/2018   Assessment & Plan    · improved oxygen saturation and respiratory acidosis with BiPAP  · off BiPAP this morning, tolerating NC  · Wean O2 as tolerated        Bradycardia   Assessment & Plan    · Cont to hold amiodarone due to bradycardia, can resume once non bradycardic  · Asymptomatic, less episodes have been noted        Sepsis (HCC)resolved as of 8/21/2018   Assessment & Plan    · secondary to probable hospital-acquired pneumonia, suspect Gram-negative, covering for both pneumococcal and MRSA pneumonia        Toxic metabolic encephalopathy   Assessment & Plan    · possibly sepsis related, though this is acute on chronic by history > improved from admission : persistent  · TSH, depakote, levels wnl  · depakote and Abilify have been held w/o significant improvement  · Consider MRI of brain         Acute pulmonary edema (HCC)resolved as of 8/20/2018   Assessment & Plan    · possible heart failure was given 40 mg Lasix in ED and nitroglycerin with improvement of hypoxia  · Check echocardiogram  · trend urine output  · BiPAP no off, will wean O2 as tolerated        Hypothermiaresolved as of 8/21/2018   Assessment & Plan    · Unclear if related to hypothyroid or sepsis  · initiated passive rewarming measures with Greer Hugger, temp improved this morning        Leukopeniaresolved as of 8/21/2018   Assessment & Plan    · likely sepsis related  · Cont antibx  · F/u cultures        Thrombocytopenia (HCC)   Assessment & Plan    · acute on chronic thrombocytopenia > chronic component ? Related to possible malignancy  · likely related to sepsis  · no active bleeding  · Trend platelets for improvement        Bilateral pleural effusion   Assessment & Plan    · uncertain cause at this time  · questionable parapneumonic versus possibly related to processes for which patient is being worked up as an outpatient ?   Possible metastatic cancer lesions  · diuresed with 40 mg IV Lasix again today, will trend  · CXR improved overall but still with B/L Effusions > continue diuresis as able         Subclinical hypothyroidism   Assessment & Plan    · Cont Levothyroxine  · TSH elevated @ 7 > T4 > 1 4 wnl        Obstructive sleep apnea   Assessment & Plan    · Use Bipap at bedtime        [facility]   Assessment & Plan    · Chronically on abilify and depakote        Manic depressive disorder (HCC)   Assessment & Plan    · Cont Depakote   · Abilify currently held        Atrial fibrillation ([facility] [address] )   Assessment & Plan    · Cont Xarelto  · Controlled rate        Multiple sclerosis ([facility] Assessment & Plan    · patient has a history of multiple sclerosis  · not actively treated at this time  · Supportive care          Mixed hyperlipidemia   Assessment & Plan    · Restart home statin        Essential hypertension   Assessment & Plan    · Hold Norvasc for now, restart when BP's increased        Seizure disorder ([address] )   Assessment & Plan    · Unclear history of possible Seizure disorder with history of "convulsions" in pt chart  · Subtherapeutic depakote levels  · Pt meds have been held due to poor mentation and ability to swallow            Plan:    Neuro:   · Delirium: CAM ICU positive no   · Regulate sleep/wake cycle    CV:   · Rhythm: NSR  · Follow rhythm on telemetry    Pulm:   · Wean O2 as tolerated    GI:   · Nutrition/diet plan: Dyphagia puree  · Stress ulcer prophylaxis: No prophylaxis needed  · Bowel regimen: None currently  · Last BM: 8/20    FEN:   · Labs stable  · Fluid/Diuretic plan: Needs diuresis lasix IV  · Electrolytes repleted: not applicable  · Goal: K >6 7, Mag >2 0, and Phos >3 0    :   · Indwelling Arriola present: yes   · Urinary catheter still needed for Strict I and O in a critically ill patient  ID:   · No active source  · Trend temps and WBC count    Heme:   · Hgb stable  · Trend hgb and plts    Endo:   · BS better controlled, D10 being weaned off    Msk/Skin:  · Mobility goal: OOB to chair as tolerated  · PT consult: yes  · OT consult: yes  · Frequent turning and off-loading    Family:  · Family updated within 24 hours: yes   · Family meeting planned today: no     Lines:  · PIV    VTE Prophylaxis:  · Pharmacologic Prophylaxis: Eliquis  · Mechanical Prophylaxis: sequential compression device    Disposition: Continue Stepdown    Code Status: Level 3 - DNAR and DNI    Counseling / Coordination of Care  Total time spent today 34 minutes  Greater than 50% of total time was spent with the patient and / or family counseling and / or coordination of care   A description of the counseling / coordination of care: reviewing chart, evaluatin gpatient, writing note, placing orders  ______________________________________________________________________    HPI/24hr events: No issues overnight; tolerated Bipap for sleep; states she is feeling better, denies pain and denies SOB    ______________________________________________________________________    Physical Exam:   Physical Exam   Constitutional: She appears well-developed and well-nourished  She appears lethargic  She is easily aroused  No distress  HENT:   Head: Normocephalic  Eyes: Pupils are equal, round, and reactive to light  Neck: Normal range of motion  Cardiovascular: Normal rate and regular rhythm  Pulmonary/Chest: Effort normal  No respiratory distress  She has decreased breath sounds in the right lower field and the left lower field  Abdominal: Soft  Bowel sounds are normal  She exhibits no distension  There is no tenderness  Genitourinary:   Genitourinary Comments: Arriola patent for yellow urine   Musculoskeletal: She exhibits edema  Neurological: She is easily aroused  She appears lethargic  Skin: Skin is warm and dry  Capillary refill takes less than 2 seconds  She is not diaphoretic  Psychiatric: Her speech is delayed  She is slowed  Nursing note and vitals reviewed  ______________________________________________________________________  Vitals:    18 1546 18 1600 18 1612 18 1700   BP:   116/63    BP Location:       Pulse:  68 68 68   Resp:  19 16 16   Temp: 97 9 °F (36 6 °C)      TempSrc: Temporal      SpO2:  96% 95% 96%   Weight:       Height:                  Temperature:   Temp (24hrs), Av 6 °F (36 4 °C), Min:96 6 °F (35 9 °C), Max:98 8 °F (37 1 °C)    Current Temperature: 97 9 °F (36 6 °C)    Weights:   IBW: 57 kg    Body mass index is 43 66 kg/m²    Weight (last 2 days)     Date/Time   Weight    18 0548  119 (262 35)              Hemodynamic Monitoring:  N/A Non-Invasive/Invasive Ventilation Settings:  Respiratory    Lab Data (Last 4 hours)    None         O2/Vent Data (Last 4 hours)    None              No results found for: PHART, UHY2EIT, PO2ART, XSX7NQD, M0IGBVLH, BEART, SOURCE  SpO2: SpO2: 96 %, SpO2 Device: O2 Device: Nasal cannula    Intake and Outputs:  I/O       08/19 0701 - 08/20 0700 08/20 0701 - 08/21 0700 08/21 0701 - 08/22 0700    P  O  200 780 480    I V  (mL/kg) 812 5 (6 9) 30 (0 3) 1070 8 (9)    IV Piggyback 450 200     Total Intake(mL/kg) 1462 5 (12 4) 1010 (8 5) 1550 8 (13)    Urine (mL/kg/hr) 1430 (0 5) 2850 (1) 2210 (1 7)    Total Output 1430 2850 2210    Net +32 5 -1840 -659 2           Unmeasured Stool Occurrence  1 x 2 x          Nutrition:        Diet Orders            Start     Ordered    08/20/18 1152  Diet Dysphagia/Modified Consistency; Dysphagia 1-Pureed; Thin Liquid  Diet effective now     Question Answer Comment   Diet Type Dysphagia/Modified Consistency    Dysphagia/Modified Consistency Dysphagia 1-Pureed    Liquid Modifier Thin Liquid    RD to adjust diet per protocol?  No        08/20/18 1151    08/20/18 0803  Room Service  Once     Question:  Type of Service  Answer:  Room Service - Appropriate with Assistance    08/20/18 0802        Labs:     Results from last 7 days  Lab Units 08/21/18  0617 08/20/18  0519 08/19/18  0503   WBC Thousand/uL 5 81 3 58* 2 74*   HEMOGLOBIN g/dL 10 4* 10 6* 10 1*   HEMATOCRIT % 34 1* 35 1 32 6*   PLATELETS Thousands/uL 52* 52* 52*   NEUTROS PCT % 66 54 65   MONOS PCT % 15* 18* 12       Results from last 7 days  Lab Units 08/21/18  0617 08/20/18  0519 08/19/18  0503 08/18/18  1915   SODIUM mmol/L 145 147* 149* 148*   POTASSIUM mmol/L 4 2 4 7 4 9 5 3   CHLORIDE mmol/L 110* 114* 114* 112*   CO2 mmol/L 27 26 25 27   BUN mg/dL 38* 41* 46* 46*   CREATININE mg/dL 2 03* 1 96* 1 68* 1 76*   CALCIUM mg/dL 8 9 8 8 8 8 9 6   TOTAL PROTEIN g/dL  --   --  6 5 7 5   BILIRUBIN TOTAL mg/dL  --   --  0 60 0 70   ALK PHOS U/L  --   --  111 141*   ALT U/L  --   --  27 31   AST U/L  --   --  21 26   GLUCOSE RANDOM mg/dL 88 83 49* 78       Results from last 7 days  Lab Units 08/21/18  0617 08/20/18  0519 08/19/18  0503   MAGNESIUM mg/dL 1 9 2 2 1 8     Lab Results   Component Value Date    PHOS 3 7 08/20/2018    PHOS 3 8 08/19/2018        Results from last 7 days  Lab Units 08/18/18  1915   INR  1 14   PTT seconds 39*       0  Lab Value Date/Time   TROPONINI <0 02 08/18/2018 1915       Results from last 7 days  Lab Units 08/18/18  1915   LACTIC ACID mmol/L 0 9     ABG:  Lab Results   Component Value Date    PHART 7 320 (L) 08/19/2018    IEZ2NXM 46 9 (H) 08/19/2018    PO2ART 76 0 08/19/2018    NAJ0KRU 23 6 08/19/2018    BEART -2 6 08/19/2018    SOURCE Radial, Right 08/19/2018       Imaging: CXR: None today I have personally reviewed pertinent reports  EKG: NSR    Micro:  Lab Results   Component Value Date    BLOODCX No Growth at 48 hrs  08/18/2018    BLOODCX No Growth at 48 hrs  08/18/2018    URINECX 50,000-59,000 cfu/ml Morganella morganii (A) 08/18/2018    URINECX >100,000 cfu/ml Mixed Contaminants X6 09/28/2016    URINECX >100,000 cfu/ml Pseudomonas aeruginosa 06/23/2016       Allergies:    Allergies   Allergen Reactions    Ciprocinonide [Fluocinolone] Itching    Darvon [Propoxyphene] Itching    Keflex [Cephalexin] Rash    Lithium Itching    Penicillins Itching    Sulfa Antibiotics Hives    Zithromax [Azithromycin] Rash    Ethylenediamine Tetra-Acetate [Edetic Acid] Itching     Medications:   Scheduled Meds:  Current Facility-Administered Medications:  albuterol 2 5 mg Nebulization Q4H PRN HAI Lucas   atorvastatin 10 mg Oral Daily Jesika Gleason PA-C   dextran 70-hypromellose 1 drop Both Eyes BID HAI Lucas   dextrose 25 mL Intravenous PRN Jesika Gleason PA-C   divalproex sodium 250 mg Oral Daily Jesika Gleason PA-C   divalproex sodium 500 mg Oral HS Jesika Gleason PA-C   levothyroxine 137 mcg Oral Early Morning Chinmay FullANAMARIA barber   nystatin 1 application Topical TID Chinmay FullANAMARIA barber   rivaroxaban 15 mg Oral Daily With Breakfast HAI You   senna-docusate sodium 1 tablet Oral BID HAI You   tamsulosin 0 8 mg Oral HS HAI You   thiamine 100 mg Oral Daily Chinmay FullANAMARIA barber     Continuous Infusions:   PRN Meds:    albuterol 2 5 mg Q4H PRN   dextrose 25 mL PRN       Invasive lines and devices:   Invasive Devices     Peripheral Intravenous Line            Peripheral IV 08/18/18 Left Antecubital 2 days    Peripheral IV 08/18/18 Left Forearm 2 days          Drain            Urethral Catheter Non-latex 16 Fr  2 days                   SIGNATURE: HAI You  DATE: August 21, 2018

## 2018-08-21 NOTE — SPEECH THERAPY NOTE
Speech Language/Pathology    Speech/Language Pathology Progress Note    Patient Name: Ilsa Vaughan  FLJRR'S Date: 8/21/2018     Problem List  Patient Active Problem List   Diagnosis    Anemia    Sepsis ([facility]    Toxic metabolic encephalopathy    Leukopenia    Thrombocytopenia ([address]    HCAP (healthcare-associated pneumonia)    Multiple sclerosis ([facility]    Seizure disorder ([facility]    Bilateral pleural effusion    Hypothermia    Bradycardia    Abnormal MRI, thoracic spine    Atrial fibrillation ([facility]    Bone lesion    Breast neoplasm    Cerebral infarction due to cerebral artery occlusion (HCC)    Cervical spinal stenosis    Depression    Essential hypertension    Lumbosacral spinal stenosis    Manic depressive disorder (HCC)    Mixed hyperlipidemia    Schizophrenia ([facility]    Subclinical hypothyroidism    Hypoglycemia        Past Medical History  Past Medical History:   Diagnosis Date    Atrial fibrillation ([facility]     Bipolar disorder ([facility]     Disease of thyroid gland     Heart failure ([facility]     afibrillation    Hyperlipidemia     Hypertension     Irregular heart beat     Multiple sclerosis (HCC)     Paralysis ([facility]     left hemiplegia    Psychiatric disorder     depression, schizophrenia    Schizophrenia ([facility]     Stroke [facility]     left hemiplegia        Past Surgical History  Past Surgical History:   Procedure Laterality Date    BACK SURGERY      2    CHOLECYSTECTOMY      COLONOSCOPY N/A 1/19/2017    Procedure: COLONOSCOPY;  Surgeon: Colleen Cody MD;  Location: [facility]; Service:     ESOPHAGOGASTRODUODENOSCOPY N/A 1/19/2017    Procedure: ESOPHAGOGASTRODUODENOSCOPY (EGD); Surgeon: Colleen Cody MD;  Location: [facility]; Service:     EYE MUSCLE SURGERY Left     HYSTERECTOMY      TONSILLECTOMY       Subjective:  Patient awake and alert reclined in bed    Wet voice quality prior to exam, coughing prior to exam following bed elevated to 90 degrees, voice quality clear before materials administered  Patient swallowed thin liquid from a straw without a delay in the initiation of the swallow, good laryngeal elevation, no coughing or choking during or following the swallow, clear voice quality following all swallows  All stages of the swallow  Objective: Wet voice quality prior to exam, coughing prior to exam following bed elevated to 90 degrees, voice quality clear before materials administered  Patient swallowed thin liquid from a straw without a delay in the initiation of the swallow, good laryngeal elevation, no coughing or choking during or following the swallow, clear voice quality following all swallows  All stages of the swallow WNL for puree consistencies  Patient reported that she ate regular consistency solids prior to admission but had difficulty eating them due to loss of upper teeth  Assessment:  Swallow skills are safe and WFL for puree solids and thin liquids  Plan/Recommendations:  1)  Continue a puree diet with thin liquids  2)  Aspiration precautions      GENNARO Ruiz , [address] Pathologist  [id number]

## 2018-08-21 NOTE — ASSESSMENT & PLAN NOTE
· Lasix 40 mg IV yesterday with 3 13 liters out  Pt is neg 1 5 L for 24 hrs, -2 8 L for admission  · Continue with lasix 20 mg IV q 12 hrs for more even diuresis

## 2018-08-21 NOTE — PLAN OF CARE
Plan of care cont      DISCHARGE PLANNING - CARE MANAGEMENT     Discharge to post-acute care or home with appropriate resources Progressing        Nutrition/Hydration-ADULT     Nutrient/Hydration intake appropriate for improving, restoring or maintaining nutritional needs Progressing        Potential for Falls     Patient will remain free of falls Progressing        Prexisting or High Potential for Compromised Skin Integrity     Skin integrity is maintained or improved Progressing        RESPIRATORY - ADULT     Achieves optimal ventilation and oxygenation Progressing

## 2018-08-22 ENCOUNTER — APPOINTMENT (INPATIENT)
Dept: RADIOLOGY | Facility: HOSPITAL | Age: 68
DRG: 871 | End: 2018-08-22
Payer: MEDICARE

## 2018-08-22 PROBLEM — E16.2 HYPOGLYCEMIA: Status: RESOLVED | Noted: 2018-08-20 | Resolved: 2018-08-22

## 2018-08-22 LAB
ANION GAP SERPL CALCULATED.3IONS-SCNC: 9 MMOL/L (ref 4–13)
BASOPHILS # BLD AUTO: 0.01 THOUSANDS/ΜL (ref 0–0.1)
BASOPHILS NFR BLD AUTO: 0 % (ref 0–1)
BUN SERPL-MCNC: 31 MG/DL (ref 5–25)
CALCIUM SERPL-MCNC: 9.3 MG/DL (ref 8.3–10.1)
CHLORIDE SERPL-SCNC: 108 MMOL/L (ref 100–108)
CO2 SERPL-SCNC: 28 MMOL/L (ref 21–32)
CREAT SERPL-MCNC: 1.76 MG/DL (ref 0.6–1.3)
EOSINOPHIL # BLD AUTO: 0.03 THOUSAND/ΜL (ref 0–0.61)
EOSINOPHIL NFR BLD AUTO: 1 % (ref 0–6)
ERYTHROCYTE [DISTWIDTH] IN BLOOD BY AUTOMATED COUNT: 16.3 % (ref 11.6–15.1)
GFR SERPL CREATININE-BSD FRML MDRD: 29 ML/MIN/1.73SQ M
GLUCOSE SERPL-MCNC: 105 MG/DL (ref 65–140)
GLUCOSE SERPL-MCNC: 108 MG/DL (ref 65–140)
GLUCOSE SERPL-MCNC: 133 MG/DL (ref 65–140)
GLUCOSE SERPL-MCNC: 75 MG/DL (ref 65–140)
GLUCOSE SERPL-MCNC: 80 MG/DL (ref 65–140)
HCT VFR BLD AUTO: 33.9 % (ref 34.8–46.1)
HGB BLD-MCNC: 10.7 G/DL (ref 11.5–15.4)
IMM GRANULOCYTES # BLD AUTO: 0.03 THOUSAND/UL (ref 0–0.2)
IMM GRANULOCYTES NFR BLD AUTO: 1 % (ref 0–2)
LYMPHOCYTES # BLD AUTO: 0.93 THOUSANDS/ΜL (ref 0.6–4.47)
LYMPHOCYTES NFR BLD AUTO: 16 % (ref 14–44)
MAGNESIUM SERPL-MCNC: 1.8 MG/DL (ref 1.6–2.6)
MCH RBC QN AUTO: 30.7 PG (ref 26.8–34.3)
MCHC RBC AUTO-ENTMCNC: 31.6 G/DL (ref 31.4–37.4)
MCV RBC AUTO: 97 FL (ref 82–98)
MONOCYTES # BLD AUTO: 1 THOUSAND/ΜL (ref 0.17–1.22)
MONOCYTES NFR BLD AUTO: 17 % (ref 4–12)
NEUTROPHILS # BLD AUTO: 3.91 THOUSANDS/ΜL (ref 1.85–7.62)
NEUTS SEG NFR BLD AUTO: 65 % (ref 43–75)
NRBC BLD AUTO-RTO: 0 /100 WBCS
PHOSPHATE SERPL-MCNC: 3.2 MG/DL (ref 2.3–4.1)
PLATELET # BLD AUTO: 58 THOUSANDS/UL (ref 149–390)
PMV BLD AUTO: 11.9 FL (ref 8.9–12.7)
POTASSIUM SERPL-SCNC: 3.8 MMOL/L (ref 3.5–5.3)
RBC # BLD AUTO: 3.49 MILLION/UL (ref 3.81–5.12)
SODIUM SERPL-SCNC: 145 MMOL/L (ref 136–145)
WBC # BLD AUTO: 5.91 THOUSAND/UL (ref 4.31–10.16)

## 2018-08-22 PROCEDURE — 97535 SELF CARE MNGMENT TRAINING: CPT

## 2018-08-22 PROCEDURE — 85025 COMPLETE CBC W/AUTO DIFF WBC: CPT | Performed by: NURSE PRACTITIONER

## 2018-08-22 PROCEDURE — 71045 X-RAY EXAM CHEST 1 VIEW: CPT

## 2018-08-22 PROCEDURE — 83735 ASSAY OF MAGNESIUM: CPT | Performed by: NURSE PRACTITIONER

## 2018-08-22 PROCEDURE — 80048 BASIC METABOLIC PNL TOTAL CA: CPT | Performed by: NURSE PRACTITIONER

## 2018-08-22 PROCEDURE — 82948 REAGENT STRIP/BLOOD GLUCOSE: CPT

## 2018-08-22 PROCEDURE — 97530 THERAPEUTIC ACTIVITIES: CPT

## 2018-08-22 PROCEDURE — 99233 SBSQ HOSP IP/OBS HIGH 50: CPT | Performed by: INTERNAL MEDICINE

## 2018-08-22 PROCEDURE — 84100 ASSAY OF PHOSPHORUS: CPT | Performed by: NURSE PRACTITIONER

## 2018-08-22 PROCEDURE — 94669 MECHANICAL CHEST WALL OSCILL: CPT

## 2018-08-22 PROCEDURE — 94760 N-INVAS EAR/PLS OXIMETRY 1: CPT

## 2018-08-22 PROCEDURE — 92526 ORAL FUNCTION THERAPY: CPT

## 2018-08-22 RX ORDER — ACETAMINOPHEN 325 MG/1
650 TABLET ORAL EVERY 6 HOURS PRN
Status: DISCONTINUED | OUTPATIENT
Start: 2018-08-22 | End: 2018-08-28 | Stop reason: HOSPADM

## 2018-08-22 RX ORDER — MAGNESIUM SULFATE HEPTAHYDRATE 40 MG/ML
2 INJECTION, SOLUTION INTRAVENOUS ONCE
Status: COMPLETED | OUTPATIENT
Start: 2018-08-22 | End: 2018-08-22

## 2018-08-22 RX ORDER — FUROSEMIDE 10 MG/ML
20 INJECTION INTRAMUSCULAR; INTRAVENOUS EVERY 12 HOURS
Status: DISCONTINUED | OUTPATIENT
Start: 2018-08-22 | End: 2018-08-27

## 2018-08-22 RX ORDER — POTASSIUM CHLORIDE 20 MEQ/1
20 TABLET, EXTENDED RELEASE ORAL ONCE
Status: COMPLETED | OUTPATIENT
Start: 2018-08-22 | End: 2018-08-22

## 2018-08-22 RX ADMIN — LEVOTHYROXINE SODIUM 137 MCG: 112 TABLET ORAL at 05:59

## 2018-08-22 RX ADMIN — Medication 100 MG: at 08:58

## 2018-08-22 RX ADMIN — DEXTRAN 70 AND HYPROMELLOSE 2910 1 DROP: 1; 3 SOLUTION/ DROPS OPHTHALMIC at 18:39

## 2018-08-22 RX ADMIN — RIVAROXABAN 15 MG: 15 TABLET, FILM COATED ORAL at 08:57

## 2018-08-22 RX ADMIN — SENNOSIDES AND DOCUSATE SODIUM 1 TABLET: 8.6; 5 TABLET ORAL at 21:52

## 2018-08-22 RX ADMIN — MAGNESIUM SULFATE HEPTAHYDRATE 2 G: 40 INJECTION, SOLUTION INTRAVENOUS at 11:14

## 2018-08-22 RX ADMIN — NYSTATIN 1 APPLICATION: 100000 POWDER TOPICAL at 08:59

## 2018-08-22 RX ADMIN — SENNOSIDES AND DOCUSATE SODIUM 1 TABLET: 8.6; 5 TABLET ORAL at 08:58

## 2018-08-22 RX ADMIN — DIVALPROEX SODIUM 250 MG: 500 TABLET, EXTENDED RELEASE ORAL at 08:59

## 2018-08-22 RX ADMIN — FUROSEMIDE 20 MG: 10 INJECTION, SOLUTION INTRAMUSCULAR; INTRAVENOUS at 10:17

## 2018-08-22 RX ADMIN — NYSTATIN 1 APPLICATION: 100000 POWDER TOPICAL at 21:52

## 2018-08-22 RX ADMIN — POTASSIUM CHLORIDE 20 MEQ: 1500 TABLET, EXTENDED RELEASE ORAL at 11:13

## 2018-08-22 RX ADMIN — TAMSULOSIN HYDROCHLORIDE 0.8 MG: 0.4 CAPSULE ORAL at 21:52

## 2018-08-22 RX ADMIN — ATORVASTATIN CALCIUM 10 MG: 10 TABLET, FILM COATED ORAL at 08:58

## 2018-08-22 RX ADMIN — DEXTRAN 70 AND HYPROMELLOSE 2910 1 DROP: 1; 3 SOLUTION/ DROPS OPHTHALMIC at 08:59

## 2018-08-22 RX ADMIN — FUROSEMIDE 20 MG: 10 INJECTION, SOLUTION INTRAMUSCULAR; INTRAVENOUS at 21:52

## 2018-08-22 RX ADMIN — NYSTATIN 1 APPLICATION: 100000 POWDER TOPICAL at 16:57

## 2018-08-22 RX ADMIN — DIVALPROEX SODIUM 500 MG: 500 TABLET, EXTENDED RELEASE ORAL at 21:52

## 2018-08-22 NOTE — CASE MANAGEMENT
Continued Stay Review    Date: 8/22/18    Vital Signs: /62   Pulse 71   Temp 97 6 °F (36 4 °C) (Temporal)   Resp 18   Ht 5' 5" (1 651 m)   Wt 116 kg (256 lb 6 3 oz)   SpO2 94%   BMI 42 67 kg/m²     Medications:   Scheduled Meds:   Current Facility-Administered Medications:  acetaminophen 650 mg Oral Q6H PRN Deanna [place], CRNP   albuterol 2 5 mg Nebulization Q4H PRN Deyanira Zhou, CRNP   atorvastatin 10 mg Oral Daily Ronita Code, PA-C   dextran 70-hypromellose 1 drop Both Eyes BID Deyanira Zhou, CRNP   dextrose 25 mL Intravenous PRN Ronita Code, PA-C   divalproex sodium 250 mg Oral Daily Ronita Code, PA-C   divalproex sodium 500 mg Oral HS Ronita Code, PA-C   furosemide 20 mg Intravenous Q12H Deanna [place], CRNP   levothyroxine 137 mcg Oral Early Morning Ronita Code, PA-C   nystatin 1 application Topical TID Ronita Code, PA-C   rivaroxaban 15 mg Oral Daily With Breakfast Deyanira Zhou, CRNP   senna-docusate sodium 1 tablet Oral BID Deyanira Zhou, CRNP   tamsulosin 0 8 mg Oral HS Deyanira Zhou, CRNP   thiamine 100 mg Oral Daily Ronita Code, PA-C     Continuous Infusions:    PRN Meds:   acetaminophen    albuterol    dextrose    Abnormal Labs/Diagnostic Results:     Age/Sex: 70 y o  female     ICU  * Volume overload   Assessment & Plan     · Lasix 40 mg IV yesterday with 3 13 liters out  Pt is neg 1 5 L for 24 hrs, -2 8 L for admission  · Continue to monitor u/o and address diuresis need daily  Hypoglycemia   Assessment & Plan     · Uncertain etiology, pt was requiring D10, which has been weaned off     · Cont to follow blood sugars  · CT scan A/P did not reveal any tumors            Bradycardia   Assessment & Plan     · Cont to hold amiodarone due to bradycardia, can resume once non bradycardic  · Asymptomatic, less episodes have been noted     Toxic metabolic encephalopathy   Assessment & Plan     · possibly sepsis related, though this is acute on chronic by history > improved from admission : persistent  · TSH, depakote, levels wnl  · depakote and Abilify have been held w/o significant improvement  · Consider MRI of brain      Thrombocytopenia (HCC)   Assessment & Plan     · acute on chronic thrombocytopenia > chronic component ? Related to possible malignancy  · no active bleeding  · Trend platelets for improvement currently 58, seems to run in 100's baseline  Bilateral pleural effusion   Assessment & Plan     · uncertain cause at this time  · questionable parapneumonic versus possibly related to processes for which patient is being worked up as an outpatient ? Possible metastatic cancer lesions  · diuresed with 40 mg IV Lasix again yesterday with good response      · Will continue diuresis today        Disposition: Continue Stepdown

## 2018-08-22 NOTE — PHYSICAL THERAPY NOTE
PT TREATMENT     08/22/18 1450   Pain Assessment   Pain Assessment Tavarez-Baker FACES   Tavarez-Baker FACES Pain Rating 2   Pain Location Leg   Pain Orientation Bilateral   Pain Onset (with mobility and ROM)   Restrictions/Precautions   Other Precautions Chair Alarm; Bed Alarm; Fall Risk;Multiple lines   General   Chart Reviewed Yes   Family/Caregiver Present No   Cognition   Overall Cognitive Status Impaired   Arousal/Participation Cooperative   Following Commands Follows one step commands with increased time or repetition   Subjective   Subjective pt needs suction   Bed Mobility   Rolling R 1  Dependent   Additional items Assist x 2   Rolling L 1  Dependent   Additional items Assist x 2   Transfers   Additional Comments transferred chair to bed via lift with RN  Pt  is speaking, however keeps eyes closed 80% of time   Activity Tolerance   Activity Tolerance Treatment limited secondary to medical complications (Comment)  (little ability to participate)   Exercises   Heelslides Supine;10 reps;Bilateral   Ankle Pumps Supine;10 reps;Bilateral  (minimal ROM; contractures)   Assessment   Prognosis Fair   Problem List Decreased strength;Decreased range of motion;Decreased endurance; Impaired balance;Decreased mobility; Decreased coordination;Decreased cognition; Impaired judgement;Decreased safety awareness   Assessment Pt   is extremely limited at abilities 2/2 medical history  Pt  seen for ther ex in chair and some once RN entered to transfer pt back to bed  Pt has achieved OOB goal for sitting time  Will continue to attempt sitting at EOB goal as tolerated   Cont  as per plan  Goals   Patient Goals none stated 2/2 mentation   Treatment Day 1   Plan   Treatment/Interventions Functional transfer training; Therapeutic exercise; Endurance training;Cognitive reorientation;Patient/family training;Bed mobility   Progress Slow progress, medical status limitations   Recommendation   Recommendation (return to SNF )   Licensure [address] Number  [facility]   Taz Peraza PT  [id number]

## 2018-08-22 NOTE — SPEECH THERAPY NOTE
Speech Language/Pathology    Speech/Language Pathology Progress Note    Patient Name: Luis Fernando FERNANDESYVZ'Q Date: 8/22/2018    Subjective:  Patient was being fed lunch tray by RN upon my arrival- RN reports continued need for oral suctioning intermittently and that patient has expressed concern re: eating too much  Patient expressed "sometimes I have to cough when I eat" during po today  She continues with lethargy but slightly improved today  She reports she is happy with current puree  RN reports patient able to swallow pills whole without s/s of aspiration  Objective:  Patient was fed pureed meat and carrots as well as sherbet and thin liquids via straw sip  Bolus formation and transfer were slow yet functional with no stasis noted  No loss of bolus posteriorly suspected today  Swallowing initiation timing was variable today ranging from prompts to mildly delayed  Laryngeal rise was palpated and judged to be mildly reduced  Delayed coughing was noted intermittently with puree today- ?able pharyngeal residue post swallow- patient did appear to benefit from use of liquid wash  Patient continues with subsequent expectoration of mucus to the level of the oral cavity with coughing episodes with need for oral suctioning  Patient was noted with wet vocal quality at times with puree- she cleared given min verbal cues to do so  Wet vocal quality was not observed with thin liquids today  No additional coughing, throat clearing, change in vocal quality or respiratory status noted today  SPO2 remained WFL throughout intake  Assessment:  Patient continues to present with mild-moderate oropharyngeal dysphagia with increased risk of aspiration with rapid rate/vol of intake- she continues to benefit from use of liquid wash and feeding assistance       Plan/Recommendations:  Continue puree diet with thin liquids  meds as per patient/MD  Aspiration precautions    Feeding assist with the following strategies:  upright posture, only feed when alert, slow rate of feeding, small bites/sips and alternating bites and sips    GENNARO Vargas S , [address] [facility]  Speech Language Pathologist   [id number]

## 2018-08-22 NOTE — PROGRESS NOTES
Discussion with pt's sister regarding baseline functional status  Patient's sister states over the last year patient has been slowly declining more acutely over the last few months with regard to her memory and functional status  Patient's sister confirms that up to a year ago patient was able to walk with a walker and now she is barely able to transfer and is mostly wheelchair bound

## 2018-08-22 NOTE — PROGRESS NOTES
Transfer Note - ICU/Stepdown Transfer to [facility]/MS tele   Taryn Mccray 70 y o  female MRN: [id number]  [address]   Unit/Bed#: ICU 08 Encounter: [id number]    Code Status: Level 3 - DNAR and DNI    Reason for ICU/Stepdown admission:  Acute pulmonary edema requiring BiPAP    Active problems: Principal Problem:    Volume overload  Active Problems:    Hypoglycemia    Bradycardia    Toxic metabolic encephalopathy    Thrombocytopenia (HCC)    Bilateral pleural effusion    Subclinical hypothyroidism    Obstructive sleep apnea    Manic depressive disorder (HCC)    Schizophrenia (HCC)    Atrial fibrillation (HCC)    Multiple sclerosis (HCC)    Seizure disorder ([facility] )    Essential hypertension    Mixed hyperlipidemia  Resolved Problems:    HCAP (healthcare-associated pneumonia)    Acute respiratory failure with hypoxia and hypercapnia (HCC)    Sepsis ([facility] )    Acute pulmonary edema (HCC)    Leukopenia    Hypothermia      * Volume overload   Assessment & Plan    · Lasix 40 mg IV yesterday with 3 13 liters out  Pt is neg 1 5 L for 24 hrs, -2 8 L for admission  · Continue to monitor u/o and address diuresis need daily  HCAP (healthcare-associated pneumonia)resolved as of 8/21/2018   Assessment & Plan    · Antibx stopped, felt to be overload and not PNA        Hypoglycemia   Assessment & Plan    · Uncertain etiology, pt was requiring D10, which has been weaned off     · Cont to follow blood sugars  · CT scan A/P did not reveal any tumors        Acute respiratory failure with hypoxia and hypercapnia (HCC)resolved as of 8/20/2018   Assessment & Plan    · improved oxygen saturation and respiratory acidosis with BiPAP  · off BiPAP this morning, tolerating NC  · Wean O2 as tolerated        Bradycardia   Assessment & Plan    · Cont to hold amiodarone due to bradycardia, can resume once non bradycardic  · Asymptomatic, less episodes have been noted        Sepsis (HCC)resolved as of 8/21/2018 Assessment & Plan    · secondary to probable hospital-acquired pneumonia, suspect Gram-negative, covering for both pneumococcal and MRSA pneumonia        Toxic metabolic encephalopathy   Assessment & Plan    · possibly sepsis related, though this is acute on chronic by history > improved from admission : persistent  · TSH, depakote, levels wnl  · depakote and Abilify have been held w/o significant improvement  · Consider MRI of brain         Acute pulmonary edema (HCC)resolved as of 8/20/2018   Assessment & Plan    · possible heart failure was given 40 mg Lasix in ED and nitroglycerin with improvement of hypoxia  · Check echocardiogram  · trend urine output  · BiPAP no off, will wean O2 as tolerated        Hypothermiaresolved as of 8/21/2018   Assessment & Plan    · Unclear if related to hypothyroid or sepsis  · initiated passive rewarming measures with Greer Alcantara, temp improved this morning        Leukopeniaresolved as of 8/21/2018   Assessment & Plan    · likely sepsis related  · Cont antibx  · F/u cultures        Thrombocytopenia (HCC)   Assessment & Plan    · acute on chronic thrombocytopenia > chronic component ? Related to possible malignancy  · no active bleeding  · Trend platelets for improvement currently 58, seems to run in 100's baseline  Bilateral pleural effusion   Assessment & Plan    · uncertain cause at this time  · questionable parapneumonic versus possibly related to processes for which patient is being worked up as an outpatient ? Possible metastatic cancer lesions  · diuresed with 40 mg IV Lasix again yesterday with good response  · Will continue diuresis today           Subclinical hypothyroidism   Assessment & Plan    · Cont Levothyroxine  · TSH elevated @ 7 > T4 > 1 4 wnl        Obstructive sleep apnea   Assessment & Plan    · Use Bipap at bedtime        Schizophrenia ([facility] [facility] )   Assessment & Plan    · Chronically on abilify and depakote        Manic depressive disorder ([facility] [facility] ) Assessment & Plan    · Cont Depakote   · Abilify currently held        Atrial fibrillation (HCC)   Assessment & Plan    · Cont Xarelto  · Controlled rate  · Amiodarone on hold secondary to bradycardia  Multiple sclerosis ([facility] )   Assessment & Plan    · patient has a history of multiple sclerosis  · not actively treated at this time  · Supportive care          Mixed hyperlipidemia   Assessment & Plan    · Restart home statin        Essential hypertension   Assessment & Plan    · Hold Norvasc for now, restart when BP's increased        Seizure disorder [facility])   Assessment & Plan    · Unclear history of possible Seizure disorder with history of "convulsions" in pt chart  · Subtherapeutic depakote levels  · Pt meds have been held due to poor mentation and ability to swallow            Consultants:   · Neurology    History of Present Illness/Summary of clinical course:   65-year-old female who presented to [address] [place] [address] Emergency Department on August 18, 2018 at approximately 7 o'clock in the evening with acute pulmonary edema requiring BiPAP  She responded well to IV Lasix and has been adequately diuresed  She was transitioned from BiPAP to nasal cannula and then to room air  Patient was noted to have some bradycardic episodes likely secondary to her obstructive sleep apnea  Her amiodarone has been held  She should continue to use her BiPAP while in the hospital   It is noted that she is not compliant with CPAP at home  Patient normally resides in a skilled nursing facility  She has a past medical history of multiple sclerosis, physical disability, seizure disorder, hypertension, hypothyroidism, psychiatric disorder with history of both manic and depressive bipolar symptoms, thrombocytopenia, history of atrial fibrillation on Eliquis, as well as a history of a CVA with mild left hemiparesis  She also has history of obstructive sleep apnea and is noncompliant with CPAP at home    She has been seeing   Ferny Wiggins for continued workup for possible metastatic lesions to her spine and breast   She is scheduled to follow up on August 29, 2018  Jules Crandall Per discussion with sister she has noted that the patient has been slowly declining over the past year with regards to her mental status as well as her functional ability, and progressively over the last few months  Per records obtained from [address] [place] [address],[facility] Neurology, pt was last seen 7/20/17 for multiple neurological complaints  Pt was started on Sinemet and c/o being dizzy and foggy, so this was discontinued  It was noted that her symptoms at this time were consistent with parkinsonism, although not clear as to whether it was idiopathic versus secondary to multiple sclerosis versus some other cause  Neurology is been consulted for further evaluation  Please refer to today's progress note for further clinical details  Recent or scheduled procedures: NA    Outstanding/pending diagnostics: NA       Mobilization Plan: As tolerated pt/ot consulted    Nutrition Plan:  Dysphagia level 1 pureed with thin liquids    Discharge Plan:  Pending future hospital course    [  ] Family aware of transfer out of critical care: yes     Spoke with Dr Arash Hale regarding transfer @ [phone]  Patient accepted to their service      HAI Jesus

## 2018-08-22 NOTE — PROGRESS NOTES
Daily Progress Note - Critical Care/ Stepdown   Gus Chavira 70 y o  female MRN: [id number]  Unit/Bed#: ICU 08 Encounter: [id number]    ______________________________________________________________________  Assessment:   Principal Problem:    Volume overload  Active Problems:    Hypoglycemia    Bradycardia    Toxic metabolic encephalopathy    Thrombocytopenia (HCC)    Bilateral pleural effusion    Subclinical hypothyroidism    Obstructive sleep apnea    Manic depressive disorder (HCC)    Schizophrenia (HCC)    Atrial fibrillation (HCC)    Multiple sclerosis (HCC)    Seizure disorder (HCC)    Essential hypertension    Mixed hyperlipidemia  Resolved Problems:    HCAP (healthcare-associated pneumonia)    Acute respiratory failure with hypoxia and hypercapnia (HCC)    Sepsis (HCC)    Acute pulmonary edema (HCC)    Leukopenia    Hypothermia      * Volume overload   Assessment & Plan    · Lasix 40 mg IV yesterday with 3 13 liters out  Pt is neg 1 5 L for 24 hrs, -2 8 L for admission  · Continue to monitor u/o and address diuresis need daily  HCAP (healthcare-associated pneumonia)resolved as of 8/21/2018   Assessment & Plan    · Antibx stopped, felt to be overload and not PNA        Hypoglycemia   Assessment & Plan    · Uncertain etiology, pt was requiring D10, which has been weaned off     · Cont to follow blood sugars  · CT scan A/P did not reveal any tumors        Acute respiratory failure with hypoxia and hypercapnia (HCC)resolved as of 8/20/2018   Assessment & Plan    · improved oxygen saturation and respiratory acidosis with BiPAP  · off BiPAP this morning, tolerating NC  · Wean O2 as tolerated        Bradycardia   Assessment & Plan    · Cont to hold amiodarone due to bradycardia, can resume once non bradycardic  · Asymptomatic, less episodes have been noted        Sepsis (HCC)resolved as of 8/21/2018   Assessment & Plan    · secondary to probable hospital-acquired pneumonia, suspect Gram-negative, covering for both pneumococcal and MRSA pneumonia        Toxic metabolic encephalopathy   Assessment & Plan    · possibly sepsis related, though this is acute on chronic by history > improved from admission : persistent  · TSH, depakote, levels wnl  · depakote and Abilify have been held w/o significant improvement  · Consider MRI of brain         Acute pulmonary edema (HCC)resolved as of 8/20/2018   Assessment & Plan    · possible heart failure was given 40 mg Lasix in ED and nitroglycerin with improvement of hypoxia  · Check echocardiogram  · trend urine output  · BiPAP no off, will wean O2 as tolerated        Hypothermiaresolved as of 8/21/2018   Assessment & Plan    · Unclear if related to hypothyroid or sepsis  · initiated passive rewarming measures with Greer Hugger, temp improved this morning        Leukopeniaresolved as of 8/21/2018   Assessment & Plan    · likely sepsis related  · Cont antibx  · F/u cultures        Thrombocytopenia (HCC)   Assessment & Plan    · acute on chronic thrombocytopenia > chronic component ? Related to possible malignancy  · no active bleeding  · Trend platelets for improvement currently 58, seems to run in 100's baseline  Bilateral pleural effusion   Assessment & Plan    · uncertain cause at this time  · questionable parapneumonic versus possibly related to processes for which patient is being worked up as an outpatient ? Possible metastatic cancer lesions  · diuresed with 40 mg IV Lasix again yesterday with good response  · Will continue diuresis today           Subclinical hypothyroidism   Assessment & Plan    · Cont Levothyroxine  · TSH elevated @ 7 > T4 > 1 4 wnl        Obstructive sleep apnea   Assessment & Plan    · Use Bipap at bedtime        Schizophrenia ([facility] [address] )   Assessment & Plan    · Chronically on abilify and depakote        Manic depressive disorder ([facility] [address] )   Assessment & Plan    · Cont Depakote   · Abilify currently held        Atrial fibrillation ([facility] [address] ) Assessment & Plan    · Cont Xarelto  · Controlled rate  · Amiodarone on hold secondary to bradycardia  Multiple sclerosis ([address] )   Assessment & Plan    · patient has a history of multiple sclerosis  · not actively treated at this time  · Supportive care          Mixed hyperlipidemia   Assessment & Plan    · Restart home statin        Essential hypertension   Assessment & Plan    · Hold Norvasc for now, restart when BP's increased        Seizure disorder ([address] )   Assessment & Plan    · Unclear history of possible Seizure disorder with history of "convulsions" in pt chart  · Subtherapeutic depakote levels  · Pt meds have been held due to poor mentation and ability to swallow            Plan:    Neuro:   · Delirium: CAM ICU positive no   · Pain controlled with:  tylenol  · Pain score: Moderate  · Regulate sleep/wake cycle    CV:   · Rhythm: Atrial Fibrillation  · Follow rhythm on telemetry    Pulm:   · Encourage incentive spirometer coughing and deep breathing  · Wean supplemental oxygen for O2 saturation greater than 92%      GI:   · Nutrition/diet plan:  Dysphagia 1 pureed diet with thin liquids  · Stress ulcer prophylaxis: No prophylaxis needed  · Bowel regimen: [place]  · Last BM:  Yesterday small soft and brown    FEN:   · Fluid/Diuretic plan: Needs diuresis lasix IV  · Goal 24 hour fluid balance:  Net negative  · Electrolytes repleted: yes  · Goal: K >4 0, Mag >2 0, and Phos >3 0    :   · Indwelling Arriola present: yes   · Urinary catheter still needed for Strict I and O in a critically ill patient  ID:   · Trend temps and WBC count    Heme:   · Hemoglobin stable at 10 7  · Trend hgb and plts    Endo:   · Stable off of D10    · Glycemic control plan: N/A    Msk/Skin:  · Mobility goal:  As tolerated  · PT consult: yes  · OT consult: yes  · Frequent turning and off-loading    Family:  · Family updated within 24 hours: yes   · Family meeting planned today: no     VTE Prophylaxis:  · Pharmacologic Prophylaxis: Xarelto  · Mechanical Prophylaxis: sequential compression device    Disposition: Continue Stepdown    Code Status: Level 3 - DNAR and DNI    Counseling / Coordination of Care  Total time spent today 35 minutes  Greater than 50% of total time was spent with the patient and / or family counseling and / or coordination of care  A description of the counseling / coordination of care: As discussed above and with care team  ______________________________________________________________________    HPI/24hr events:  Patient is alert oriented x3 this morning  She was examined lying in bed in no apparent distress  She complains of generalized pain throughout her extremities  ______________________________________________________________________    Physical Exam:   Physical Exam   Constitutional: She is oriented to person, place, and time  She appears well-developed and well-nourished  She is cooperative  Nasal cannula in place  HENT:   Head: Normocephalic and atraumatic  Mouth/Throat: Oropharynx is clear and moist and mucous membranes are normal    Eyes: Pupils are equal, round, and reactive to light  Neck: Neck supple  Cardiovascular: Intact distal pulses  A regularly irregular rhythm present  Bradycardia present  Exam reveals distant heart sounds  Pulmonary/Chest: Effort normal  She has decreased breath sounds  Abdominal: Soft  There is no tenderness  Genitourinary:   Genitourinary Comments: Arriola catheter intact   Musculoskeletal:   Generalized weakness and deconditioning  Neurological: She is alert and oriented to person, place, and time  She displays atrophy  Nursing note and vitals reviewed        ______________________________________________________________________  Vitals:    08/22/18 0000 08/22/18 0400 08/22/18 0600 08/22/18 0700   BP: 122/58 130/60 124/57    BP Location:       Pulse: 64 63 66    Resp: 12 12 15    Temp: (!) 97 °F (36 1 °C) 98 4 °F (36 9 °C)  97 8 °F (36 6 °C) TempSrc: Temporal Temporal  Temporal   SpO2: 96% 96% 95%    Weight:   116 kg (256 lb 6 3 oz)    Height:                  Temperature:   Temp (24hrs), Av 6 °F (36 4 °C), Min:97 °F (36 1 °C), Max:98 4 °F (36 9 °C)    Current Temperature: 97 8 °F (36 6 °C)    Weights:   IBW: 57 kg    Body mass index is 42 67 kg/m²  Weight (last 2 days)     Date/Time   Weight    18 0600  116 (256 4)    18 0548  119 (262 35)              Hemodynamic Monitoring:  N/A       Non-Invasive/Invasive Ventilation Settings:  Respiratory    Lab Data (Last 4 hours)    None         O2/Vent Data (Last 4 hours)    None              No results found for: PHART, GLI0GZF, PO2ART, LNF8TUR, D4TPCGAU, BEART, SOURCE  SpO2: SpO2: 97 %    Intake and Outputs:  I/O       701 -  07 -  07 -  0700    P  O  780 510     I V  (mL/kg) 30 (0 3) 1070 8 (9 2)     IV Piggyback 200      Total Intake(mL/kg) 1010 (8 5) 1580 8 (13 6)     Urine (mL/kg/hr) 2850 (1) 3130 (1 1)     Total Output 2850 3130      Net -1840 -1549 2             Unmeasured Stool Occurrence 1 x 4 x           Nutrition:        Diet Orders            Start     Ordered    18 1152  Diet Dysphagia/Modified Consistency; Dysphagia 1-Pureed; Thin Liquid  Diet effective now     Question Answer Comment   Diet Type Dysphagia/Modified Consistency    Dysphagia/Modified Consistency Dysphagia 1-Pureed    Liquid Modifier Thin Liquid    RD to adjust diet per protocol?  No        18 1151    18 0803  Room Service  Once     Question:  Type of Service  Answer:  Room Service - Appropriate with Assistance    18 0802          Labs:     Results from last 7 days  Lab Units 18  0559 18  0617 18  0519   WBC Thousand/uL 5 91 5 81 3 58*   HEMOGLOBIN g/dL 10 7* 10 4* 10 6*   HEMATOCRIT % 33 9* 34 1* 35 1   PLATELETS Thousands/uL 58* 52* 52*   NEUTROS PCT % 65 66 54   MONOS PCT % 17* 15* 18*       Results from last 7 days  Lab Units 08/22/18  0559 08/21/18  0617 08/20/18  0519 08/19/18  0503 08/18/18 1915   SODIUM mmol/L 145 145 147* 149* 148*   POTASSIUM mmol/L 3 8 4 2 4 7 4 9 5 3   CHLORIDE mmol/L 108 110* 114* 114* 112*   CO2 mmol/L 28 27 26 25 27   BUN mg/dL 31* 38* 41* 46* 46*   CREATININE mg/dL 1 76* 2 03* 1 96* 1 68* 1 76*   CALCIUM mg/dL 9 3 8 9 8 8 8 8 9 6   TOTAL PROTEIN g/dL  --   --   --  6 5 7 5   BILIRUBIN TOTAL mg/dL  --   --   --  0 60 0 70   ALK PHOS U/L  --   --   --  111 141*   ALT U/L  --   --   --  27 31   AST U/L  --   --   --  21 26   GLUCOSE RANDOM mg/dL 80 88 83 49* 78       Results from last 7 days  Lab Units 08/22/18  0559 08/21/18  0617 08/20/18  0519   MAGNESIUM mg/dL 1 8 1 9 2 2     Lab Results   Component Value Date    PHOS 3 2 08/22/2018    PHOS 3 7 08/20/2018    PHOS 3 8 08/19/2018        Results from last 7 days  Lab Units 08/18/18 1915   INR  1 14   PTT seconds 39*       0  Lab Value Date/Time   TROPONINI <0 02 08/18/2018 1915       Results from last 7 days  Lab Units 08/18/18 1915   LACTIC ACID mmol/L 0 9     ABG:  Lab Results   Component Value Date    PHART 7 320 (L) 08/19/2018    QOG1QAC 46 9 (H) 08/19/2018    PO2ART 76 0 08/19/2018    NVS3BOJ 23 6 08/19/2018    BEART -2 6 08/19/2018    SOURCE Radial, Right 08/19/2018       Imaging:   XR chest portable ICU   Final Result      Mild improvement in congestive changes with bibasilar densities and pleural effusions  Workstation performed: [id number]         CT chest abdomen pelvis wo contrast   Final Result         1  Bilateral pleural effusions, right greater than left, with lower lobe compressive atelectasis  Scattered groundglass infiltrates likely infectious or inflammatory  2   Trace ascites with mild anasarca in the lower abdominal wall and pelvis  3   Constipation                 Workstation performed: [id number]         XR chest portable ICU   Final Result      Bilateral pleural effusions with bibasilar airspace disease and pulmonary congestion  There has been no interval improvement            Workstation performed: [id number]         XR chest portable ICU   Final Result      No significant interval change since 2018            Workstation performed: [id number]         CT head without contrast   Final Result      No acute intracranial abnormality  Microangiopathic changes  Workstation performed: [id number]         X-ray chest 1 view portable   Final Result      Prominent central vasculature and bilateral airspace disease likely on the basis of pulmonary edema  Small bibasilar pleural effusions  Workstation performed: [id number]            I have personally reviewed pertinent reports  ECHO:   Study date:  20-Aug-2018     Patient: Sherita Boggs  MR number: [id number]  Account number: [de-identified]  : 1950  Age: 70 years  Gender: Female  Status: Inpatient  Location: Bedside  Height: 65 in  Weight: 258 5 lb  BP: 120/ 78 mmHg     Indications: Heart Failure     Diagnoses: I50 9 - Heart failure, unspecified     Sonographer:  PAOLA Coronado  Primary Physician:  Gloria Saavedra DO  Referring Physician:  Antonieta Barry PA-C  Group:  [facility]  Interpreting Physician:  Wendi Loera DO     SUMMARY     LEFT VENTRICLE:  Systolic function was at the lower limits of normal by Teichholz  Ejection fraction was estimated to be 51 %  There were no regional wall motion abnormalities  There was mild concentric hypertrophy  There was no evidence of elevated ventricular filling pressure by Doppler parameters      MITRAL VALVE:  There was mild regurgitation      AORTIC VALVE:  There was no evidence for stenosis  There was mild regurgitation      TRICUSPID VALVE:  There was mild to moderate regurgitation    Pulmonary artery systolic pressure was within the normal range      HISTORY: PRIOR HISTORY: HTN, Hyperlipidemia, CHF, Bipolar, Stroke, Atrial Fibrillation     PROCEDURE: The procedure was performed at the bedside  This was a routine study  The transthoracic approach was used  The study included complete 2D imaging, M-mode, complete spectral Doppler, and color Doppler  The heart rate was 60 bpm,  at the start of the study  Images were not obtained from the suprasternal notch acoustic windows  Echocardiographic views were limited due to restricted patient mobility and poor acoustic window availability  This was a technically  difficult study      LEFT VENTRICLE: Size was normal  Systolic function was at the lower limits of normal by Teichholz  Ejection fraction was estimated to be 51 %  There were no regional wall motion abnormalities  There was mild concentric hypertrophy  DOPPLER: There was no evidence of elevated ventricular filling pressure by Doppler parameters      RIGHT VENTRICLE: The size was normal  Systolic function was normal  DOPPLER: Systolic pressure was within the normal range      LEFT ATRIUM: Size was normal  No thrombus was identified      RIGHT ATRIUM: Size was normal      MITRAL VALVE: Valve structure was normal  There was normal leaflet separation  No echocardiographic evidence for prolapse  DOPPLER: The transmitral velocity was within the normal range  There was no evidence for stenosis  There was mild  regurgitation      AORTIC VALVE: The valve was trileaflet  Leaflets exhibited normal thickness, normal cuspal separation, and sclerosis  DOPPLER: Transaortic velocity was within the normal range  There was no evidence for stenosis  There was mild  regurgitation      TRICUSPID VALVE: The valve structure was normal  There was normal leaflet separation  DOPPLER: The transtricuspid velocity was within the normal range  There was mild to moderate regurgitation  Pulmonary artery systolic pressure was within  the normal range  Estimated peak PA pressure was 34 mmHg      PULMONIC VALVE: Leaflets exhibited normal thickness, no calcification, and normal cuspal separation  DOPPLER: The transpulmonic velocity was within the normal range  There was no regurgitation      PERICARDIUM: There was no thickening  There was no pericardial effusion      AORTA: The root exhibited normal size      PULMONARY ARTERY: The size was normal  The morphology appeared normal      SYSTEM MEASUREMENT TABLES     2D mode  AoR Diam 2D: 2 9 cm  LA Diam (2D): 3 8 cm  LA/Ao (2D): 1 31  FS (2D Teich): 25 7 %  IVSd (2D): 1 21 cm  LVDEV: 91 5 cm³  LVESV: 45 1 cm³  LVIDd(2D): 4 48 cm  LVISd (2D): 3 33 cm  LVPWd (2D): 1 17 cm  SV (Teich): 46 4 cm³     Apical four chamber  LVEF A4C: 51 %     Unspecified Scan Mode  MV Peak E Cristian  Mean: 1140 mm/s  MVA (PHT): 4 31 cm squared  PHT: 51 ms  Max P mm[Hg]  V Max: 2780 mm/s  Vmax: 2600 mm/s  RA Area: 24 8 cm squared  RA Volume: 80 6 cm³  TAPSE: 2 1 cm     [facility]     Prepared and electronically signed by  Kayleen Lagos DO  Signed 21-Aug-2018 09:23:46      EKG:  Atrial fibrillation with a rate in a 60s    Micro:  Lab Results   Component Value Date    BLOODCX No Growth at 72 hrs  2018    BLOODCX No Growth at 72 hrs  2018    URINECX 50,000-59,000 cfu/ml Morganella morganii (A) 2018    URINECX >100,000 cfu/ml Mixed Contaminants X6 2016    URINECX >100,000 cfu/ml Pseudomonas aeruginosa 2016       Allergies:    Allergies   Allergen Reactions    Ciprocinonide [Fluocinolone] Itching    Darvon [Propoxyphene] Itching    Keflex [Cephalexin] Rash    Lithium Itching    Penicillins Itching    Sulfa Antibiotics Hives    Zithromax [Azithromycin] Rash    Ethylenediamine Tetra-Acetate [Edetic Acid] Itching     Medications:   Scheduled Meds:  Current Facility-Administered Medications:  albuterol 2 5 mg Nebulization Q4H PRN HAI Rankin   atorvastatin 10 mg Oral Daily Rosalind Kothari PA-C   dextran 70-hypromellose 1 drop Both Eyes BID HAI Rankin   dextrose 25 mL Intravenous PRN Adan Bautista PA-C   divalproex sodium 250 mg Oral Daily Adan Bautista PA-C   divalproex sodium 500 mg Oral HS Adan Bautista PA-C   levothyroxine 137 mcg Oral Early Morning Adan Bautista PA-C   nystatin 1 application Topical TID Adan Bautista PA-C   rivaroxaban 15 mg Oral Daily With Breakfast Lodema [place], CRNP   senna-docusate sodium 1 tablet Oral BID Lodema [place], CRNP   tamsulosin 0 8 mg Oral HS Lodema [place], CRNP   thiamine 100 mg Oral Daily Adan Bautista PA-C     Continuous Infusions:   PRN Meds:    albuterol 2 5 mg Q4H PRN   dextrose 25 mL PRN       Invasive lines and devices:   Invasive Devices     Peripheral Intravenous Line            Peripheral IV 08/18/18 Left Antecubital 3 days    Peripheral IV 08/18/18 Left Forearm 3 days          Drain            Urethral Catheter Non-latex 16 Fr  3 days                   SIGNATURE: HAI Greene  DATE: August 22, 2018

## 2018-08-22 NOTE — OCCUPATIONAL THERAPY NOTE
OT TREATMENT       08/22/18 1440   Restrictions/Precautions   Other Precautions Chair Alarm; Bed Alarm;Multiple lines;Telemetry;O2;Fall Risk   Pain Assessment   Pain Assessment No/denies pain   Pain Score No Pain   ADL   Eating Assistance 2  Maximal Assistance   Eating Deficit Setup;Verbal cueing;Supervision/safety; Increased time to complete;Bringing food to mouth assist;Beverage management   Eating Comments Upon therapist arrival in room pt reprots she is thirsty  Tray set up making it difficult for pt to access beverages, tried to hand pt the cup to give herself a dribk but pt was upable to open hand to hold cup, or have the strength to maintain grasp on cup or bring cup to mouth to sip  Pt requires max A with max VCs to complete task of having a drink  Encouraged pt to try to move UE arm much as possible throughotu day in order to improve strength and range to increase pt participation in self feeding/drinking tasks  Plan   Treatment Interventions ADL retraining;UE strengthening/ROM; Patient/family training;Equipment evaluation/education; Fine motor coordination activities; Energy conservation   Goal Expiration Date 09/03/18   Treatment Day (1)   OT Frequency 2-3x/wk   Recommendation   OT Discharge Recommendation (return to SNF for LTC)   Licensure   [place] License Number  Destiniia Megan OTR/L [id number]

## 2018-08-23 PROBLEM — I50.43 ACUTE ON CHRONIC COMBINED SYSTOLIC AND DIASTOLIC CONGESTIVE HEART FAILURE (HCC): Status: ACTIVE | Noted: 2018-08-23

## 2018-08-23 LAB
ANION GAP SERPL CALCULATED.3IONS-SCNC: 7 MMOL/L (ref 4–13)
BASOPHILS # BLD AUTO: 0.01 THOUSANDS/ΜL (ref 0–0.1)
BASOPHILS NFR BLD AUTO: 0 % (ref 0–1)
BUN SERPL-MCNC: 29 MG/DL (ref 5–25)
CALCIUM SERPL-MCNC: 9.3 MG/DL (ref 8.3–10.1)
CHLORIDE SERPL-SCNC: 106 MMOL/L (ref 100–108)
CO2 SERPL-SCNC: 32 MMOL/L (ref 21–32)
CREAT SERPL-MCNC: 1.6 MG/DL (ref 0.6–1.3)
EOSINOPHIL # BLD AUTO: 0.07 THOUSAND/ΜL (ref 0–0.61)
EOSINOPHIL NFR BLD AUTO: 1 % (ref 0–6)
ERYTHROCYTE [DISTWIDTH] IN BLOOD BY AUTOMATED COUNT: 15.9 % (ref 11.6–15.1)
GFR SERPL CREATININE-BSD FRML MDRD: 33 ML/MIN/1.73SQ M
GLUCOSE SERPL-MCNC: 82 MG/DL (ref 65–140)
HCT VFR BLD AUTO: 36 % (ref 34.8–46.1)
HGB BLD-MCNC: 11.2 G/DL (ref 11.5–15.4)
IMM GRANULOCYTES # BLD AUTO: 0.04 THOUSAND/UL (ref 0–0.2)
IMM GRANULOCYTES NFR BLD AUTO: 1 % (ref 0–2)
LYMPHOCYTES # BLD AUTO: 1.37 THOUSANDS/ΜL (ref 0.6–4.47)
LYMPHOCYTES NFR BLD AUTO: 22 % (ref 14–44)
MAGNESIUM SERPL-MCNC: 2.1 MG/DL (ref 1.6–2.6)
MCH RBC QN AUTO: 30.7 PG (ref 26.8–34.3)
MCHC RBC AUTO-ENTMCNC: 31.1 G/DL (ref 31.4–37.4)
MCV RBC AUTO: 99 FL (ref 82–98)
MONOCYTES # BLD AUTO: 0.95 THOUSAND/ΜL (ref 0.17–1.22)
MONOCYTES NFR BLD AUTO: 15 % (ref 4–12)
NEUTROPHILS # BLD AUTO: 3.75 THOUSANDS/ΜL (ref 1.85–7.62)
NEUTS SEG NFR BLD AUTO: 61 % (ref 43–75)
NRBC BLD AUTO-RTO: 0 /100 WBCS
PHOSPHATE SERPL-MCNC: 3.1 MG/DL (ref 2.3–4.1)
PLATELET # BLD AUTO: 81 THOUSANDS/UL (ref 149–390)
PMV BLD AUTO: 12.5 FL (ref 8.9–12.7)
POTASSIUM SERPL-SCNC: 3.7 MMOL/L (ref 3.5–5.3)
RBC # BLD AUTO: 3.65 MILLION/UL (ref 3.81–5.12)
SODIUM SERPL-SCNC: 145 MMOL/L (ref 136–145)
WBC # BLD AUTO: 6.19 THOUSAND/UL (ref 4.31–10.16)

## 2018-08-23 PROCEDURE — 94760 N-INVAS EAR/PLS OXIMETRY 1: CPT

## 2018-08-23 PROCEDURE — 83735 ASSAY OF MAGNESIUM: CPT | Performed by: NURSE PRACTITIONER

## 2018-08-23 PROCEDURE — 94669 MECHANICAL CHEST WALL OSCILL: CPT

## 2018-08-23 PROCEDURE — 84100 ASSAY OF PHOSPHORUS: CPT | Performed by: NURSE PRACTITIONER

## 2018-08-23 PROCEDURE — 99222 1ST HOSP IP/OBS MODERATE 55: CPT | Performed by: PSYCHIATRY & NEUROLOGY

## 2018-08-23 PROCEDURE — 85025 COMPLETE CBC W/AUTO DIFF WBC: CPT | Performed by: NURSE PRACTITIONER

## 2018-08-23 PROCEDURE — 99223 1ST HOSP IP/OBS HIGH 75: CPT | Performed by: INTERNAL MEDICINE

## 2018-08-23 PROCEDURE — 99233 SBSQ HOSP IP/OBS HIGH 50: CPT | Performed by: INTERNAL MEDICINE

## 2018-08-23 PROCEDURE — 80048 BASIC METABOLIC PNL TOTAL CA: CPT | Performed by: NURSE PRACTITIONER

## 2018-08-23 RX ORDER — SPIRONOLACTONE 25 MG/1
25 TABLET ORAL DAILY
Status: DISCONTINUED | OUTPATIENT
Start: 2018-08-23 | End: 2018-08-28 | Stop reason: HOSPADM

## 2018-08-23 RX ORDER — LEVOTHYROXINE SODIUM 0.15 MG/1
150 TABLET ORAL EVERY OTHER DAY
Status: DISCONTINUED | OUTPATIENT
Start: 2018-08-24 | End: 2018-08-28 | Stop reason: HOSPADM

## 2018-08-23 RX ADMIN — DIVALPROEX SODIUM 500 MG: 500 TABLET, EXTENDED RELEASE ORAL at 22:21

## 2018-08-23 RX ADMIN — TAMSULOSIN HYDROCHLORIDE 0.8 MG: 0.4 CAPSULE ORAL at 22:21

## 2018-08-23 RX ADMIN — DIVALPROEX SODIUM 250 MG: 500 TABLET, EXTENDED RELEASE ORAL at 09:12

## 2018-08-23 RX ADMIN — FUROSEMIDE 20 MG: 10 INJECTION, SOLUTION INTRAMUSCULAR; INTRAVENOUS at 09:11

## 2018-08-23 RX ADMIN — Medication 100 MG: at 09:10

## 2018-08-23 RX ADMIN — NYSTATIN 1 APPLICATION: 100000 POWDER TOPICAL at 22:22

## 2018-08-23 RX ADMIN — LEVOTHYROXINE SODIUM 137 MCG: 112 TABLET ORAL at 05:36

## 2018-08-23 RX ADMIN — SENNOSIDES AND DOCUSATE SODIUM 1 TABLET: 8.6; 5 TABLET ORAL at 22:21

## 2018-08-23 RX ADMIN — ATORVASTATIN CALCIUM 10 MG: 10 TABLET, FILM COATED ORAL at 09:10

## 2018-08-23 RX ADMIN — NYSTATIN 1 APPLICATION: 100000 POWDER TOPICAL at 09:14

## 2018-08-23 RX ADMIN — SENNOSIDES AND DOCUSATE SODIUM 1 TABLET: 8.6; 5 TABLET ORAL at 09:09

## 2018-08-23 RX ADMIN — RIVAROXABAN 15 MG: 15 TABLET, FILM COATED ORAL at 09:10

## 2018-08-23 RX ADMIN — DEXTRAN 70 AND HYPROMELLOSE 2910 1 DROP: 1; 3 SOLUTION/ DROPS OPHTHALMIC at 09:12

## 2018-08-23 RX ADMIN — SPIRONOLACTONE 25 MG: 25 TABLET, FILM COATED ORAL at 14:56

## 2018-08-23 RX ADMIN — FUROSEMIDE 20 MG: 10 INJECTION, SOLUTION INTRAMUSCULAR; INTRAVENOUS at 22:21

## 2018-08-23 NOTE — ASSESSMENT & PLAN NOTE
Patient noted to have low heart rate on telemetry and patient's amiodarone was stopped  Patient's heart rate continues to remain low-cannot rule out sick sinus syndrome  Continue Xarelto for anticoagulation

## 2018-08-23 NOTE — CONSULTS
Consultation - Cardiology   Meghna Red 70 y o  female MRN: [id number]  Unit/Bed#: ICU 08 Encounter: [id number]    Assessment/Plan     Assessment:  1  Acute combined systolic and diastolic heart failure:  Echocardiogram demonstrates ejection fraction of 50% with regurgitation of the aortic, mitral and tricuspid valve  2   Bilateral pleural effusions  3  Hypertension  4  Chronic atrial fibrillation:  Amiodarone discontinued on admission secondary to slow ventricular response and being in atrial fibrillation  5   Morbid obesity question hypoventilation syndrome:  BMI is 40 1  6  Subclinical hypothyroidism  7  Obstructive sleep apnea  8  Multiple sclerosis   9  Bipolar disorder    Plan  Patient has been admitted to the hospitalist service  1   Will continue IV diuresis with Lasix 20 mg b i d  with close monitoring of her electrolytes and I&O     2   Continue monitor telemetry  Despite not being on AV ry blockers patient's heart rate has been below 70 and atrial fibrillation  Concern for possible early sick sinus syndrome  Will continue her Xarelto 15 mg once a day  3   Patient with subclinical hypothyroidism, continue her levothyroxine  4   Will add low-dose Aldactone 25 mg once a day to hopefully enhance diuresis and lower blood pressure  5   Once patient has been diuresed would obtain Lexiscan nuclear stress test to evaluate for ischemia  History of Present Illness   Physician Requesting Consult: Fabi Bustos MD  Reason for Consult / Principal Problem:  Recurrent CHF, appears to be diastolic mediated  HPI: Meghna Red is a 70y o  year old female who presented to the emergency room from the [facility] due to hypoxemia, shortness of breath, fatigue and change in mental status  Also noted on afternoon of admission patient was having some chest discomfort with her complaints of shortness of breath    Upon presentation to the emergency room patient was found to be hypothermic with a rectal temperature of 92°  Patient was placed on a Greer Hugger, and started on IV nitroglycerin for pulmonary edema and placed also on BiPAP  Undergoing diuresis has been minimally effective, with patient having recurrent bouts of pulmonary edema  2D echocardiogram was obtained on this admission and demonstrated an ejection fraction visually estimated at 50%  Patient was noted to have mild MR and AR, and mild to moderate tricuspid valve regurgitation without elevation of her pulmonary artery pressures  Patient has been tolerating IV diuretics with improvement in her renal function  Appears patient has diuresed approximately 7 L of fluid since admission, but still remains volume overloaded  Inpatient consult to Cardiology  Consult performed by: Skyler Florez ordered by: Amber Brooke          Review of Systems   Constitutional: Positive for activity change and fatigue  Negative for appetite change and fever  Eyes: Negative for photophobia and visual disturbance  Respiratory: Positive for cough and shortness of breath  Negative for apnea and chest tightness  Patient wheelchair-bound at [address] [place] [address], minimally active   Cardiovascular: Positive for leg swelling  Negative for chest pain and palpitations  Gastrointestinal: Positive for abdominal distention  Negative for constipation, diarrhea and nausea  Endocrine: Negative for polydipsia, polyphagia and polyuria         Historical Information   Past Medical History:   Diagnosis Date    [facility]     Bipolar disorder [facility]     Disease of thyroid gland     [facility] )     afibrillation    Hyperlipidemia     Hypertension     Irregular heart beat     Multiple sclerosis (HCC)     Paralysis (HCC)     left hemiplegia    Psychiatric disorder     depression, schizophrenia    Schizophrenia ([facility] )     Stroke ([facility] )     left hemiplegia     Past Surgical History:   Procedure Laterality Date    BACK SURGERY      2    CHOLECYSTECTOMY      COLONOSCOPY N/A 1/19/2017    Procedure: COLONOSCOPY;  Surgeon: Levi Angulo MD;  Location: [facility] GI LAB; Service:     ESOPHAGOGASTRODUODENOSCOPY N/A 1/19/2017    Procedure: ESOPHAGOGASTRODUODENOSCOPY (EGD); Surgeon: Levi Angulo MD;  Location: [facility] GI LAB; Service:     EYE MUSCLE SURGERY Left     HYSTERECTOMY      TONSILLECTOMY       History   Alcohol Use No     History   Drug Use No     History   Smoking Status    Never Smoker   Smokeless Tobacco    Never Used     Family History: History reviewed  No pertinent family history  Meds/Allergies   all current active meds have been reviewed, current meds:   Current Facility-Administered Medications   Medication Dose Route Frequency    acetaminophen (TYLENOL) tablet 650 mg  650 mg Oral Q6H PRN    albuterol inhalation solution 2 5 mg  2 5 mg Nebulization Q4H PRN    atorvastatin (LIPITOR) tablet 10 mg  10 mg Oral Daily    dextran 70-hypromellose (GENTEAL TEARS) 0 1-0 3 % ophthalmic solution 1 drop  1 drop Both Eyes BID    dextrose 50 % IV solution 25 mL  25 mL Intravenous PRN    divalproex sodium (DEPAKOTE ER) 24 hr tablet 250 mg  250 mg Oral Daily    divalproex sodium (DEPAKOTE ER) 24 hr tablet 500 mg  500 mg Oral HS    furosemide (LASIX) injection 20 mg  20 mg Intravenous Q12H    levothyroxine tablet 137 mcg  137 mcg Oral Early Morning    nystatin (MYCOSTATIN) powder 1 application  1 application Topical TID    rivaroxaban (XARELTO) tablet 15 mg  15 mg Oral Daily With Breakfast    senna-docusate sodium (SENOKOT S) 8 6-50 mg per tablet 1 tablet  1 tablet Oral BID    tamsulosin (FLOMAX) capsule 0 8 mg  0 8 mg Oral HS    thiamine (VITAMIN B1) tablet 100 mg  100 mg Oral Daily    and PTA meds:   Prior to Admission Medications   Prescriptions Last Dose Informant Patient Reported? Taking?    ARIPiprazole (ABILIFY) 30 mg tablet 8/18/2018 at Unknown time Other (Specify) Yes Yes   Sig: Take 30 mg by mouth daily   Cranberry 450 MG CAPS 8/18/2018 at Unknown time Outside Facility ([address] [place] [address]) Yes Yes   Sig: Take 1 capsule by mouth daily     Dextromethorphan-Guaifenesin (ROBITUSSIN DM PO) 8/18/2018 at [address] [place] [address] ([address] [place] [address]) Yes Yes   Sig: Take 10 mL by mouth every 8 (eight) hours   Levothyroxine Sodium 137 MCG CAPS 8/18/2018 at Unknown time Outside Facility ([address] [place] [address]) Yes Yes   Sig: Take 137 mcg by mouth daily     acetaminophen (TYLENOL) 325 mg tablet 8/18/2018 at [address] [place] [address] (Specify) Yes Yes   Sig: Take 650 mg by mouth every 6 (six) hours as needed for mild pain   amLODIPine (NORVASC) 5 mg tablet 8/18/2018 at Unknown time Outside Facility ([address] [place] [address]) Yes Yes   Sig: Take 5 mg by mouth daily     amiodarone 100 mg tablet 8/18/2018 at Unknown time Outside Facility ([address] [place] [address]) Yes Yes   Sig: Take 50 mg by mouth daily     atorvastatin (LIPITOR) 10 mg tablet 8/17/2018 at Unknown time  Yes Yes   Sig: Take 10 mg by mouth daily   divalproex sodium (DEPAKOTE ER) 250 mg 24 hr tablet 8/18/2018 at Unknown time Other (Specify) Yes Yes   Sig: Take 250 mg by mouth daily   divalproex sodium (DEPAKOTE ER) 500 mg 24 hr tablet 8/17/2018 at Unknown time Outside Facility ([address] [place] [address]) Yes Yes   Sig: Take 500 mg by mouth daily at bedtime     docusate sodium (COLACE) 100 mg capsule 8/17/2018 at Unknown time Other (Specify) Yes Yes   Sig: Take 200 mg by mouth daily at bedtime   polyethylene glycol (MIRALAX) 17 g packet 8/18/2018 at Unknown time Other (Specify) Yes Yes   Sig: Take 17 g by mouth daily   polyvinyl alcohol (LIQUIFILM TEARS) 1 4 % ophthalmic solution 8/18/2018 at 0900 Outside Facility (Specify) Yes Yes   Sig: Administer 1 drop to both eyes 2 (two) times a day   pregabalin (LYRICA) 50 mg capsule 8/18/2018 at 0900  Yes Yes   Sig: Take 50 mg by mouth 2 (two) times a day   rivaroxaban (XARELTO) 15 mg tablet 8/17/2018 at 2000  Yes Yes   Sig: Take 15 mg by mouth   senna-docusate sodium (SENOKOT S) 8 6-50 mg per tablet 8/18/2018 at Unknown time  Yes Yes   Sig: Take 2 tablets by mouth daily     tamsulosin (FLOMAX) 0 4 mg 8/17/2018 at Unknown time Other (Specify) Yes Yes   Sig: Take 0 8 mg by mouth daily at bedtime   thiamine (VITAMIN B1) 100 mg tablet 8/18/2018 at Unknown time  Yes Yes   Sig: Take 100 mg by mouth daily      Facility-Administered Medications: None     Allergies   Allergen Reactions    Ciprocinonide [Fluocinolone] Itching    Darvon [Propoxyphene] Itching    Keflex [Cephalexin] Rash    Lithium Itching    Penicillins Itching    Sulfa Antibiotics Hives    Zithromax [Azithromycin] Rash    Ethylenediamine Tetra-Acetate [Edetic Acid] Itching       Objective   Vitals: Blood pressure 134/64, pulse 57, temperature 97 5 °F (36 4 °C), temperature source Temporal, resp  rate 18, height 5' 5" (1 651 m), weight 110 kg (241 lb 6 5 oz), SpO2 93 %  Orthostatic Blood Pressures      Most Recent Value   Blood Pressure  134/64 filed at 08/23/2018 0702   Patient Position - Orthostatic VS  Lying filed at 08/23/2018 4854            Intake/Output Summary (Last 24 hours) at 08/23/18 1352  Last data filed at 08/23/18 1200   Gross per 24 hour   Intake              405 ml   Output             3950 ml   Net            -3545 ml       Invasive Devices     Peripheral Intravenous Line            Long-Dwell Peripheral IV (Midline) 92/58/15 Left Cephalic less than 1 day          Drain            Urethral Catheter Non-latex 16 Fr  4 days                Physical Exam   Constitutional: She appears well-developed and well-nourished  HENT:   Head: Normocephalic and atraumatic  Eyes: Pupils are equal, round, and reactive to light  Neck: Normal range of motion  Neck supple  JVD present  No thyromegaly present  Cardiovascular: An irregular rhythm present  Bradycardia present  Pulses:       Radial pulses are 2+ on the right side, and 2+ on the left side     Plus two systolic murmur heard both left and right sternal borders   Pulmonary/Chest: Coarse crackles bilaterally 1/4   Abdominal: Soft  Bowel sounds are normal    Musculoskeletal:   Morbidly obese, +2 pedal and ankle edema bilaterally, poor skin turgor   Neurological: She is alert  Skin: Skin is warm and dry  Psychiatric:   Flat affect, patient slow to respond to questions question whether due to [address] note and vitals reviewed  Lab Results:   I have personally reviewed pertinent lab results  CBC with diff:   Results from last 7 days  Lab Units 08/23/18  0533   WBC Thousand/uL 6 19   RBC Million/uL 3 65*   HEMOGLOBIN g/dL 11 2*   HEMATOCRIT % 36 0   MCV fL 99*   MCH pg 30 7   MCHC g/dL 31 1*   RDW % 15 9*   MPV fL 12 5   PLATELETS Thousands/uL 81*     CMP:   Results from last 7 days  Lab Units 08/23/18  0533  08/19/18  0503   SODIUM mmol/L 145  < > 149*   POTASSIUM mmol/L 3 7  < > 4 9   CHLORIDE mmol/L 106  < > 114*   CO2 mmol/L 32  < > 25   ANION GAP mmol/L 7  < > 10   BUN mg/dL 29*  < > 46*   CREATININE mg/dL 1 60*  < > 1 68*   GLUCOSE RANDOM mg/dL 82  < > 49*   CALCIUM mg/dL 9 3  < > 8 8   AST U/L  --   --  21   ALT U/L  --   --  27   ALK PHOS U/L  --   --  111   TOTAL PROTEIN g/dL  --   --  6 5   BILIRUBIN TOTAL mg/dL  --   --  0 60   EGFR ml/min/1 73sq m 33  < > 31   < > = values in this interval not displayed  Imaging: I have personally reviewed pertinent reports       CHEST      INDICATION:   CHF      COMPARISON:  8/20/2018     EXAM PERFORMED/VIEWS:  XR CHEST PORTABLE ICU        FINDINGS:     Heart shadow is enlarged but unchanged from prior exam      Mild improvement in congestive changes with bibasilar densities and pleural effusions      Osseous structures appear within normal limits for patient age      IMPRESSION:     Mild improvement in congestive changes with bibasilar densities and pleural effusions            Workstation performed: [id number]    ----------------------------------------------------------------------------------------------------------------------------      EK lead EKG demonstrates atrial fibrillation with T-wave inversion in lateral leads  VTE Prophylaxis:   On Xarelto and compression stockings    Code Status: Level 3 - DNAR and DNI  Advance Directive and Living Will: Yes    Power of :    POLST:      Counseling / Coordination of Care  Total floor / unit time spent today 60 minutes  Greater than 50% of total time was spent with the patient and / or family counseling and / or coordination of care  A description of the counseling / coordination of care:  Recurrent CHF

## 2018-08-23 NOTE — ASSESSMENT & PLAN NOTE
Blood pressure is running slightly high  Patient was added on spironolactone today to enhance diuresis and also help with blood pressure

## 2018-08-23 NOTE — ASSESSMENT & PLAN NOTE
Patient is on levothyroxine 137 microgram p o  Daily    TSH elevated at 7   Will increase levothyroxine to 150 microgram twice a week

## 2018-08-23 NOTE — ASSESSMENT & PLAN NOTE
History of chronic thrombocytopenia  No evidence of active bleeding  Platelet count is slightly better

## 2018-08-23 NOTE — ASSESSMENT & PLAN NOTE
Patient's echo showed EF of 55% with mild concentric hypertrophy without any significant valvular abnormalities  The etiology of sudden pulmonary edema and CHF is not clear  Continue Lasix 20 milligram IV q 12 hours    Monitor strict I& O and daily weight  Cardiology consult appreciated  Patient might need nuclear stress test before discharge

## 2018-08-23 NOTE — PROGRESS NOTES
Progress Note - Arvis Alt 1950, 70 y o  female MRN: [id number]    Unit/Bed#: ICU 08 Encounter: [id number]    Primary Care Provider: Gertrudis Denis DO   Date and time admitted to hospital: 8/18/2018  7:01 PM        Acute on chronic combined systolic and diastolic congestive heart failure ([facility] )   Assessment & Plan    Patient's echo showed EF of 55% with mild concentric hypertrophy without any significant valvular abnormalities  The etiology of sudden pulmonary edema and CHF is not clear  Continue Lasix 20 milligram IV q 12 hours  Monitor strict I& O and daily weight  Cardiology consult appreciated  Patient might need nuclear stress test before discharge        Toxic metabolic encephalopathy   Assessment & Plan    Patient's TSH, Depakote levels were normal   Await Neurology input        Bilateral pleural effusion   Assessment & Plan    Secondary to congestive heart failure exacerbation  Continue Lasix  Monitor response        Atrial fibrillation (HCC)   Assessment & Plan    Patient noted to have low heart rate on telemetry and patient's amiodarone was stopped  Patient's heart rate continues to remain low-cannot rule out sick sinus syndrome  Continue Xarelto for anticoagulation        Multiple sclerosis ([facility] )   Assessment & Plan    Not actively treated at this time        Essential hypertension   Assessment & Plan    Blood pressure is running slightly high  Patient was added on spironolactone today to enhance diuresis and also help with blood pressure        Seizure disorder [facility])   Assessment & Plan    Patient is on Depakote 500 milligram q h s  and 250 milligrams in the morning        Schizophrenia [facility])   Assessment & Plan    Patient is on Abilify        Mixed hyperlipidemia   Assessment & Plan    Continue Lipitor        Obstructive sleep apnea   Assessment & Plan    Continue BiPAP q h s  Hypothyroidism   Assessment & Plan    Patient is on levothyroxine 137 microgram p o  Daily    TSH elevated at 7   Will increase levothyroxine to 150 microgram twice a week        Thrombocytopenia (HCC)   Assessment & Plan    History of chronic thrombocytopenia  No evidence of active bleeding  Platelet count is slightly better            VTE Pharmacologic Prophylaxis:   Pharmacologic: Rivaroxaban (Xarelto)  Mechanical VTE Prophylaxis in Place: Yes    Patient Centered Rounds: I have performed bedside rounds with nursing staff today  Discussions with Specialists or Other Care Team Provider: Dr Otilia Horton and Discussions with Family / Patient: Yes    Time Spent for Care: 45 minutes  More than 50% of total time spent on counseling and coordination of care as described above  Current Length of Stay: 5 day(s)    Current Patient Status: Inpatient   Certification Statement: The patient will continue to require additional inpatient hospital stay due to Acute exacerbation of CB    Discharge Plan: STR    Code Status: Level 3 - DNAR and DNI      Subjective: The patient denies any shortness of breath, chest pain, abdominal pain, nausea or vomiting  Objective:     Vitals:   Temp (24hrs), Av 2 °F (36 8 °C), Min:97 5 °F (36 4 °C), Max:99 2 °F (37 3 °C)    HR:  [57-84] 84  Resp:  [14-18] 18  BP: (116-140)/(56-68) 140/63  SpO2:  [93 %-100 %] 100 %  Body mass index is 40 17 kg/m²  Input and Output Summary (last 24 hours): Intake/Output Summary (Last 24 hours) at 18 1816  Last data filed at 18 1600   Gross per 24 hour   Intake              305 ml   Output             3450 ml   Net            -3145 ml       Physical Exam:     Physical Exam   Constitutional: No distress  HENT:   Head: Normocephalic and atraumatic  Nose: Nose normal    Eyes: Conjunctivae and EOM are normal  Pupils are equal, round, and reactive to light  Neck: Normal range of motion  Neck supple  No JVD present  Cardiovascular: Normal rate  Exam reveals no gallop and no friction rub  Murmur heard    Irregularly irregular Pulmonary/Chest: Effort normal  No respiratory distress  She has no wheezes  She has no rales  She exhibits no tenderness  Decreased breath sounds bilaterally at bases   Abdominal: Soft  Bowel sounds are normal  She exhibits no distension  There is no tenderness  There is no rebound and no guarding  Musculoskeletal: She exhibits edema  Neurological: She is alert  No cranial nerve deficit  Skin: Skin is warm and dry  No rash noted  Psychiatric: She has a normal mood and affect  Additional Data:     Labs:      Results from last 7 days  Lab Units 08/23/18  0533   WBC Thousand/uL 6 19   HEMOGLOBIN g/dL 11 2*   HEMATOCRIT % 36 0   PLATELETS Thousands/uL 81*   NEUTROS PCT % 61   LYMPHS PCT % 22   MONOS PCT % 15*   EOS PCT % 1       Results from last 7 days  Lab Units 08/23/18  0533  08/19/18  0503   SODIUM mmol/L 145  < > 149*   POTASSIUM mmol/L 3 7  < > 4 9   CHLORIDE mmol/L 106  < > 114*   CO2 mmol/L 32  < > 25   BUN mg/dL 29*  < > 46*   CREATININE mg/dL 1 60*  < > 1 68*   CALCIUM mg/dL 9 3  < > 8 8   TOTAL PROTEIN g/dL  --   --  6 5   BILIRUBIN TOTAL mg/dL  --   --  0 60   ALK PHOS U/L  --   --  111   ALT U/L  --   --  27   AST U/L  --   --  21   GLUCOSE RANDOM mg/dL 82  < > 49*   < > = values in this interval not displayed  Results from last 7 days  Lab Units 08/18/18 1915   INR  1 14       * I Have Reviewed All Lab Data Listed Above  * Additional Pertinent Lab Tests Reviewed: [facility] Admission Reviewed      Recent Cultures (last 7 days):       Results from last 7 days  Lab Units 08/18/18 2236 08/18/18 1951 08/18/18 1915   BLOOD CULTURE   --   --  No Growth After 4 Days  No Growth After 4 Days     URINE CULTURE   --  50,000-59,000 cfu/ml Morganella morganii*  --    LEGIONELLA URINARY ANTIGEN  Negative  --   --        Last 24 Hours Medication List:     Current Facility-Administered Medications:  acetaminophen 650 mg Oral Q6H PRN HAI Kay   albuterol 2 5 mg Nebulization Q4H PRN HAI Yousif   atorvastatin 10 mg Oral Daily Ngockiya Chan PA-C   dextran 70-hypromellose 1 drop Both Eyes BID HAI Yousif   dextrose 25 mL Intravenous PRN Ngoc Chan PA-C   divalproex sodium 250 mg Oral Daily Ngoc ANAMARIA Chan   divalproex sodium 500 mg Oral HS Ngockiya Chan PA-C   furosemide 20 mg Intravenous Q12H HAI James   levothyroxine 137 mcg Oral Early Morning Ngockiya Chan PA-C   nystatin 1 application Topical TID Ngockiya Chan PA-C   rivaroxaban 15 mg Oral Daily With Breakfast HAI Yousif   senna-docusate sodium 1 tablet Oral BID HAI Yousif   spironolactone 25 mg Oral Daily Delray GellHAI   tamsulosin 0 8 mg Oral HS HAI Yousif   thiamine 100 mg Oral Daily Ngoc Chan PA-C        Today, Patient Was Seen By: Farzaneh Mishra MD    ** Please Note: Dictation voice to text software may have been used in the creation of this document   **

## 2018-08-24 ENCOUNTER — APPOINTMENT (INPATIENT)
Dept: NEUROLOGY | Facility: HOSPITAL | Age: 68
DRG: 871 | End: 2018-08-24
Attending: PSYCHIATRY & NEUROLOGY
Payer: MEDICARE

## 2018-08-24 ENCOUNTER — APPOINTMENT (INPATIENT)
Dept: RADIOLOGY | Facility: HOSPITAL | Age: 68
DRG: 871 | End: 2018-08-24
Payer: MEDICARE

## 2018-08-24 PROBLEM — N18.30 CHRONIC KIDNEY DISEASE, STAGE 3 (HCC): Status: ACTIVE | Noted: 2018-08-24

## 2018-08-24 PROBLEM — E55.9 VITAMIN D DEFICIENCY: Status: ACTIVE | Noted: 2018-08-24

## 2018-08-24 LAB
25(OH)D3 SERPL-MCNC: 4.3 NG/ML (ref 30–100)
ANION GAP SERPL CALCULATED.3IONS-SCNC: 4 MMOL/L (ref 4–13)
BACTERIA BLD CULT: NORMAL
BACTERIA BLD CULT: NORMAL
BUN SERPL-MCNC: 30 MG/DL (ref 5–25)
CALCIUM SERPL-MCNC: 9 MG/DL (ref 8.3–10.1)
CHLORIDE SERPL-SCNC: 104 MMOL/L (ref 100–108)
CO2 SERPL-SCNC: 37 MMOL/L (ref 21–32)
CREAT SERPL-MCNC: 1.53 MG/DL (ref 0.6–1.3)
ERYTHROCYTE [DISTWIDTH] IN BLOOD BY AUTOMATED COUNT: 15.4 % (ref 11.6–15.1)
GFR SERPL CREATININE-BSD FRML MDRD: 34 ML/MIN/1.73SQ M
GLUCOSE SERPL-MCNC: 84 MG/DL (ref 65–140)
HCT VFR BLD AUTO: 33.8 % (ref 34.8–46.1)
HGB BLD-MCNC: 10.8 G/DL (ref 11.5–15.4)
MCH RBC QN AUTO: 30.7 PG (ref 26.8–34.3)
MCHC RBC AUTO-ENTMCNC: 32 G/DL (ref 31.4–37.4)
MCV RBC AUTO: 96 FL (ref 82–98)
PLATELET # BLD AUTO: 106 THOUSANDS/UL (ref 149–390)
PMV BLD AUTO: 12.6 FL (ref 8.9–12.7)
POTASSIUM SERPL-SCNC: 3.4 MMOL/L (ref 3.5–5.3)
RBC # BLD AUTO: 3.52 MILLION/UL (ref 3.81–5.12)
SODIUM SERPL-SCNC: 145 MMOL/L (ref 136–145)
VIT B12 SERPL-MCNC: 943 PG/ML (ref 100–900)
WBC # BLD AUTO: 6.81 THOUSAND/UL (ref 4.31–10.16)

## 2018-08-24 PROCEDURE — A9585 GADOBUTROL INJECTION: HCPCS | Performed by: PSYCHIATRY & NEUROLOGY

## 2018-08-24 PROCEDURE — 82306 VITAMIN D 25 HYDROXY: CPT | Performed by: PSYCHIATRY & NEUROLOGY

## 2018-08-24 PROCEDURE — 92526 ORAL FUNCTION THERAPY: CPT

## 2018-08-24 PROCEDURE — 95816 EEG AWAKE AND DROWSY: CPT

## 2018-08-24 PROCEDURE — 82607 VITAMIN B-12: CPT | Performed by: PSYCHIATRY & NEUROLOGY

## 2018-08-24 PROCEDURE — 70553 MRI BRAIN STEM W/O & W/DYE: CPT

## 2018-08-24 PROCEDURE — 94669 MECHANICAL CHEST WALL OSCILL: CPT

## 2018-08-24 PROCEDURE — 84165 PROTEIN E-PHORESIS SERUM: CPT | Performed by: PSYCHIATRY & NEUROLOGY

## 2018-08-24 PROCEDURE — 99232 SBSQ HOSP IP/OBS MODERATE 35: CPT | Performed by: INTERNAL MEDICINE

## 2018-08-24 PROCEDURE — 94760 N-INVAS EAR/PLS OXIMETRY 1: CPT

## 2018-08-24 PROCEDURE — 85027 COMPLETE CBC AUTOMATED: CPT | Performed by: INTERNAL MEDICINE

## 2018-08-24 PROCEDURE — 86376 MICROSOMAL ANTIBODY EACH: CPT | Performed by: PSYCHIATRY & NEUROLOGY

## 2018-08-24 PROCEDURE — 84165 PROTEIN E-PHORESIS SERUM: CPT | Performed by: PATHOLOGY

## 2018-08-24 PROCEDURE — 95819 EEG AWAKE AND ASLEEP: CPT | Performed by: PSYCHIATRY & NEUROLOGY

## 2018-08-24 PROCEDURE — 80048 BASIC METABOLIC PNL TOTAL CA: CPT | Performed by: INTERNAL MEDICINE

## 2018-08-24 PROCEDURE — 84166 PROTEIN E-PHORESIS/URINE/CSF: CPT | Performed by: PATHOLOGY

## 2018-08-24 RX ORDER — POTASSIUM CHLORIDE 20 MEQ/1
40 TABLET, EXTENDED RELEASE ORAL ONCE
Status: COMPLETED | OUTPATIENT
Start: 2018-08-24 | End: 2018-08-24

## 2018-08-24 RX ORDER — MELATONIN
1000 DAILY
Status: DISCONTINUED | OUTPATIENT
Start: 2018-08-25 | End: 2018-08-28 | Stop reason: HOSPADM

## 2018-08-24 RX ADMIN — SENNOSIDES AND DOCUSATE SODIUM 1 TABLET: 8.6; 5 TABLET ORAL at 21:41

## 2018-08-24 RX ADMIN — NYSTATIN 1 APPLICATION: 100000 POWDER TOPICAL at 15:05

## 2018-08-24 RX ADMIN — RIVAROXABAN 15 MG: 15 TABLET, FILM COATED ORAL at 09:00

## 2018-08-24 RX ADMIN — ATORVASTATIN CALCIUM 10 MG: 10 TABLET, FILM COATED ORAL at 09:00

## 2018-08-24 RX ADMIN — FUROSEMIDE 20 MG: 10 INJECTION, SOLUTION INTRAMUSCULAR; INTRAVENOUS at 09:00

## 2018-08-24 RX ADMIN — NYSTATIN 1 APPLICATION: 100000 POWDER TOPICAL at 09:01

## 2018-08-24 RX ADMIN — DEXTRAN 70 AND HYPROMELLOSE 2910 1 DROP: 1; 3 SOLUTION/ DROPS OPHTHALMIC at 17:35

## 2018-08-24 RX ADMIN — SENNOSIDES AND DOCUSATE SODIUM 1 TABLET: 8.6; 5 TABLET ORAL at 09:00

## 2018-08-24 RX ADMIN — SPIRONOLACTONE 25 MG: 25 TABLET, FILM COATED ORAL at 09:00

## 2018-08-24 RX ADMIN — POTASSIUM CHLORIDE 40 MEQ: 1500 TABLET, EXTENDED RELEASE ORAL at 09:02

## 2018-08-24 RX ADMIN — NYSTATIN 1 APPLICATION: 100000 POWDER TOPICAL at 21:49

## 2018-08-24 RX ADMIN — Medication 100 MG: at 09:00

## 2018-08-24 RX ADMIN — FUROSEMIDE 20 MG: 10 INJECTION, SOLUTION INTRAMUSCULAR; INTRAVENOUS at 21:41

## 2018-08-24 RX ADMIN — TAMSULOSIN HYDROCHLORIDE 0.8 MG: 0.4 CAPSULE ORAL at 21:41

## 2018-08-24 RX ADMIN — DIVALPROEX SODIUM 250 MG: 500 TABLET, EXTENDED RELEASE ORAL at 09:00

## 2018-08-24 RX ADMIN — DIVALPROEX SODIUM 500 MG: 500 TABLET, EXTENDED RELEASE ORAL at 21:41

## 2018-08-24 RX ADMIN — DEXTRAN 70 AND HYPROMELLOSE 2910 1 DROP: 1; 3 SOLUTION/ DROPS OPHTHALMIC at 09:01

## 2018-08-24 RX ADMIN — LEVOTHYROXINE SODIUM 150 MCG: 150 TABLET ORAL at 09:00

## 2018-08-24 RX ADMIN — GADOBUTROL 5 ML: 604.72 INJECTION INTRAVENOUS at 14:32

## 2018-08-24 NOTE — SPEECH THERAPY NOTE
Speech Language/Pathology    Speech/Language Pathology Progress Note    Patient Name: Mary Jane  [id number] Date: 8/24/2018    Subjective:  Patient was seen upright in bed with her lunch tray  She reports she is happy with current pureed diet with no desire for upgrade  Patient reports fair-good appetite  Objective:  Upon my arrival patient was noted with wet vocal quality- she was asked to clear-which she did with productive throat clear- she was noted to expectorate to level of oral cavity with request for suctioning- no suctioning in room however dicussed independent secretion management with patient including expectoration from oral cavity or swallowing secretions- reciprocal comprehension was verbally expressed and visually demonstrated  Patient was fed pureed beans, chicken, soup and ice cream with thin liquids via straw sip- after a few bites of soup she refused further stating she did not like it  She consumed all of her beans, 1/2 of her chicken and all of her ice cream in addition to 2oz apple juice and approx 4oz of iced tea  Bolus formation and transfer continue to be slow yet functional with no stasis noted  No loss of bolus posteriorly suspected today  Swallowing initiation timing was variable again today ranging from prompt to mildly delayed  Laryngeal rise was palpated and judged to be mildly reduced  No coughing, throat clearing, change in vocal quality or respiratory status noted today       Assessment:  Patient continues to present with mild-moderate oropharyngeal dysphagia with increased risk of aspiration with rapid rate/vol of intake- she continues to benefit from use of liquid wash and feeding assistance     Suspect patient is at her baseline swallow function at this time- patient is currently refusing trials for diet upgrade- will follow up x1 and d/c      Plan/Recommendations:  Continue puree diet with thin liquids  meds as per patient/MD  Aspiration precautions     Feeding assist with the following strategies:  upright posture, only feed when alert, slow rate of feeding, small bites/sips and alternating bites and sips    Will follow up x1 and d/c     GENNARO Martinez S , [address] [facility]  Speech Language Pathologist   [id number]

## 2018-08-24 NOTE — PROGRESS NOTES
Progress Note - Sabrina Romano 1950, 70 y o  female MRN: [id number]    Unit/Bed#: [address] [place] [address] Encounter: [id number]    Primary Care Provider: Lady Oswaldo DO   Date and time admitted to hospital: 8/18/2018  7:01 PM        Acute on chronic combined systolic and diastolic congestive heart failure ([address] )   Assessment & Plan    Patient's echo showed EF of 55% with mild concentric hypertrophy without any significant valvular abnormalities  The etiology of sudden pulmonary edema and CHF is not clear  Continue Lasix 20 milligram IV q 12 hours  Patient has -10 liters since admission  Monitor strict I& O and daily weight  Cardiology consult appreciated  Patient might need nuclear stress test before discharge  Follow-up repeat chest x-ray        Toxic metabolic encephalopathy   Assessment & Plan    Patient's TSH, Depakote levels were normal   MRI of the brain showed chronic demyelinating disease with chronic microvascular changes  Follow-up EEG        Atrial fibrillation [facility])   Assessment & Plan    Patient noted to have low heart rate on telemetry and patient's amiodarone was stopped  Patient's heart rate continues to remain low-cannot rule out sick sinus syndrome  Continue Xarelto for anticoagulation        Chronic kidney disease, stage 3   Assessment & Plan    Patient's baseline creatinine is about 1 6-1 8  Creatinine close to baseline with IV diuresis        Essential hypertension   Assessment & Plan      Patient was added on spironolactone to enhance diuresis and also help with blood pressure        Seizure disorder [facility])   Assessment & Plan    Patient is on Depakote 500 milligram q h s  and 250 milligrams in the morning        Schizophrenia [facility])   Assessment & Plan    Patient is on Abilify        Hypothyroidism   Assessment & Plan    Patient is on levothyroxine 137 microgram p o  Daily    TSH elevated at 7   Will increase levothyroxine to 150 microgram twice a week        Mixed hyperlipidemia   Assessment & Plan    Continue Lipitor        Vitamin D deficiency   Assessment & Plan    Patient was started on vitamin D3 1000 units p o  daily        Obstructive sleep apnea   Assessment & Plan    Continue BiPAP q h s  Thrombocytopenia (HCC)   Assessment & Plan    History of chronic thrombocytopenia  No evidence of active bleeding  Platelet count continues to improve         Hypokalemia- likely secondary to diuretics  Repleted    VTE Pharmacologic Prophylaxis:   Pharmacologic: Rivaroxaban (Xarelto)  Mechanical VTE Prophylaxis in Place: Yes    Patient Centered Rounds: I have performed bedside rounds with nursing staff today  Discussions with Specialists or Other Care Team Provider: Cristina    Education and Discussions with Family / Patient: Yes    Time Spent for Care: 30 minutes  More than 50% of total time spent on counseling and coordination of care as described above  Current Length of Stay: 6 day(s)    Current Patient Status: Inpatient   Certification Statement: The patient will continue to require additional inpatient hospital stay due to Acute exacerbation of CHF    Discharge Plan: STR    Code Status: Level 3 - DNAR and DNI      Subjective:   Patient denies any shortness of breath  Patient denies any chest pain, abdominal pain, nausea or vomiting    Objective:     Vitals:   Temp (24hrs), Av 8 °F (37 1 °C), Min:98 °F (36 7 °C), Max:99 6 °F (37 6 °C)    HR:  [71-85] 78  Resp:  [18] 18  BP: (120-130)/(59-60) 130/60  SpO2:  [95 %-97 %] 95 %  Body mass index is 40 43 kg/m²  Input and Output Summary (last 24 hours): Intake/Output Summary (Last 24 hours) at 18 1653  Last data filed at 18 1500   Gross per 24 hour   Intake              360 ml   Output             2850 ml   Net            -2490 ml       Physical Exam:     Physical Exam   Constitutional: No distress  HENT:   Head: Normocephalic and atraumatic     Nose: Nose normal    Eyes: Conjunctivae and EOM are normal  Pupils are equal, round, and reactive to light  Neck: Normal range of motion  Neck supple  No JVD present  Cardiovascular: Normal rate and regular rhythm  Exam reveals no gallop and no friction rub  Murmur heard  Pulmonary/Chest: Effort normal  No respiratory distress  She has no wheezes  She has rales  She exhibits no tenderness  Abdominal: Soft  Bowel sounds are normal  She exhibits no distension  There is no tenderness  There is no rebound and no guarding  Musculoskeletal: She exhibits edema  Neurological: She is alert  No cranial nerve deficit  Skin: Skin is warm and dry  No rash noted  Psychiatric: She has a normal mood and affect  Additional Data:     Labs:      Results from last 7 days  Lab Units 08/24/18  0440 08/23/18  0533   WBC Thousand/uL 6 81 6 19   HEMOGLOBIN g/dL 10 8* 11 2*   HEMATOCRIT % 33 8* 36 0   PLATELETS Thousands/uL 106* 81*   NEUTROS PCT %  --  61   LYMPHS PCT %  --  22   MONOS PCT %  --  15*   EOS PCT %  --  1       Results from last 7 days  Lab Units 08/24/18  0440  08/19/18  0503   SODIUM mmol/L 145  < > 149*   POTASSIUM mmol/L 3 4*  < > 4 9   CHLORIDE mmol/L 104  < > 114*   CO2 mmol/L 37*  < > 25   BUN mg/dL 30*  < > 46*   CREATININE mg/dL 1 53*  < > 1 68*   CALCIUM mg/dL 9 0  < > 8 8   TOTAL PROTEIN g/dL  --   --  6 5   BILIRUBIN TOTAL mg/dL  --   --  0 60   ALK PHOS U/L  --   --  111   ALT U/L  --   --  27   AST U/L  --   --  21   GLUCOSE RANDOM mg/dL 84  < > 49*   < > = values in this interval not displayed  Results from last 7 days  Lab Units 08/18/18 1915   INR  1 14       * I Have Reviewed All Lab Data Listed Above  * Additional Pertinent Lab Tests Reviewed: [facility]        Recent Cultures (last 7 days):       Results from last 7 days  Lab Units 08/18/18 2236 08/18/18 1951 08/18/18 1915   BLOOD CULTURE   --   --  No Growth After 5 Days  No Growth After 5 Days     URINE CULTURE   --  50,000-59,000 cfu/ml Morganella morganii* --    LEGIONELLA URINARY ANTIGEN  Negative  --   --        Last 24 Hours Medication List:     Current Facility-Administered Medications:  acetaminophen 650 mg Oral Q6H PRN HAI Mauricio   albuterol 2 5 mg Nebulization Q4H PRN [place] Branch, CRISIS   atorvastatin 10 mg Oral Daily Macrina Flavors, PA-C   [START ON 8/25/2018] cholecalciferol 1,000 Units Oral Daily Gm Lua MD   dextran 70-hypromellose 1 drop Both Eyes BID [place] Branch, CRISIS   dextrose 25 mL Intravenous PRN Macrina Flavors, PA-C   divalproex sodium 250 mg Oral Daily Macrina Flavors, PA-C   divalproex sodium 500 mg Oral HS Macrina Flavors, PA-C   furosemide 20 mg Intravenous Q12H HAI Mauricio   [START ON 8/25/2018] levothyroxine 137 mcg Oral Every Other Day Shannon Tony MD   levothyroxine 150 mcg Oral Every Other Day Shannon Tony MD   nystatin 1 application Topical TID Macrina Flavors, PA-C   rivaroxaban 15 mg Oral Daily With Breakfast [place] Branch, HAI   senna-docusate sodium 1 tablet Oral BID [place] Branch, CRNP   spironolactone 25 mg Oral Daily [place] Jaclyn, HAI   tamsulosin 0 8 mg Oral HS [place] Branch, CRNP   thiamine 100 mg Oral Daily Macrina Flavors, PA-C        Today, Patient Was Seen By: Shannon Tony MD    ** Please Note: Dictation voice to text software may have been used in the creation of this document   **

## 2018-08-24 NOTE — PROGRESS NOTES
Progress Note - Cardiology   Royal Torres 70 y o  female MRN: [id number]  Unit/Bed#: [address] Encounter: [id number]    Assessment/Plan:  1  Acute combined heart failure:  EF 50% with regurgitation of the aortic, mitral and tricuspid valves  Continue her IV Lasix 20 mg b i d  with Aldactone 25 mg once a day  Monitor her I&O, electrolytes and daily weights  Encouraged low-salt diet  2   Bilateral pleural effusions: Will recheck x-ray in the a m  and compared to previous to see if there is resolution  Continue diuretics  3   Hypertension:  Antihypertensives were held on admission  Continue monitor patient and reintroduced medication as needed  4   Chronic atrial fibrillation:  Ventricular rate on telemetry has been controlled  Continue her Zaroxolyn  Continue monitor telemetry  5   Morbid obesity, concern for hypoventilation syndrome and obstructive sleep apnea:  Patient's BMI is 40 1  States that she does not think she could tolerate mask  6   Subclinical hypothyroidism:  Continue her Synthroid        Subjective/Objective   Patient seen examined  She is very sleepy today easily arouses but when not stimulated will fall back to sleep  States "No" to questions regarding review of systems  Patient diuresed approximately 10 L of fluid since admission to the hospital   Continue to monitor her renal function closely while on IV diuretics and Aldactone        Vitals: /60 (BP Location: Right arm)   Pulse 78   Temp 99 °F (37 2 °C) (Oral)   Resp 18   Ht 5' 5" (1 651 m)   Wt 110 kg (242 lb 15 2 oz)   SpO2 95%   BMI 40 43 kg/m²   Vitals:    08/23/18 0600 08/24/18 0537   Weight: 110 kg (241 lb 6 5 oz) 110 kg (242 lb 15 2 oz)     Orthostatic Blood Pressures      Most Recent Value   Blood Pressure  130/60 filed at 08/24/2018 0730   Patient Position - Orthostatic VS  Lying filed at 08/24/2018 0730            Intake/Output Summary (Last 24 hours) at 08/24/18 1318  Last data filed at 08/24/18 0537   Gross per 24 hour   Intake                0 ml   Output             1850 ml   Net            -1850 ml       Invasive Devices     Peripheral Intravenous Line            Long-Dwell Peripheral IV (Midline) 41/04/48 Left Cephalic 1 day          Drain            Urethral Catheter Non-latex 16 Fr  5 days                Review of Systems:  Unable to ascertain as patient is sleepy  Easily arousable falls back to sleep quickly    Physical Exam: /60 (BP Location: Right arm)   Pulse 78   Temp 99 °F (37 2 °C) (Oral)   Resp 18   Ht 5' 5" (1 651 m)   Wt 110 kg (242 lb 15 2 oz)   SpO2 95%   BMI 40 43 kg/m²   General appearance: alert and slowed mentation  Head: Normocephalic, without obvious abnormality, atraumatic  Neck: no adenopathy, no carotid bruit, supple, symmetrical, trachea midline, thyroid not enlarged, symmetric, no tenderness/mass/nodules and Mild JVD  Lungs: Decreased breath sounds with coarse crackles bilaterally at bases  Heart: regular rate and rhythm and systolic murmur: systolic ejection 2/6, musical at 2nd left intercostal space, at 2nd right intercostal space  Abdomen: soft, non-tender; bowel sounds normal; no masses,  no organomegaly  Extremities: Plus two pedal and ankle edema to mid calf  Lab Results:   I have personally reviewed pertinent lab results      CBC with diff:   Results from last 7 days  Lab Units 08/24/18  0440   WBC Thousand/uL 6 81   RBC Million/uL 3 52*   HEMOGLOBIN g/dL 10 8*   HEMATOCRIT % 33 8*   MCV fL 96   MCH pg 30 7   MCHC g/dL 32 0   RDW % 15 4*   MPV fL 12 6   PLATELETS Thousands/uL 106*     CMP:     VTE Pharmacologic Prophylaxis:     On Xarelto

## 2018-08-24 NOTE — SOCIAL WORK
Spoke with pt at the bedside  Pt is tired but awake  Discussed IMM with her and her MBP letter  Provided copies of notice and MBP letter to the pt  Pt unable to sign due to dozing off while holding pen  CM awoke pt who gave verbal acknowledgement and advised that she is too tired to sign at this time

## 2018-08-24 NOTE — ASSESSMENT & PLAN NOTE
History of chronic thrombocytopenia  No evidence of active bleeding  Platelet count continues to improve

## 2018-08-24 NOTE — ASSESSMENT & PLAN NOTE
Patient's echo showed EF of 55% with mild concentric hypertrophy without any significant valvular abnormalities  The etiology of sudden pulmonary edema and CHF is not clear  Continue Lasix 20 milligram IV q 12 hours    Patient has -10 liters since admission  Monitor strict I& O and daily weight  Cardiology consult appreciated  Patient might need nuclear stress test before discharge  Follow-up repeat chest x-ray

## 2018-08-24 NOTE — ASSESSMENT & PLAN NOTE
Patient's TSH, Depakote levels were normal   MRI of the brain showed chronic demyelinating disease with chronic microvascular changes  Follow-up EEG

## 2018-08-24 NOTE — PLAN OF CARE
Problem: Nutrition/Hydration-ADULT  Goal: Nutrient/Hydration intake appropriate for improving, restoring or maintaining nutritional needs  Monitor and assess patient's nutrition/hydration status for malnutrition (ex- brittle hair, bruises, dry skin, pale skin and conjunctiva, muscle wasting, smooth red tongue, and disorientation)  Collaborate with interdisciplinary team and initiate plan and interventions as ordered  Monitor patient's weight and dietary intake as ordered or per policy  Utilize nutrition screening tool and intervene per policy  Determine patient's food preferences and provide high-protein, high-caloric foods as appropriate  INTERVENTIONS:  - Monitor oral intake, urinary output, labs, and treatment plans  - Assess nutrition and hydration status and recommend course of action  - Evaluate amount of meals eaten  - Assist patient with eating if necessary   - Allow adequate time for meals  - Recommend/ encourage appropriate diets, oral nutritional supplements, and vitamin/mineral supplements  - Order, calculate, and assess calorie counts as needed  - Recommend, monitor, and adjust tube feedings and TPN/PPN based on assessed needs  - Assess need for intravenous fluids  - Provide specific nutrition/hydration education as appropriate  - Include patient/family/caregiver in decisions related to nutrition   Outcome: Progressing    Noted 25-50% meal completions on pureed diet order  Recommend to provide Ensure Enlive BID to better meet estimated needs due to suboptimal overall po intake  RD currently does not have protocol to provide order

## 2018-08-25 ENCOUNTER — APPOINTMENT (INPATIENT)
Dept: RADIOLOGY | Facility: HOSPITAL | Age: 68
DRG: 871 | End: 2018-08-25
Payer: MEDICARE

## 2018-08-25 LAB — THYROPEROXIDASE AB SERPL-ACNC: 11 IU/ML (ref 0–34)

## 2018-08-25 PROCEDURE — 94640 AIRWAY INHALATION TREATMENT: CPT

## 2018-08-25 PROCEDURE — 99232 SBSQ HOSP IP/OBS MODERATE 35: CPT | Performed by: FAMILY MEDICINE

## 2018-08-25 PROCEDURE — 99232 SBSQ HOSP IP/OBS MODERATE 35: CPT | Performed by: INTERNAL MEDICINE

## 2018-08-25 PROCEDURE — 94669 MECHANICAL CHEST WALL OSCILL: CPT

## 2018-08-25 PROCEDURE — 71045 X-RAY EXAM CHEST 1 VIEW: CPT

## 2018-08-25 PROCEDURE — 84166 PROTEIN E-PHORESIS/URINE/CSF: CPT | Performed by: PSYCHIATRY & NEUROLOGY

## 2018-08-25 PROCEDURE — 97535 SELF CARE MNGMENT TRAINING: CPT

## 2018-08-25 PROCEDURE — 94668 MNPJ CHEST WALL SBSQ: CPT

## 2018-08-25 PROCEDURE — 94760 N-INVAS EAR/PLS OXIMETRY 1: CPT

## 2018-08-25 PROCEDURE — 93005 ELECTROCARDIOGRAM TRACING: CPT

## 2018-08-25 RX ORDER — POTASSIUM CHLORIDE 20 MEQ/1
40 TABLET, EXTENDED RELEASE ORAL ONCE
Status: DISCONTINUED | OUTPATIENT
Start: 2018-08-25 | End: 2018-08-25

## 2018-08-25 RX ADMIN — DIVALPROEX SODIUM 250 MG: 500 TABLET, EXTENDED RELEASE ORAL at 09:01

## 2018-08-25 RX ADMIN — LEVOTHYROXINE SODIUM 137 MCG: 112 TABLET ORAL at 09:03

## 2018-08-25 RX ADMIN — VITAMIN D, TAB 1000IU (100/BT) 1000 UNITS: 25 TAB at 09:03

## 2018-08-25 RX ADMIN — FUROSEMIDE 20 MG: 10 INJECTION, SOLUTION INTRAMUSCULAR; INTRAVENOUS at 21:32

## 2018-08-25 RX ADMIN — FUROSEMIDE 20 MG: 10 INJECTION, SOLUTION INTRAMUSCULAR; INTRAVENOUS at 09:03

## 2018-08-25 RX ADMIN — NYSTATIN 1 APPLICATION: 100000 POWDER TOPICAL at 09:04

## 2018-08-25 RX ADMIN — SPIRONOLACTONE 25 MG: 25 TABLET, FILM COATED ORAL at 09:03

## 2018-08-25 RX ADMIN — NYSTATIN 1 APPLICATION: 100000 POWDER TOPICAL at 21:40

## 2018-08-25 RX ADMIN — Medication 100 MG: at 09:02

## 2018-08-25 RX ADMIN — ATORVASTATIN CALCIUM 10 MG: 10 TABLET, FILM COATED ORAL at 09:02

## 2018-08-25 RX ADMIN — RIVAROXABAN 15 MG: 15 TABLET, FILM COATED ORAL at 09:03

## 2018-08-25 RX ADMIN — DIVALPROEX SODIUM 500 MG: 500 TABLET, EXTENDED RELEASE ORAL at 21:36

## 2018-08-25 RX ADMIN — SENNOSIDES AND DOCUSATE SODIUM 1 TABLET: 8.6; 5 TABLET ORAL at 21:36

## 2018-08-25 RX ADMIN — NYSTATIN 1 APPLICATION: 100000 POWDER TOPICAL at 17:02

## 2018-08-25 RX ADMIN — TAMSULOSIN HYDROCHLORIDE 0.8 MG: 0.4 CAPSULE ORAL at 21:36

## 2018-08-25 RX ADMIN — ALBUTEROL SULFATE 2.5 MG: 2.5 SOLUTION RESPIRATORY (INHALATION) at 07:34

## 2018-08-25 RX ADMIN — SENNOSIDES AND DOCUSATE SODIUM 1 TABLET: 8.6; 5 TABLET ORAL at 09:03

## 2018-08-25 NOTE — ASSESSMENT & PLAN NOTE
Not actively being treated at this time    MRI brain showed findings of chronic demyelinating disease but no acute intracranial abnormality noted

## 2018-08-25 NOTE — PROGRESS NOTES
Cardiology Progress Note  ASSESSMENT:    1  Clinically improved acutely decompensated diastolic heart failure with bilateral pleural effusions  2   Controlled essential hypertension  3  Chronic atrial fibrillation with apical heart rate approximately 100 bpm and patient no longer on telemetry  4   Chronic morbid obesity with possibility of alveolar hypoventilation syndrome and obstructive sleep apnea  5   Treated hypothyroidism  6  Chronic demyelinating disease of brain with encephalopathy  7  Chronic kidney disease, stage III with baseline creatinine currently reached  8  History of seizure disorder  9  History of schizophrenia  10  Mixed dyslipidemia  11  Vitamin-D deficiency  12  Improving thrombocytopenia, chronic    PLAN:    1  Will recheck EKG today regarding increase in heart rate  2   Will also check chest x-ray for signs of resolution of heart failure and switch to oral diuretics, if confirmed  3   Would lean towards performing Lamona Yusuf nuclear stress test as an inpatient in view of the above diagnoses  HPI:    Ms Saab Kidney Denies new cardiopulmonary or  medical symptoms  Objective   Patient's fluid balance since admission is (-)10 78 liters  Today's potassium is 3 4 with CO2 content 37, markedly increased from baseline of 27  BUN/creatinine currently 30/1 53 with baseline of 38/2 03 and estimated GFR has risen to 33 from baseline of 24  H/H 10 8/33 8 with platelets having risen to 106K from 52K on admission    Physical Exam:   /69 (BP Location: Right arm)   Pulse 87   Temp 99 °F (37 2 °C) (Oral)   Resp 20   Ht 5' 5" (1 651 m)   Wt 110 kg (242 lb 15 2 oz)   SpO2 98%   BMI 40 43 kg/m²   Body mass index is 40 43 kg/m²  General Appearance:  Alert, cooperative, no distress, appears stated age and is morbidly obese  She seems somewhat confused     Head:  Normocephalic, without obvious abnormality, atraumatic   Eyes:  PERRL, conjunctiva/corneas clear, EOM's intact, both eyes   Ears:  Normal TM's and external ear canals, both ears   Nose: Nares normal, septum midline, mucosa normal, no drainage or sinus tenderness   Throat: Lips, mucosa, and tongue normal; teeth and gums normal   Neck: Supple, symmetrical, trachea midline, no adenopathy, thyroid: not enlarged, symmetric, no tenderness/mass/nodules, no carotid bruit or JVD   Back:   Symmetric, no curvature, ROM normal, no CVA tenderness   Lungs:   Clear to auscultation bilaterally, respirations unlabored, with diminished breath sounds at left posterior base   Chest Wall:  No tenderness or deformity   Heart:  Irregularly irregular cardiac rhythm, S1, S2 normal, no murmur, rub or gallop   Abdomen:   Soft, non-tender, bowel sounds active all four quadrants,  no masses, no organomegaly with marked obesity noted   Extremities: Extremities normal, atraumatic, no cyanosis or edema   Pulses: 2+ and symmetric   Skin: Skin showed pale color, with normal texture, turgor and no rashes or lesions   Lymph nodes: Cervical, supraclavicular, and axillary nodes normal   Neurologic: Normal         Cardiographics  ECG pending                Echocardiogram             Not applicable    Imaging  Chest X-Ray  is being done this afternoon             Lab Review   Current Facility-Administered Medications   Medication Dose Route Frequency Provider Last Rate Last Dose    acetaminophen (TYLENOL) tablet 650 mg  650 mg Oral Q6H PRN HAI Sparks        albuterol inhalation solution 2 5 mg  2 5 mg Nebulization Q4H PRN HAI Lemus   2 5 mg at 08/25/18 0734    atorvastatin (LIPITOR) tablet 10 mg  10 mg Oral Daily Estela Resendez PA-C   10 mg at 08/25/18 0902    cholecalciferol (VITAMIN D3) tablet 1,000 Units  1,000 Units Oral Daily Gm Lua MD   1,000 Units at 08/25/18 0903    dextran 70-hypromellose (GENTEAL TEARS) 0 1-0 3 % ophthalmic solution 1 drop  1 drop Both Eyes BID HAI Lemus   1 drop at 08/24/18 1007  dextrose 50 % IV solution 25 mL  25 mL Intravenous PRN Alyssia De Leon PA-C   25 mL at 08/19/18 1911    divalproex sodium (DEPAKOTE ER) 24 hr tablet 250 mg  250 mg Oral Daily Alyssia De Leon PA-C   250 mg at 08/25/18 0901    divalproex sodium (DEPAKOTE ER) 24 hr tablet 500 mg  500 mg Oral HS Alyssia De Leon PA-C   500 mg at 08/24/18 2141    furosemide (LASIX) injection 20 mg  20 mg Intravenous Q12H HAI Kay   20 mg at 08/25/18 7655    levothyroxine tablet 137 mcg  137 mcg Oral Every Other Day Kath Ta MD   137 mcg at 08/25/18 6457    levothyroxine tablet 150 mcg  150 mcg Oral Every Other Day Kath Ta MD   150 mcg at 08/24/18 0900    nystatin (MYCOSTATIN) powder 1 application  1 application Topical TID Alyssia De Leon PA-C   1 application at 93/22/90 5343    rivaroxaban (XARELTO) tablet 15 mg  15 mg Oral Daily With Breakfast HAI Reilly   15 mg at 08/25/18 8907    senna-docusate sodium (SENOKOT S) 8 6-50 mg per tablet 1 tablet  1 tablet Oral BID HAI Reilly   1 tablet at 08/25/18 1835    spironolactone (ALDACTONE) tablet 25 mg  25 mg Oral Daily HAI Perez   25 mg at 08/25/18 0293    tamsulosin (FLOMAX) capsule 0 8 mg  0 8 mg Oral HS HAI Reilly   0 8 mg at 08/24/18 2141    thiamine (VITAMIN B1) tablet 100 mg  100 mg Oral Daily Alyssia De Leon PA-C   100 mg at 08/25/18 1492

## 2018-08-25 NOTE — ASSESSMENT & PLAN NOTE
Symptoms have improved and now appears compensated  She was initially on IV Lasix and now transitioned to p o  Lasix daily  Echo showed EF of 51% with mild concentric hypertrophy with mild aortic and mitral valve regurgitation   Etiology of sudden pulmonary edema and CHF is not clear  Continue Aldactone, Cozaar  Repeat chest x-ray showed improving vascular congestion  Cardiology follow-up after discharge   repeat BMP as outpatient  Patient had nuclear stress test which was normal

## 2018-08-25 NOTE — SOCIAL WORK
Pt is long term at [facility], able to return when she is medically stable for DC

## 2018-08-25 NOTE — PLAN OF CARE
Problem: OCCUPATIONAL THERAPY ADULT  Goal: Performs self-care activities at highest level of function for planned discharge setting  See evaluation for individualized goals  Outcome: Progressing  Limitation: Decreased ADL status, Decreased UE strength, Decreased Safe judgement during ADL, Decreased endurance, Decreased high-level ADLs, Decreased self-care trans (decreased balance )  Prognosis: Fair  Assessment: Patient tolerated well  Alert and cooperative  Able to follow 1 step commands  Patient able to feel self with min assist to hold container with spoon and without dripping  Min assist for beverage management  Patient required mod assist for brushing hair        OT Discharge Recommendation:  (return to SNF with PT/OT)

## 2018-08-25 NOTE — ASSESSMENT & PLAN NOTE
Titrated off supplemental oxygen by nasal cannula and appears stable  Required BiPAP support initially and was in the ICU

## 2018-08-25 NOTE — ASSESSMENT & PLAN NOTE
Patient's Depakote levels were normal  EEG showed mild background slowing as per Neurology    MRI of the brain showed chronic demyelinating disease but no acute intracranial abnormality

## 2018-08-25 NOTE — PROGRESS NOTES
[facility] Progress Note  Patient: Ivelisse Salazar 70 y o  female   MRN: [id number]  PCP: Katina Cote DO  Unit/Bed#: [address] [place] [address] Encounter: [id number]  Date Of Visit: 08/25/18    Problem List:    Principal Problem:    Acute on chronic combined systolic and diastolic congestive heart failure ([facility] )  Active Problems:    Acute respiratory failure with hypoxia (HCC)    Toxic metabolic encephalopathy    Bilateral pleural effusion    Chronic kidney disease, stage 3    Thrombocytopenia (HCC)    Multiple sclerosis (HCC)    Seizure disorder (HCC)    Atrial fibrillation ([facility] )    Essential hypertension    Mixed hyperlipidemia    [facility] ([facility]    Hypothyroidism    Obstructive sleep apnea    Vitamin D deficiency      Assessment & Plan:    * Acute on chronic combined systolic and diastolic congestive heart failure (HCC)   Assessment & Plan    Echo showed EF of 51% with mild concentric hypertrophy with mild aortic and mitral valve regurgitation   Etiology of sudden pulmonary edema and CHF is not clear  Continue IV Lasix 20mg q 12 hours  Continue Aldactone  Monitor strict I& O and daily weight  Cardiology consult appreciated  She may need nuclear stress test before discharge  Follow-up repeat chest x-ray        Acute respiratory failure with hypoxia (HCC)   Assessment & Plan    Continue supplemental oxygen by nasal cannula and titrate off as tolerated  Required BiPAP support initially and was in the ICU        Chronic kidney disease, stage 3   Assessment & Plan    Patient's baseline creatinine is about 1 6-1 8  Creatinine at baseline with IV diuresis  Repeat lab work in a m  Bilateral pleural effusion   Assessment & Plan    Likely due to congestive heart failure exacerbation  Continue Lasix    Follow-up x-ray        Toxic metabolic encephalopathy   Assessment & Plan    Patient's Depakote levels were normal   MRI of the brain showed chronic demyelinating disease but no acute intracranial abnormality  As per Neurology, EEG showed mild background slowing         Vitamin D deficiency   Assessment & Plan    continue vitamin D3 1000 units p o  daily        Obstructive sleep apnea   Assessment & Plan    Needs sleep study as outpatient        Hypothyroidism   Assessment & Plan    TSH noted to be elevated at 7  She is currently on regimen of Patient is on levothyroxine 137 microgram with levothyroxine 150 mcg every other day        Schizophrenia [facility])   Assessment & Plan    Patient is on Abilify which is on hold currently        Mixed hyperlipidemia   Assessment & Plan    Continue statin        Essential hypertension   Assessment & Plan    Continue Aldactone        Atrial fibrillation [facility])   Assessment & Plan    Patient was noted to have low heart rate on telemetry and her amiodarone was discontinued  Continue Xarelto        Seizure disorder [facility])   Assessment & Plan    Continue Depakote        Multiple sclerosis ([facility] )   Assessment & Plan    Not actively treated at this time  MRI brain showed findings of chronic demyelinating disease but no acute intracranial abnormality noted         Thrombocytopenia (HCC)   Assessment & Plan    History of chronic thrombocytopenia  Platelet count stable on prior lab work        Hypoglycemiaresolved as of 8/22/2018   Assessment & Plan    resolved        Leukopeniaresolved as of 8/21/2018   Assessment & Plan    resolved              VTE Pharmacologic Prophylaxis:   Pharmacologic: Rivaroxaban (Xarelto)  Mechanical VTE Prophylaxis in Place: Yes    Patient Centered Rounds: I have performed bedside rounds with nursing staff today  Discussions with Specialists or Other Care Team Provider: Yes    Education and Discussions with Family / Patient:Yes    Time Spent for Care: 30 minutes  More than 50% of total time spent on counseling and coordination of care as described above      Current Length of Stay: 7 day(s)    Current Patient Status: Inpatient     Discharge Plan: sTR    Code Status: Level 3 - DNAR and DNI    Certification Statement: The patient will continue to require additional inpatient hospital stay due to CHF exacerbation      Subjective:   Denies any chest pain, shortness of breath    Objective:     Vitals:   Temp (24hrs), Av 5 °F (36 9 °C), Min:97 8 °F (36 6 °C), Max:99 °F (37 2 °C)    HR:  [74-87] 87  Resp:  [18-20] 20  BP: (131-144)/(66-72) 131/69  SpO2:  [95 %-98 %] 98 %  Body mass index is 40 43 kg/m²  Input and Output Summary (last 24 hours): Intake/Output Summary (Last 24 hours) at 18 1520  Last data filed at 18 1326   Gross per 24 hour   Intake              957 ml   Output             1600 ml   Net             -643 ml       Physical Exam:     Physical Exam   Constitutional: She appears well-developed and well-nourished  No distress  HENT:   Head: Normocephalic and atraumatic  Eyes: EOM are normal  Right eye exhibits no discharge  Left eye exhibits no discharge  No scleral icterus  Neck: Neck supple  Cardiovascular: Normal rate and regular rhythm  Murmur heard  Pulmonary/Chest: Effort normal  No respiratory distress  She has no wheezes  She has rales  Abdominal: Soft  Bowel sounds are normal  She exhibits no distension  There is no tenderness  Musculoskeletal: She exhibits edema  Neurological: She is alert  No cranial nerve deficit  She exhibits abnormal muscle tone  Oriented to place, self and knew what month it is but did not know the year  Speech is slow  Bilateral upper extremity motor strength 2/5  Right L E 2/5 and left L E 1/5 motor strength   Skin: Skin is dry  She is not diaphoretic         Additional Data:     Labs:      Results from last 7 days  Lab Units 18  0440 18  0533   WBC Thousand/uL 6 81 6 19   HEMOGLOBIN g/dL 10 8* 11 2*   HEMATOCRIT % 33 8* 36 0   PLATELETS Thousands/uL 106* 81*   NEUTROS PCT %  --  61   LYMPHS PCT %  --  22   MONOS PCT %  --  15*   EOS PCT %  --  1       Results from last 7 days  Lab Units 08/24/18  0440  08/19/18  0503   SODIUM mmol/L 145  < > 149*   POTASSIUM mmol/L 3 4*  < > 4 9   CHLORIDE mmol/L 104  < > 114*   CO2 mmol/L 37*  < > 25   BUN mg/dL 30*  < > 46*   CREATININE mg/dL 1 53*  < > 1 68*   CALCIUM mg/dL 9 0  < > 8 8   TOTAL PROTEIN g/dL  --   --  6 5   BILIRUBIN TOTAL mg/dL  --   --  0 60   ALK PHOS U/L  --   --  111   ALT U/L  --   --  27   AST U/L  --   --  21   GLUCOSE RANDOM mg/dL 84  < > 49*   < > = values in this interval not displayed  Results from last 7 days  Lab Units 08/18/18  1915   INR  1 14       * I Have Reviewed All Lab Data Listed Above  * Additional Pertinent Lab Tests Reviewed: All Labs For Current Hospital Admission Reviewed    Imaging:  Ct Chest Abdomen Pelvis Wo Contrast    Result Date: 8/20/2018  Narrative: CT CHEST, ABDOMEN AND PELVIS WITHOUT IV CONTRAST INDICATION:   Sepsis  COMPARISON:  CT abdomen and pelvis 11/3/2011 TECHNIQUE: CT examination of the chest, abdomen and pelvis was performed without intravenous contrast   Axial, sagittal, and coronal 2D reformatted images were created from the source data and submitted for interpretation  Radiation dose length product (DLP) for this visit:  1744 26 mGy-cm   This examination, like all CT scans performed in the [facility], was performed utilizing techniques to minimize radiation dose exposure, including the use of iterative reconstruction and automated exposure control  Enteric contrast was administered  FINDINGS: CHEST LUNGS:  Bilateral lower lobe passive atelectasis noted with subtle groundglass upper lobe opacities likely inflammatory and/or infectious  PLEURA:  Moderate to large bilateral pleural effusions, right greater than left  HEART/GREAT VESSELS:  The heart is enlarged with no pericardial effusion  MEDIASTINUM AND DEBO:  No pathologic lymphadenopathy  There is reflux of contrast into the midesophagus  CHEST WALL AND LOWER NECK:   Unremarkable   ABDOMEN LIVER/BILIARY TREE:  Trace perihepatic ascites  No discrete mass on this nonenhanced study nor biliary ductal dilatation  GALLBLADDER:  Gallbladder is surgically absent  SPLEEN:  Unremarkable  PANCREAS:  Unremarkable  ADRENAL GLANDS:  Unremarkable  KIDNEYS/URETERS:  Chronic right renal atrophy  No hydronephrosis or perinephric collections  STOMACH AND BOWEL:  Constipation with no bowel obstruction  APPENDIX:  A normal appendix was visualized  ABDOMINOPELVIC CAVITY:  Minimal ascites predominantly in the upper abdomen and bilateral flanks  VESSELS:  Unremarkable for patient's age  PELVIS REPRODUCTIVE ORGANS:  Patient is status post hysterectomy  Prominent right ovary is a stable finding  URINARY BLADDER:  Catheterized, thick wall bladder noted with mild pericystic inflammation  Cystitis is not excluded  ABDOMINAL WALL/INGUINAL REGIONS:  3rd spacing of fluid concerning for anasarca in the lower abdomen and pelvis  OSSEOUS STRUCTURES:  No acute fracture or destructive osseous lesion  There is multilevel degenerative disc disease of the scoliotic spine  Impression: 1  Bilateral pleural effusions, right greater than left, with lower lobe compressive atelectasis  Scattered groundglass infiltrates likely infectious or inflammatory  2   Trace ascites with mild anasarca in the lower abdominal wall and pelvis  3   Constipation  Workstation performed: [id number]     X-ray Chest 1 View Portable    Result Date: 8/18/2018  Narrative: CHEST INDICATION:   resp distress  COMPARISON:  AP chest 4/4/2014  EXAM PERFORMED/VIEWS:  XR CHEST PORTABLE FINDINGS: Stable cardiac and mediastinal contours  Prominent central vasculature and bilateral airspace disease likely on the basis of pulmonary edema  Small bibasilar pleural effusions  Osseous structures appear within normal limits for patient age  Impression: Prominent central vasculature and bilateral airspace disease likely on the basis of pulmonary edema    Small bibasilar pleural effusions  Workstation performed: [id number]     Ct Head Without Contrast    Result Date: 8/18/2018  Narrative: CT BRAIN - WITHOUT CONTRAST INDICATION:   lethargic  Patient found unresponsive at nursing home  COMPARISON:  3/12/2014  TECHNIQUE:  CT examination of the brain was performed  In addition to axial images, coronal 2D reformatted images were created and submitted for interpretation  Radiation dose length product (DLP) for this visit:  1195 01 mGy-cm   This examination, like all CT scans performed in the [facility], was performed utilizing techniques to minimize radiation dose exposure, including the use of iterative reconstruction and automated exposure control  IMAGE QUALITY:  Diagnostic  FINDINGS: PARENCHYMA: Decreased attenuation is noted in periventricular and subcortical white matter demonstrating an appearance that is statistically most likely to represent mild microangiopathic change  No CT signs of acute infarction  No intracranial mass, mass effect or midline shift  No acute parenchymal hemorrhage  VENTRICLES AND EXTRA-AXIAL SPACES:  Normal for the patient's age  VISUALIZED ORBITS AND PARANASAL SINUSES:  Unremarkable  CALVARIUM AND EXTRACRANIAL SOFT TISSUES:  Normal      Impression: No acute intracranial abnormality  Microangiopathic changes  Workstation performed: [id number]     Mri Brain W Wo Contrast    Result Date: 8/24/2018  Narrative: MRI BRAIN WITH AND WITHOUT CONTRAST INDICATION: ams, r/o mets, h/o mets in thoracic spine  History of demyelinating disease  COMPARISON:  12/30/2016  TECHNIQUE: Sagittal T1, axial T2, axial FLAIR, axial T1, axial Gilbert, axial diffusion  Sagittal, axial T1 postcontrast   Axial bravo postcontrast with coronal reconstructions  IV Contrast:  5 mL of Gadobutrol injection (SINGLE-DOSE)  IMAGE QUALITY:   Examination limited by patient motion   FINDINGS: BRAIN PARENCHYMA:  Right matter hyperintensities are seen within the periventricular white matter bilaterally consistent with chronic demyelinating disease, possibly with superimposed chronic microangiopathic change  This appears grossly stable  Brainstem and cerebellum demonstrate normal signal   Diffusion imaging is unremarkable with no signs of acute ischemia  Normal corpus callosum and hypothalamus  Mild volume loss of the cerebral hemispheres, brainstem and cerebellum  Postcontrast imaging of the brain demonstrates no abnormal enhancement  VENTRICLES:  Stable mild ventricular enlargement with particular enlargement of the atria of the lateral ventricles  This is likely related to the degree of chronic white matter change and cerebral volume loss  SELLA AND PITUITARY GLAND:  Normal  ORBITS:  Normal  PARANASAL SINUSES:  Normal  VASCULATURE:  Evaluation of the major intracranial vasculature demonstrates appropriate flow voids  CALVARIUM AND SKULL BASE:  Normal  EXTRACRANIAL SOFT TISSUES:  Normal      Impression: Stable chronic white matter changes consistent with chronic demyelinating disease, possibly with superimposed chronic microangiopathic change  No acute ischemia, mass or hemorrhage  Workstation performed: [id number]     Mri Thoracic Spine Wo Contrast    Result Date: 8/7/2018  Narrative: MRI THORACIC SPINE WITHOUT CONTRAST INDICATION: 69-year-old female, back pain, MS COMPARISON:  12/30/2016 MRI; 2/15/2017 PET/CT TECHNIQUE:  Sagittal T1, sagittal T2, sagittal inversion recovery, axial T2,  axial 2D MERGE  IMAGE QUALITY: Diagnostic  FINDINGS: ALIGNMENT: Normal alignment of the thoracic spine  No compression fracture  No subluxation  No scoliosis  MARROW SIGNAL:  Development of abnormal marrow diffusely at T6, T7 and T8 vertebral bodies  Persistent abnormal marrow at T10  Findings are suspicious for progressive tumor involvement  THORACIC CORD: Normal signal within the thoracic cord  PARAVERTEBRAL SOFT TISSUES: Development of moderate right pleural effusion    Development of small to moderate left pleural effusion  THORACIC DEGENERATIVE CHANGE: Advanced multilevel degenerative spondylosis again noted     Impression: Development of abnormal marrow at T6, T7 and T8  Persistent abnormal marrow at T10  Findings suspicious for metastatic tumor  Correlation with repeat PET/CT may be warranted  Persistent advanced multilevel degenerative spondylosis Development of bilateral pleural effusions Workstation performed: [id number]     Xr Chest Portable Icu    Result Date: 8/22/2018  Narrative: CHEST INDICATION:   CHF  COMPARISON:  8/20/2018 EXAM PERFORMED/VIEWS:  XR CHEST PORTABLE ICU FINDINGS: Heart shadow is enlarged but unchanged from prior exam  Mild improvement in congestive changes with bibasilar densities and pleural effusions  Osseous structures appear within normal limits for patient age  Impression: Mild improvement in congestive changes with bibasilar densities and pleural effusions  Workstation performed: [id number]     Xr Chest Portable Icu    Result Date: 8/20/2018  Narrative: CHEST INDICATION:   CHF/pneumonia  COMPARISON:  8/19/2018 EXAM PERFORMED/VIEWS:  XR CHEST PORTABLE ICU FINDINGS: Heart shadow is enlarged but unchanged from prior exam  Bilateral pleural effusions are now evident with bibasilar airspace opacities and pulmonary congestion  No improvement has occurred  There is no pneumothorax Osseous structures appear within normal limits for patient age  Impression: Bilateral pleural effusions with bibasilar airspace disease and pulmonary congestion  There has been no interval improvement Workstation performed: [id number]     Xr Chest Portable Icu    Result Date: 8/20/2018  Narrative: CHEST INDICATION:   Pneumonia  COMPARISON:  8/18/2018 EXAM PERFORMED/VIEWS:  XR CHEST PORTABLE ICU FINDINGS: Heart shadow is enlarged but unchanged from prior exam  There is persistent right basilar airspace disease, similar, and linear areas of atelectasis on the left, similar    No pneumothorax  Pulmonary vascular congestion and suspected right effusion again noted  Osseous structures appear within normal limits for patient age  Impression: No significant interval change since 8/18/2018 Workstation performed: [id number]     Imaging Reports Reviewed by myself    Cultures:   Blood Culture:   Lab Results   Component Value Date    BLOODCX No Growth After 5 Days  08/18/2018    BLOODCX No Growth After 5 Days   08/18/2018     Urine Culture:   Lab Results   Component Value Date    URINECX 50,000-59,000 cfu/ml Morganella morganii (A) 08/18/2018    URINECX >100,000 cfu/ml Mixed Contaminants X6 09/28/2016    URINECX >100,000 cfu/ml Pseudomonas aeruginosa 06/23/2016     Sputum Culture: No components found for: SPUTUMCX  Wound Culture: No results found for: WOUNDCULT    Last 24 Hours Medication List:     Current Facility-Administered Medications:  acetaminophen 650 mg Oral Q6H PRN HAI Jolly   albuterol 2 5 mg Nebulization Q4H PRN HAI Das   atorvastatin 10 mg Oral Daily Hung Shin PA-C   cholecalciferol 1,000 Units Oral Daily Chuyita Mirza MD   dextran 70-hypromellose 1 drop Both Eyes BID HAI Das   dextrose 25 mL Intravenous PRN Hung Shin PA-C   divalproex sodium 250 mg Oral Daily Hung Shin PA-C   divalproex sodium 500 mg Oral HS Hung Shin PA-C   furosemide 20 mg Intravenous Q12H HAI Oro   levothyroxine 137 mcg Oral Every Other Day Chuyita Mirza MD   levothyroxine 150 mcg Oral Every Other Day Chuyita Mirza MD   nystatin 1 application Topical TID Hung Shin PA-C   rivaroxaban 15 mg Oral Daily With Breakfast HAI Das   senna-docusate sodium 1 tablet Oral BID HAI Das   spironolactone 25 mg Oral Daily [place] YuriHAI   tamsulosin 0 8 mg Oral HS HAI Das   thiamine 100 mg Oral Daily Hung Shin PA-C        Today, Patient Was Seen By: Vickie Trujillo, DO    ** Please Note: Dragon 360 Dictation voice to text software may have been used in the creation of this document   **

## 2018-08-25 NOTE — ASSESSMENT & PLAN NOTE
Patient was noted to have low heart rate on telemetry and amiodarone was discontinued  Continue Xarelto

## 2018-08-25 NOTE — ASSESSMENT & PLAN NOTE
Patient's baseline creatinine is about 1 6-1 8  Creatinine has trended up on lab work today although appears close to baseline  Repeat lab work as outpatient

## 2018-08-25 NOTE — ASSESSMENT & PLAN NOTE
TSH noted to be elevated at 7      Continue levothyroxine 137 microgram alternating with levothyroxine 150 mcg every other day  Repeat TSH level in 4-6 weeks

## 2018-08-25 NOTE — OCCUPATIONAL THERAPY NOTE
OT TREATMENT       08/25/18 1420   Restrictions/Precautions   Other Precautions Chair Alarm; Bed Alarm; Fall Risk   Pain Assessment   Pain Assessment No/denies pain   ADL   Eating Assistance 4  Minimal Assistance   Eating Deficit Setup;Verbal cueing;Supervision/safety; Beverage management   Eating Comments pt able to feed self cup of food with spoon  Therapist held cup and patient able to feed self with spoon and verbal cues with RUE  Patient requires min assist to drink juice from cup to hold cup  Patient OOB in chair via mechanical lift by nursing today  Grooming Assistance 3  Moderate Assistance   Grooming Deficit Brushing hair   UB Bathing Assistance 2  Maximal Assistance   UB Bathing Comments washing hair with cap   Assessment   Assessment Patient tolerated well  Alert and cooperative  Able to follow 1 step commands  Patient able to feel self with min assist to hold container with spoon and without dripping  Min assist for beverage management  Patient required mod assist for brushing hair  Plan   Treatment Interventions ADL retraining;UE strengthening/ROM; Endurance training;Patient/family training;Equipment evaluation/education; Activityengagement; Compensatory technique education   Treatment Day 2   Recommendation   OT Discharge Recommendation (return to SNF with PT/OT)   Licensure   [place] License Number  Jason Ontiveros [address] 86 OTR/L [id number]

## 2018-08-25 NOTE — ASSESSMENT & PLAN NOTE
History of chronic thrombocytopenia    Platelet count improved on prior lab work and now within normal limits

## 2018-08-25 NOTE — PLAN OF CARE
CARDIOVASCULAR - ADULT     Maintains optimal cardiac output and hemodynamic stability Progressing     Absence of cardiac dysrhythmias or at baseline rhythm Progressing        DISCHARGE PLANNING - CARE MANAGEMENT     Discharge to post-acute care or home with appropriate resources Progressing        GASTROINTESTINAL - ADULT     Minimal or absence of nausea and/or vomiting Progressing     Maintains or returns to baseline bowel function Progressing     Maintains adequate nutritional intake Progressing     Establish and maintain optimal ostomy function Progressing        GENITOURINARY - ADULT     Maintains or returns to baseline urinary function Progressing     Absence of urinary retention Progressing     Urinary catheter remains patent Progressing        HEMATOLOGIC - ADULT     Maintains hematologic stability Progressing        METABOLIC, FLUID AND ELECTROLYTES - ADULT     Electrolytes maintained within normal limits Progressing     Fluid balance maintained Progressing     Glucose maintained within target range Progressing        MUSCULOSKELETAL - ADULT     Maintain or return mobility to safest level of function Progressing     Maintain proper alignment of affected body part Progressing        NEUROSENSORY - ADULT     Achieves stable or improved neurological status Progressing     Absence of seizures Progressing     Remains free of injury related to seizures activity Progressing     Achieves maximal functionality and self care Progressing        Nutrition/Hydration-ADULT     Nutrient/Hydration intake appropriate for improving, restoring or maintaining nutritional needs Progressing        Potential for Falls     Patient will remain free of falls Progressing        Prexisting or High Potential for Compromised Skin Integrity     Skin integrity is maintained or improved Progressing        RESPIRATORY - ADULT     Achieves optimal ventilation and oxygenation Progressing        SKIN/TISSUE INTEGRITY - ADULT     Skin integrity remains intact Progressing     Incision(s), wounds(s) or drain site(s) healing without S/S of infection Progressing     Oral mucous membranes remain intact Progressing

## 2018-08-26 PROBLEM — I50.33 ACUTE ON CHRONIC DIASTOLIC CONGESTIVE HEART FAILURE (HCC): Status: ACTIVE | Noted: 2018-08-23

## 2018-08-26 LAB
ANION GAP SERPL CALCULATED.3IONS-SCNC: 2 MMOL/L (ref 4–13)
BUN SERPL-MCNC: 32 MG/DL (ref 5–25)
CALCIUM SERPL-MCNC: 8.9 MG/DL (ref 8.3–10.1)
CHLORIDE SERPL-SCNC: 101 MMOL/L (ref 100–108)
CO2 SERPL-SCNC: 38 MMOL/L (ref 21–32)
CREAT SERPL-MCNC: 1.64 MG/DL (ref 0.6–1.3)
GFR SERPL CREATININE-BSD FRML MDRD: 32 ML/MIN/1.73SQ M
GLUCOSE SERPL-MCNC: 79 MG/DL (ref 65–140)
PLATELET # BLD AUTO: 181 THOUSANDS/UL (ref 149–390)
PMV BLD AUTO: 11.8 FL (ref 8.9–12.7)
POTASSIUM SERPL-SCNC: 3.6 MMOL/L (ref 3.5–5.3)
SODIUM SERPL-SCNC: 141 MMOL/L (ref 136–145)

## 2018-08-26 PROCEDURE — 80048 BASIC METABOLIC PNL TOTAL CA: CPT | Performed by: FAMILY MEDICINE

## 2018-08-26 PROCEDURE — 99232 SBSQ HOSP IP/OBS MODERATE 35: CPT | Performed by: INTERNAL MEDICINE

## 2018-08-26 PROCEDURE — 85049 AUTOMATED PLATELET COUNT: CPT | Performed by: FAMILY MEDICINE

## 2018-08-26 PROCEDURE — 99232 SBSQ HOSP IP/OBS MODERATE 35: CPT | Performed by: FAMILY MEDICINE

## 2018-08-26 RX ORDER — POTASSIUM CHLORIDE 20 MEQ/1
40 TABLET, EXTENDED RELEASE ORAL ONCE
Status: COMPLETED | OUTPATIENT
Start: 2018-08-26 | End: 2018-08-26

## 2018-08-26 RX ADMIN — DIVALPROEX SODIUM 500 MG: 500 TABLET, EXTENDED RELEASE ORAL at 21:43

## 2018-08-26 RX ADMIN — FUROSEMIDE 20 MG: 10 INJECTION, SOLUTION INTRAMUSCULAR; INTRAVENOUS at 09:10

## 2018-08-26 RX ADMIN — TAMSULOSIN HYDROCHLORIDE 0.8 MG: 0.4 CAPSULE ORAL at 21:43

## 2018-08-26 RX ADMIN — POTASSIUM CHLORIDE 40 MEQ: 1500 TABLET, EXTENDED RELEASE ORAL at 09:09

## 2018-08-26 RX ADMIN — LEVOTHYROXINE SODIUM 150 MCG: 150 TABLET ORAL at 08:22

## 2018-08-26 RX ADMIN — DEXTRAN 70 AND HYPROMELLOSE 2910 1 DROP: 1; 3 SOLUTION/ DROPS OPHTHALMIC at 09:22

## 2018-08-26 RX ADMIN — VITAMIN D, TAB 1000IU (100/BT) 1000 UNITS: 25 TAB at 08:22

## 2018-08-26 RX ADMIN — SENNOSIDES AND DOCUSATE SODIUM 1 TABLET: 8.6; 5 TABLET ORAL at 21:43

## 2018-08-26 RX ADMIN — NYSTATIN 1 APPLICATION: 100000 POWDER TOPICAL at 21:42

## 2018-08-26 RX ADMIN — NYSTATIN 1 APPLICATION: 100000 POWDER TOPICAL at 08:24

## 2018-08-26 RX ADMIN — RIVAROXABAN 15 MG: 15 TABLET, FILM COATED ORAL at 08:23

## 2018-08-26 RX ADMIN — NYSTATIN 1 APPLICATION: 100000 POWDER TOPICAL at 16:41

## 2018-08-26 RX ADMIN — DIVALPROEX SODIUM 250 MG: 500 TABLET, EXTENDED RELEASE ORAL at 08:23

## 2018-08-26 RX ADMIN — Medication 100 MG: at 08:22

## 2018-08-26 RX ADMIN — ATORVASTATIN CALCIUM 10 MG: 10 TABLET, FILM COATED ORAL at 08:23

## 2018-08-26 RX ADMIN — SENNOSIDES AND DOCUSATE SODIUM 1 TABLET: 8.6; 5 TABLET ORAL at 08:23

## 2018-08-26 RX ADMIN — SPIRONOLACTONE 25 MG: 25 TABLET, FILM COATED ORAL at 08:22

## 2018-08-26 RX ADMIN — FUROSEMIDE 20 MG: 10 INJECTION, SOLUTION INTRAMUSCULAR; INTRAVENOUS at 21:43

## 2018-08-26 NOTE — PROGRESS NOTES
[facility] Progress Note  Patient: Parrish Davis 70 y o  female   MRN: [id number]  PCP: Sammie Aranda DO  Unit/Bed#: [address] [place] [address] Encounter: [id number]  Date Of Visit: 08/26/18    Problem List:    Principal Problem:    Acute on chronic diastolic congestive heart failure ([facility] )  Active Problems:    Acute respiratory failure with hypoxia (HCC)    Toxic metabolic encephalopathy    Bilateral pleural effusion    Chronic kidney disease, stage 3    Thrombocytopenia (HCC)    Multiple sclerosis (HCC)    Seizure disorder (HCC)    Atrial fibrillation ([facility] )    Essential hypertension    Mixed hyperlipidemia    Schizophrenia ([facility] )    Hypothyroidism    Obstructive sleep apnea    Vitamin D deficiency      Assessment & Plan:    * Acute on chronic diastolic congestive heart failure (HCC)   Assessment & Plan    Echo showed EF of 51% with mild concentric hypertrophy with mild aortic and mitral valve regurgitation   Etiology of sudden pulmonary edema and CHF is not clear  Continue Aldactone, 20 mg of IV Lasix q 12 hours  Repeat chest x-ray in the a m  Monitor strict I& O and daily weight  Cardiology consult appreciated  She Will need nuclear stress test before discharge  chest x-ray from yesterday showed improving CHF        Acute respiratory failure with hypoxia (HCC)   Assessment & Plan    Continue supplemental oxygen by nasal cannula and titrate off as tolerated  Required BiPAP support initially and was in the ICU        Chronic kidney disease, stage 3   Assessment & Plan    Patient's baseline creatinine is about 1 6-1 8  Creatinine at baseline with IV diuretic  Repeat lab work in a m  Bilateral pleural effusion   Assessment & Plan    Likely due to congestive heart failure exacerbation  Continue IV Lasix as noted above        Toxic metabolic encephalopathy   Assessment & Plan    Patient's Depakote levels were normal   EEG showed mild background slowing as per Neurology    MRI of the brain showed chronic demyelinating disease but no acute intracranial abnormality        Vitamin D deficiency   Assessment & Plan    Continue vitamin D3 1000 units p o  daily        Obstructive sleep apnea   Assessment & Plan    Needs sleep study as outpatient  Hypothyroidism   Assessment & Plan    TSH noted to be elevated at 7  She is currently on regimen of levothyroxine 137 microgram with levothyroxine 150 mcg every other day        Schizophrenia [facility])   Assessment & Plan    Patient is on Abilify which is on hold currently  Mixed hyperlipidemia   Assessment & Plan    Continue statin  Essential hypertension   Assessment & Plan    Continue Aldactone        Atrial fibrillation [facility])   Assessment & Plan    Patient was noted to have low heart rate on telemetry and her amiodarone was discontinued  Continue Xarelto        Seizure disorder [facility])   Assessment & Plan    Continue Depakote  Multiple sclerosis ([facility] )   Assessment & Plan    Not actively being treated at this time  MRI brain showed findings of chronic demyelinating disease but no acute intracranial abnormality noted         Thrombocytopenia (HCC)   Assessment & Plan    History of chronic thrombocytopenia  Platelet count has improved and now within normal limits        Hypoglycemiaresolved as of 8/22/2018   Assessment & Plan    Resolved  Leukopeniaresolved as of 8/21/2018   Assessment & Plan    Resolved  VTE Pharmacologic Prophylaxis:   Pharmacologic: Rivaroxaban (Xarelto)  Mechanical VTE Prophylaxis in Place: Yes    Patient Centered Rounds: I have performed bedside rounds with nursing staff today  Discussions with Specialists or Other Care Team Provider: Yes    Education and Discussions with Family / Patient:Yes    Time Spent for Care: 35 min  More than 50% of total time spent on counseling and coordination of care as described above      Current Length of Stay: 8 day(s)    Current Patient Status: Inpatient     Discharge Plan: sTR    Code Status: Level 3 - DNAR and DNI    Certification Statement: The patient will continue to require additional inpatient hospital stay due to CHF exacerbation      Subjective:     Denies any shortness of breath, chest pain, abdominal pain    Objective:     Vitals:   Temp (24hrs), Av 3 °F (36 8 °C), Min:98 1 °F (36 7 °C), Max:98 4 °F (36 9 °C)    HR:  [69-94] 69  Resp:  [20] 20  BP: (122-124)/(60-65) 122/60  SpO2:  [96 %-98 %] 96 %  Body mass index is 40 43 kg/m²  Input and Output Summary (last 24 hours): Intake/Output Summary (Last 24 hours) at 18 1409  Last data filed at 18 0901   Gross per 24 hour   Intake              240 ml   Output              250 ml   Net              -10 ml       Physical Exam:     Physical Exam   Constitutional: She appears well-developed and well-nourished  No distress  HENT:   Head: Normocephalic and atraumatic  Eyes: Conjunctivae are normal  Right eye exhibits no discharge  Left eye exhibits no discharge  Neck: Neck supple  Cardiovascular:   Irregularly, irregular rate and rhythm   Pulmonary/Chest: Effort normal  No respiratory distress  She has no wheezes  She has no rales  Decreased breath sounds bilaterally   Abdominal: Soft  Bowel sounds are normal  She exhibits no distension  There is no tenderness  Musculoskeletal: She exhibits edema  Neurological: She is alert  No cranial nerve deficit  She exhibits abnormal muscle tone  Speech is slow  Oriented to place and self  Knew what month it is but did not know the year  Bilateral U E motor strength 2/5  Right L E 2/5 and left L E 1/5 motor strength   Skin: Skin is dry  She is not diaphoretic         Additional Data:     Labs:      Results from last 7 days  Lab Units 18  0623 18  0440 18  0533   WBC Thousand/uL  --  6 81 6 19   HEMOGLOBIN g/dL  --  10 8* 11 2*   HEMATOCRIT %  --  33 8* 36 0   PLATELETS Thousands/uL 181 106* 81*   NEUTROS PCT %  --   --  61   LYMPHS PCT % --   --  22   MONOS PCT %  --   --  15*   EOS PCT %  --   --  1       Results from last 7 days  Lab Units 08/26/18  0623   SODIUM mmol/L 141   POTASSIUM mmol/L 3 6   CHLORIDE mmol/L 101   CO2 mmol/L 38*   BUN mg/dL 32*   CREATININE mg/dL 1 64*   CALCIUM mg/dL 8 9   GLUCOSE RANDOM mg/dL 79           * I Have Reviewed All Lab Data Listed Above  * Additional Pertinent Lab Tests Reviewed: All Labs For Current Hospital Admission Reviewed    Imaging:  Ct Chest Abdomen Pelvis Wo Contrast    Result Date: 8/20/2018  Narrative: CT CHEST, ABDOMEN AND PELVIS WITHOUT IV CONTRAST INDICATION:   Sepsis  COMPARISON:  CT abdomen and pelvis 11/3/2011 TECHNIQUE: CT examination of the chest, abdomen and pelvis was performed without intravenous contrast   Axial, sagittal, and coronal 2D reformatted images were created from the source data and submitted for interpretation  Radiation dose length product (DLP) for this visit:  1744 26 mGy-cm   This examination, like all CT scans performed in the [facility], was performed utilizing techniques to minimize radiation dose exposure, including the use of iterative reconstruction and automated exposure control  Enteric contrast was administered  FINDINGS: CHEST LUNGS:  Bilateral lower lobe passive atelectasis noted with subtle groundglass upper lobe opacities likely inflammatory and/or infectious  PLEURA:  Moderate to large bilateral pleural effusions, right greater than left  HEART/GREAT VESSELS:  The heart is enlarged with no pericardial effusion  MEDIASTINUM AND DEBO:  No pathologic lymphadenopathy  There is reflux of contrast into the midesophagus  CHEST WALL AND LOWER NECK:   Unremarkable  ABDOMEN LIVER/BILIARY TREE:  Trace perihepatic ascites  No discrete mass on this nonenhanced study nor biliary ductal dilatation  GALLBLADDER:  Gallbladder is surgically absent  SPLEEN:  Unremarkable  PANCREAS:  Unremarkable  ADRENAL GLANDS:  Unremarkable   KIDNEYS/URETERS:  Chronic right renal atrophy  No hydronephrosis or perinephric collections  STOMACH AND BOWEL:  Constipation with no bowel obstruction  APPENDIX:  A normal appendix was visualized  ABDOMINOPELVIC CAVITY:  Minimal ascites predominantly in the upper abdomen and bilateral flanks  VESSELS:  Unremarkable for patient's age  PELVIS REPRODUCTIVE ORGANS:  Patient is status post hysterectomy  Prominent right ovary is a stable finding  URINARY BLADDER:  Catheterized, thick wall bladder noted with mild pericystic inflammation  Cystitis is not excluded  ABDOMINAL WALL/INGUINAL REGIONS:  3rd spacing of fluid concerning for anasarca in the lower abdomen and pelvis  OSSEOUS STRUCTURES:  No acute fracture or destructive osseous lesion  There is multilevel degenerative disc disease of the scoliotic spine  Impression: 1  Bilateral pleural effusions, right greater than left, with lower lobe compressive atelectasis  Scattered groundglass infiltrates likely infectious or inflammatory  2   Trace ascites with mild anasarca in the lower abdominal wall and pelvis  3   Constipation  Workstation performed: [id number]     X-ray Chest 1 View Portable    Result Date: 8/18/2018  Narrative: CHEST INDICATION:   resp distress  COMPARISON:  AP chest 4/4/2014  EXAM PERFORMED/VIEWS:  XR CHEST PORTABLE FINDINGS: Stable cardiac and mediastinal contours  Prominent central vasculature and bilateral airspace disease likely on the basis of pulmonary edema  Small bibasilar pleural effusions  Osseous structures appear within normal limits for patient age  Impression: Prominent central vasculature and bilateral airspace disease likely on the basis of pulmonary edema  Small bibasilar pleural effusions  Workstation performed: [id number]     Ct Head Without Contrast    Result Date: 8/18/2018  Narrative: CT BRAIN - WITHOUT CONTRAST INDICATION:   lethargic  Patient found unresponsive at nursing home  COMPARISON:  3/12/2014   TECHNIQUE:  CT examination of the brain was performed  In addition to axial images, coronal 2D reformatted images were created and submitted for interpretation  Radiation dose length product (DLP) for this visit:  1195 01 mGy-cm   This examination, like all CT scans performed in the [facility], was performed utilizing techniques to minimize radiation dose exposure, including the use of iterative reconstruction and automated exposure control  IMAGE QUALITY:  Diagnostic  FINDINGS: PARENCHYMA: Decreased attenuation is noted in periventricular and subcortical white matter demonstrating an appearance that is statistically most likely to represent mild microangiopathic change  No CT signs of acute infarction  No intracranial mass, mass effect or midline shift  No acute parenchymal hemorrhage  VENTRICLES AND EXTRA-AXIAL SPACES:  Normal for the patient's age  VISUALIZED ORBITS AND PARANASAL SINUSES:  Unremarkable  CALVARIUM AND EXTRACRANIAL SOFT TISSUES:  Normal      Impression: No acute intracranial abnormality  Microangiopathic changes  Workstation performed: [id number]     Mri Brain W Wo Contrast    Result Date: 8/24/2018  Narrative: MRI BRAIN WITH AND WITHOUT CONTRAST INDICATION: ams, r/o mets, h/o mets in thoracic spine  History of demyelinating disease  COMPARISON:  12/30/2016  TECHNIQUE: Sagittal T1, axial T2, axial FLAIR, axial T1, axial Evans, axial diffusion  Sagittal, axial T1 postcontrast   Axial bravo postcontrast with coronal reconstructions  IV Contrast:  5 mL of Gadobutrol injection (SINGLE-DOSE)  IMAGE QUALITY:   Examination limited by patient motion  FINDINGS: BRAIN PARENCHYMA:  Right matter hyperintensities are seen within the periventricular white matter bilaterally consistent with chronic demyelinating disease, possibly with superimposed chronic microangiopathic change  This appears grossly stable    Brainstem and cerebellum demonstrate normal signal   Diffusion imaging is unremarkable with no signs of acute ischemia  Normal corpus callosum and hypothalamus  Mild volume loss of the cerebral hemispheres, brainstem and cerebellum  Postcontrast imaging of the brain demonstrates no abnormal enhancement  VENTRICLES:  Stable mild ventricular enlargement with particular enlargement of the atria of the lateral ventricles  This is likely related to the degree of chronic white matter change and cerebral volume loss  SELLA AND PITUITARY GLAND:  Normal  ORBITS:  Normal  PARANASAL SINUSES:  Normal  VASCULATURE:  Evaluation of the major intracranial vasculature demonstrates appropriate flow voids  CALVARIUM AND SKULL BASE:  Normal  EXTRACRANIAL SOFT TISSUES:  Normal      Impression: Stable chronic white matter changes consistent with chronic demyelinating disease, possibly with superimposed chronic microangiopathic change  No acute ischemia, mass or hemorrhage  Workstation performed: [id number]     Mri Thoracic Spine Wo Contrast    Result Date: 8/7/2018  Narrative: MRI THORACIC SPINE WITHOUT CONTRAST INDICATION: 78-year-old female, back pain, MS COMPARISON:  12/30/2016 MRI; 2/15/2017 PET/CT TECHNIQUE:  Sagittal T1, sagittal T2, sagittal inversion recovery, axial T2,  axial 2D MERGE  IMAGE QUALITY: Diagnostic  FINDINGS: ALIGNMENT: Normal alignment of the thoracic spine  No compression fracture  No subluxation  No scoliosis  MARROW SIGNAL:  Development of abnormal marrow diffusely at T6, T7 and T8 vertebral bodies  Persistent abnormal marrow at T10  Findings are suspicious for progressive tumor involvement  THORACIC CORD: Normal signal within the thoracic cord  PARAVERTEBRAL SOFT TISSUES: Development of moderate right pleural effusion  Development of small to moderate left pleural effusion  THORACIC DEGENERATIVE CHANGE: Advanced multilevel degenerative spondylosis again noted     Impression: Development of abnormal marrow at T6, T7 and T8  Persistent abnormal marrow at T10  Findings suspicious for metastatic tumor  Correlation with repeat PET/CT may be warranted  Persistent advanced multilevel degenerative spondylosis Development of bilateral pleural effusions Workstation performed: [id number]     Xr Chest Portable Icu    Result Date: 8/22/2018  Narrative: CHEST INDICATION:   CHF  COMPARISON:  8/20/2018 EXAM PERFORMED/VIEWS:  XR CHEST PORTABLE ICU FINDINGS: Heart shadow is enlarged but unchanged from prior exam  Mild improvement in congestive changes with bibasilar densities and pleural effusions  Osseous structures appear within normal limits for patient age  Impression: Mild improvement in congestive changes with bibasilar densities and pleural effusions  Workstation performed: [id number]     Xr Chest Portable Icu    Result Date: 8/20/2018  Narrative: CHEST INDICATION:   CHF/pneumonia  COMPARISON:  8/19/2018 EXAM PERFORMED/VIEWS:  XR CHEST PORTABLE ICU FINDINGS: Heart shadow is enlarged but unchanged from prior exam  Bilateral pleural effusions are now evident with bibasilar airspace opacities and pulmonary congestion  No improvement has occurred  There is no pneumothorax Osseous structures appear within normal limits for patient age  Impression: Bilateral pleural effusions with bibasilar airspace disease and pulmonary congestion  There has been no interval improvement Workstation performed: [id number]     Xr Chest Portable Icu    Result Date: 8/20/2018  Narrative: CHEST INDICATION:   Pneumonia  COMPARISON:  8/18/2018 EXAM PERFORMED/VIEWS:  XR CHEST PORTABLE ICU FINDINGS: Heart shadow is enlarged but unchanged from prior exam  There is persistent right basilar airspace disease, similar, and linear areas of atelectasis on the left, similar  No pneumothorax  Pulmonary vascular congestion and suspected right effusion again noted  Osseous structures appear within normal limits for patient age       Impression: No significant interval change since 8/18/2018 Workstation performed: [id number]     Imaging Reports Reviewed by myself    Cultures:   Blood Culture:   Lab Results   Component Value Date    BLOODCX No Growth After 5 Days  08/18/2018    BLOODCX No Growth After 5 Days  08/18/2018     Urine Culture:   Lab Results   Component Value Date    URINECX 50,000-59,000 cfu/ml Morganella morganii (A) 08/18/2018    URINECX >100,000 cfu/ml Mixed Contaminants X6 09/28/2016    URINECX >100,000 cfu/ml Pseudomonas aeruginosa 06/23/2016     Sputum Culture: No components found for: SPUTUMCX  Wound Culture: No results found for: WOUNDCULT    Last 24 Hours Medication List:     Current Facility-Administered Medications:  acetaminophen 650 mg Oral Q6H PRN HAI Kay   albuterol 2 5 mg Nebulization Q4H PRN HAI Reilly   atorvastatin 10 mg Oral Daily Alyssia De Leon PA-C   cholecalciferol 1,000 Units Oral Daily Kath Ta MD   dextran 70-hypromellose 1 drop Both Eyes BID HAI Reilly   dextrose 25 mL Intravenous PRN Alyssia De Leon PA-C   divalproex sodium 250 mg Oral Daily Alyssia De Leon PA-C   divalproex sodium 500 mg Oral HS Alyssia De Leon PA-C   furosemide 20 mg Intravenous Q12H HAI Oro   levothyroxine 137 mcg Oral Every Other Day Kath Ta MD   levothyroxine 150 mcg Oral Every Other Day Kath Ta MD   nystatin 1 application Topical TID Alyssia De Leon PA-C   rivaroxaban 15 mg Oral Daily With Breakfast HAI Reilly   senna-docusate sodium 1 tablet Oral BID Glealiviada MehdisteHAI   spironolactone 25 mg Oral Daily HAI Perez   tamsulosin 0 8 mg Oral HS GleHAI Ulloa   thiamine 100 mg Oral Daily Alyssia De Leon PA-C        Today, Patient Was Seen By: Az Freed DO    ** Please Note: Dragon 360 Dictation voice to text software may have been used in the creation of this document   **

## 2018-08-27 ENCOUNTER — APPOINTMENT (INPATIENT)
Dept: RADIOLOGY | Facility: HOSPITAL | Age: 68
DRG: 871 | End: 2018-08-27
Payer: MEDICARE

## 2018-08-27 ENCOUNTER — APPOINTMENT (INPATIENT)
Dept: NON INVASIVE DIAGNOSTICS | Facility: HOSPITAL | Age: 68
DRG: 871 | End: 2018-08-27
Payer: MEDICARE

## 2018-08-27 LAB
ANION GAP SERPL CALCULATED.3IONS-SCNC: 3 MMOL/L (ref 4–13)
ATRIAL RATE: 90 BPM
BUN SERPL-MCNC: 31 MG/DL (ref 5–25)
CALCIUM SERPL-MCNC: 9.2 MG/DL (ref 8.3–10.1)
CHLORIDE SERPL-SCNC: 100 MMOL/L (ref 100–108)
CO2 SERPL-SCNC: 38 MMOL/L (ref 21–32)
CREAT SERPL-MCNC: 1.63 MG/DL (ref 0.6–1.3)
GFR SERPL CREATININE-BSD FRML MDRD: 32 ML/MIN/1.73SQ M
GLUCOSE SERPL-MCNC: 84 MG/DL (ref 65–140)
MAGNESIUM SERPL-MCNC: 1.9 MG/DL (ref 1.6–2.6)
POTASSIUM SERPL-SCNC: 3.8 MMOL/L (ref 3.5–5.3)
QRS AXIS: 8 DEGREES
QRSD INTERVAL: 100 MS
QT INTERVAL: 428 MS
QTC INTERVAL: 502 MS
SODIUM SERPL-SCNC: 141 MMOL/L (ref 136–145)
T WAVE AXIS: -47 DEGREES
VENTRICULAR RATE: 83 BPM

## 2018-08-27 PROCEDURE — 80048 BASIC METABOLIC PNL TOTAL CA: CPT | Performed by: FAMILY MEDICINE

## 2018-08-27 PROCEDURE — A9502 TC99M TETROFOSMIN: HCPCS

## 2018-08-27 PROCEDURE — 78452 HT MUSCLE IMAGE SPECT MULT: CPT

## 2018-08-27 PROCEDURE — 94760 N-INVAS EAR/PLS OXIMETRY 1: CPT

## 2018-08-27 PROCEDURE — 93010 ELECTROCARDIOGRAM REPORT: CPT | Performed by: INTERNAL MEDICINE

## 2018-08-27 PROCEDURE — 83735 ASSAY OF MAGNESIUM: CPT | Performed by: FAMILY MEDICINE

## 2018-08-27 PROCEDURE — 99232 SBSQ HOSP IP/OBS MODERATE 35: CPT | Performed by: INTERNAL MEDICINE

## 2018-08-27 PROCEDURE — 93017 CV STRESS TEST TRACING ONLY: CPT

## 2018-08-27 PROCEDURE — 94669 MECHANICAL CHEST WALL OSCILL: CPT

## 2018-08-27 PROCEDURE — 99232 SBSQ HOSP IP/OBS MODERATE 35: CPT | Performed by: FAMILY MEDICINE

## 2018-08-27 PROCEDURE — 93306 TTE W/DOPPLER COMPLETE: CPT | Performed by: INTERNAL MEDICINE

## 2018-08-27 PROCEDURE — 97535 SELF CARE MNGMENT TRAINING: CPT

## 2018-08-27 PROCEDURE — 71045 X-RAY EXAM CHEST 1 VIEW: CPT

## 2018-08-27 RX ORDER — LOSARTAN POTASSIUM 25 MG/1
25 TABLET ORAL DAILY
Status: DISCONTINUED | OUTPATIENT
Start: 2018-08-27 | End: 2018-08-28 | Stop reason: HOSPADM

## 2018-08-27 RX ORDER — FUROSEMIDE 40 MG/1
40 TABLET ORAL
Status: DISCONTINUED | OUTPATIENT
Start: 2018-08-27 | End: 2018-08-28

## 2018-08-27 RX ADMIN — NYSTATIN 1 APPLICATION: 100000 POWDER TOPICAL at 16:57

## 2018-08-27 RX ADMIN — NYSTATIN 1 APPLICATION: 100000 POWDER TOPICAL at 21:22

## 2018-08-27 RX ADMIN — VITAMIN D, TAB 1000IU (100/BT) 1000 UNITS: 25 TAB at 13:30

## 2018-08-27 RX ADMIN — LOSARTAN POTASSIUM 25 MG: 25 TABLET, FILM COATED ORAL at 13:40

## 2018-08-27 RX ADMIN — SPIRONOLACTONE 25 MG: 25 TABLET, FILM COATED ORAL at 13:29

## 2018-08-27 RX ADMIN — DIVALPROEX SODIUM 500 MG: 500 TABLET, EXTENDED RELEASE ORAL at 21:22

## 2018-08-27 RX ADMIN — NYSTATIN 1 APPLICATION: 100000 POWDER TOPICAL at 10:40

## 2018-08-27 RX ADMIN — FUROSEMIDE 40 MG: 40 TABLET ORAL at 16:56

## 2018-08-27 RX ADMIN — DEXTRAN 70 AND HYPROMELLOSE 2910 1 DROP: 1; 3 SOLUTION/ DROPS OPHTHALMIC at 13:41

## 2018-08-27 RX ADMIN — REGADENOSON 0.4 MG: 0.08 INJECTION, SOLUTION INTRAVENOUS at 11:30

## 2018-08-27 RX ADMIN — SENNOSIDES AND DOCUSATE SODIUM 1 TABLET: 8.6; 5 TABLET ORAL at 21:22

## 2018-08-27 RX ADMIN — LEVOTHYROXINE SODIUM 137 MCG: 112 TABLET ORAL at 08:58

## 2018-08-27 RX ADMIN — RIVAROXABAN 15 MG: 15 TABLET, FILM COATED ORAL at 13:29

## 2018-08-27 RX ADMIN — ATORVASTATIN CALCIUM 10 MG: 10 TABLET, FILM COATED ORAL at 13:29

## 2018-08-27 RX ADMIN — TAMSULOSIN HYDROCHLORIDE 0.8 MG: 0.4 CAPSULE ORAL at 21:22

## 2018-08-27 RX ADMIN — FUROSEMIDE 40 MG: 40 TABLET ORAL at 13:40

## 2018-08-27 RX ADMIN — DIVALPROEX SODIUM 250 MG: 500 TABLET, EXTENDED RELEASE ORAL at 13:29

## 2018-08-27 RX ADMIN — SENNOSIDES AND DOCUSATE SODIUM 1 TABLET: 8.6; 5 TABLET ORAL at 13:30

## 2018-08-27 RX ADMIN — Medication 100 MG: at 13:30

## 2018-08-27 NOTE — PLAN OF CARE
Problem: DISCHARGE PLANNING - CARE MANAGEMENT  Goal: Discharge to post-acute care or home with appropriate resources  INTERVENTIONS:  - Conduct assessment to determine patient/family and health care team treatment goals, and need for post-acute services based on payer coverage, community resources, and patient preferences, and barriers to discharge  - Address psychosocial, clinical, and financial barriers to discharge as identified in assessment in conjunction with the patient/family and health care team  - Arrange appropriate level of post-acute services according to patient's   needs and preference and payer coverage in collaboration with the physician and health care team  - Communicate with and update the patient/family, physician, and health care team regarding progress on the discharge plan  - Arrange appropriate transportation to post-acute venues  Return to B   Outcome: Progressing  Pt is a long term resident of [facility], [facility]  Plan is for pt to return to [facility] when discharged  Clinical update sent to [facility] admissions to prepare for return  Bed remains available at [facility] so pt can be transferred back whenever ready

## 2018-08-27 NOTE — PROGRESS NOTES
[facility] Internal Medicine Progress Note  Patient: Ilsa Vaughan 70 y o  female   MRN: [id number]  PCP: Edwin Dorsey DO  Unit/Bed#: [address] [place] [address] Encounter: [id number]  Date Of Visit: 08/27/18    Problem List:    Principal Problem:    Acute on chronic diastolic congestive heart failure ([facility] )  Active Problems:    Acute respiratory failure with hypoxia (HCC)    Toxic metabolic encephalopathy    Bilateral pleural effusion    Chronic kidney disease, stage 3    Thrombocytopenia (HCC)    Multiple sclerosis (HCC)    Seizure disorder (HCC)    Chronic atrial fibrillation (HCC)    Essential hypertension    Mixed hyperlipidemia    Schizophrenia ([facility] )    Hypothyroidism    Obstructive sleep apnea    Vitamin D deficiency      Assessment & Plan:    * Acute on chronic diastolic congestive heart failure ([facility] )   Assessment & Plan    Symptoms have improved  Now off IV Lasix and started on p o  Lasix  Monitor response overnight  Echo showed EF of 51% with mild concentric hypertrophy with mild aortic and mitral valve regurgitation   Etiology of sudden pulmonary edema and CHF is not clear  Continue Aldactone  Repeat chest x-ray today showed improving vascular congestion  Monitor strict I& O and daily weight  Cardiology consult appreciated  Patient had nuclear stress test done today which was normal        Acute respiratory failure with hypoxia (HCC)   Assessment & Plan    Continue supplemental oxygen by nasal cannula and titrate off as tolerated  Required BiPAP support initially and was in the ICU        Chronic kidney disease, stage 3   Assessment & Plan    Patient's baseline creatinine is about 1 6-1 8  Creatinine appears at baseline  Repeat lab work in a m          Bilateral pleural effusion   Assessment & Plan    Likely due to CHF exacerbation  Now off IV Lasix as noted above        Toxic metabolic encephalopathy   Assessment & Plan    Patient's Depakote levels were normal   EEG showed mild background slowing as per Neurology  MRI of the brain showed chronic demyelinating disease but no acute intracranial abnormality        Vitamin D deficiency   Assessment & Plan    Continue vitamin D3 1000 units p o  daily        Obstructive sleep apnea   Assessment & Plan    Need sleep study as outpatient  Hypothyroidism   Assessment & Plan    TSH noted to be elevated at 7  Continue levothyroxine 137 microgram alternating with levothyroxine 150 mcg every other day        Schizophrenia [facility])   Assessment & Plan    Patient is on Abilify which is on hold currently        Mixed hyperlipidemia   Assessment & Plan    Continue atorvastatin        Essential hypertension   Assessment & Plan    Continue Aldactone        Chronic atrial fibrillation [facility])   Assessment & Plan    Patient was noted to have low heart rate on telemetry and amiodarone was discontinued  Continue Xarelto        Seizure disorder [facility])   Assessment & Plan    Continue Depakote        Multiple sclerosis ([facility] )   Assessment & Plan    Not actively being treated at this time  MRI brain showed findings of chronic demyelinating disease but no acute intracranial abnormality noted         Thrombocytopenia (HCC)   Assessment & Plan    History of chronic thrombocytopenia  Platelet count improved on lab work yesterday and now within normal limits        Hypoglycemiaresolved as of 8/22/2018   Assessment & Plan    Resolved        Leukopeniaresolved as of 8/21/2018   Assessment & Plan    Resolved              VTE Pharmacologic Prophylaxis:   Pharmacologic: Rivaroxaban (Xarelto)  Mechanical VTE Prophylaxis in Place: Yes    Patient Centered Rounds: I have performed bedside rounds with nursing staff today  Discussions with Specialists or Other Care Team Provider: Yes    Education and Discussions with Family / Patient:Yes - left VM for sister    Time Spent for Care: 35 min  More than 50% of total time spent on counseling and coordination of care as described above      Current Length of Stay: 9 day(s)    Current Patient Status: Inpatient     Discharge Plan: sTR    Code Status: Level 3 - DNAR and DNI    Certification Statement: The patient will continue to require additional inpatient hospital stay due to CHF exacerbation      Subjective:     Patient states breathing is better  denies any chest pain, palpitations    Objective:     Vitals:   Temp (24hrs), Av 6 °F (37 °C), Min:98 5 °F (36 9 °C), Max:98 7 °F (37 1 °C)    HR:  [78-94] 94  Resp:  [18-19] 19  BP: (123-143)/(57-82) 142/73  SpO2:  [96 %-98 %] 96 %  Body mass index is 39 51 kg/m²  Input and Output Summary (last 24 hours): Intake/Output Summary (Last 24 hours) at 18 1837  Last data filed at 18 1760   Gross per 24 hour   Intake                0 ml   Output              550 ml   Net             -550 ml       Physical Exam:     Physical Exam   Constitutional: She appears well-developed and well-nourished  No distress  HENT:   Head: Normocephalic and atraumatic  Eyes: Conjunctivae and EOM are normal  Right eye exhibits no discharge  Left eye exhibits no discharge  Neck: Neck supple  Cardiovascular:   Irregularly, irregular rate and rhythm   Pulmonary/Chest: Effort normal  No respiratory distress  She has no wheezes  She has no rales  Decreased breath sounds bilaterally   Abdominal: Soft  Bowel sounds are normal  She exhibits no distension  There is no tenderness  Neurological: She is alert  No cranial nerve deficit  She exhibits abnormal muscle tone  right lower extremity 2/5 in left lower extremity 1/5 motor strength  Bilateral upper extremity motor strength 2/5  Speech is slow   Skin: Skin is dry  She is not diaphoretic         Additional Data:     Labs:      Results from last 7 days  Lab Units 18  0623 18  0440 18  0533   WBC Thousand/uL  --  6 81 6 19   HEMOGLOBIN g/dL  --  10 8* 11 2*   HEMATOCRIT %  --  33 8* 36 0   PLATELETS Thousands/uL 181 106* 81*   NEUTROS PCT %  -- --  61   LYMPHS PCT %  --   --  22   MONOS PCT %  --   --  15*   EOS PCT %  --   --  1       Results from last 7 days  Lab Units 08/27/18  0542   SODIUM mmol/L 141   POTASSIUM mmol/L 3 8   CHLORIDE mmol/L 100   CO2 mmol/L 38*   BUN mg/dL 31*   CREATININE mg/dL 1 63*   CALCIUM mg/dL 9 2           * I Have Reviewed All Lab Data Listed Above  * Additional Pertinent Lab Tests Reviewed: All Labs For Current Hospital Admission Reviewed    Imaging:  Ct Chest Abdomen Pelvis Wo Contrast    Result Date: 8/20/2018  Narrative: CT CHEST, ABDOMEN AND PELVIS WITHOUT IV CONTRAST INDICATION:   Sepsis  COMPARISON:  CT abdomen and pelvis 11/3/2011 TECHNIQUE: CT examination of the chest, abdomen and pelvis was performed without intravenous contrast   Axial, sagittal, and coronal 2D reformatted images were created from the source data and submitted for interpretation  Radiation dose length product (DLP) for this visit:  1744 26 mGy-cm   This examination, like all CT scans performed in the [facility], was performed utilizing techniques to minimize radiation dose exposure, including the use of iterative reconstruction and automated exposure control  Enteric contrast was administered  FINDINGS: CHEST LUNGS:  Bilateral lower lobe passive atelectasis noted with subtle groundglass upper lobe opacities likely inflammatory and/or infectious  PLEURA:  Moderate to large bilateral pleural effusions, right greater than left  HEART/GREAT VESSELS:  The heart is enlarged with no pericardial effusion  MEDIASTINUM AND DEBO:  No pathologic lymphadenopathy  There is reflux of contrast into the midesophagus  CHEST WALL AND LOWER NECK:   Unremarkable  ABDOMEN LIVER/BILIARY TREE:  Trace perihepatic ascites  No discrete mass on this nonenhanced study nor biliary ductal dilatation  GALLBLADDER:  Gallbladder is surgically absent  SPLEEN:  Unremarkable  PANCREAS:  Unremarkable  ADRENAL GLANDS:  Unremarkable   KIDNEYS/URETERS:  Chronic right renal atrophy  No hydronephrosis or perinephric collections  STOMACH AND BOWEL:  Constipation with no bowel obstruction  APPENDIX:  A normal appendix was visualized  ABDOMINOPELVIC CAVITY:  Minimal ascites predominantly in the upper abdomen and bilateral flanks  VESSELS:  Unremarkable for patient's age  PELVIS REPRODUCTIVE ORGANS:  Patient is status post hysterectomy  Prominent right ovary is a stable finding  URINARY BLADDER:  Catheterized, thick wall bladder noted with mild pericystic inflammation  Cystitis is not excluded  ABDOMINAL WALL/INGUINAL REGIONS:  3rd spacing of fluid concerning for anasarca in the lower abdomen and pelvis  OSSEOUS STRUCTURES:  No acute fracture or destructive osseous lesion  There is multilevel degenerative disc disease of the scoliotic spine  Impression: 1  Bilateral pleural effusions, right greater than left, with lower lobe compressive atelectasis  Scattered groundglass infiltrates likely infectious or inflammatory  2   Trace ascites with mild anasarca in the lower abdominal wall and pelvis  3   Constipation  Workstation performed: [id number]     X-ray Chest 1 View Portable    Result Date: 8/18/2018  Narrative: CHEST INDICATION:   resp distress  COMPARISON:  AP chest 4/4/2014  EXAM PERFORMED/VIEWS:  XR CHEST PORTABLE FINDINGS: Stable cardiac and mediastinal contours  Prominent central vasculature and bilateral airspace disease likely on the basis of pulmonary edema  Small bibasilar pleural effusions  Osseous structures appear within normal limits for patient age  Impression: Prominent central vasculature and bilateral airspace disease likely on the basis of pulmonary edema  Small bibasilar pleural effusions  Workstation performed: [id number]     Ct Head Without Contrast    Result Date: 8/18/2018  Narrative: CT BRAIN - WITHOUT CONTRAST INDICATION:   lethargic  Patient found unresponsive at nursing home  COMPARISON:  3/12/2014   TECHNIQUE:  CT examination of the brain was performed  In addition to axial images, coronal 2D reformatted images were created and submitted for interpretation  Radiation dose length product (DLP) for this visit:  1195 01 mGy-cm   This examination, like all CT scans performed in the [facility], was performed utilizing techniques to minimize radiation dose exposure, including the use of iterative reconstruction and automated exposure control  IMAGE QUALITY:  Diagnostic  FINDINGS: PARENCHYMA: Decreased attenuation is noted in periventricular and subcortical white matter demonstrating an appearance that is statistically most likely to represent mild microangiopathic change  No CT signs of acute infarction  No intracranial mass, mass effect or midline shift  No acute parenchymal hemorrhage  VENTRICLES AND EXTRA-AXIAL SPACES:  Normal for the patient's age  VISUALIZED ORBITS AND PARANASAL SINUSES:  Unremarkable  CALVARIUM AND EXTRACRANIAL SOFT TISSUES:  Normal      Impression: No acute intracranial abnormality  Microangiopathic changes  Workstation performed: [id number]     Mri Brain W Wo Contrast    Result Date: 8/24/2018  Narrative: MRI BRAIN WITH AND WITHOUT CONTRAST INDICATION: ams, r/o mets, h/o mets in thoracic spine  History of demyelinating disease  COMPARISON:  12/30/2016  TECHNIQUE: Sagittal T1, axial T2, axial FLAIR, axial T1, axial [place], axial diffusion  Sagittal, axial T1 postcontrast   Axial bravo postcontrast with coronal reconstructions  IV Contrast:  5 mL of Gadobutrol injection (SINGLE-DOSE)  IMAGE QUALITY:   Examination limited by patient motion  FINDINGS: BRAIN PARENCHYMA:  Right matter hyperintensities are seen within the periventricular white matter bilaterally consistent with chronic demyelinating disease, possibly with superimposed chronic microangiopathic change  This appears grossly stable    Brainstem and cerebellum demonstrate normal signal   Diffusion imaging is unremarkable with no signs of acute ischemia  Normal corpus callosum and hypothalamus  Mild volume loss of the cerebral hemispheres, brainstem and cerebellum  Postcontrast imaging of the brain demonstrates no abnormal enhancement  VENTRICLES:  Stable mild ventricular enlargement with particular enlargement of the atria of the lateral ventricles  This is likely related to the degree of chronic white matter change and cerebral volume loss  SELLA AND PITUITARY GLAND:  Normal  ORBITS:  Normal  PARANASAL SINUSES:  Normal  VASCULATURE:  Evaluation of the major intracranial vasculature demonstrates appropriate flow voids  CALVARIUM AND SKULL BASE:  Normal  EXTRACRANIAL SOFT TISSUES:  Normal      Impression: Stable chronic white matter changes consistent with chronic demyelinating disease, possibly with superimposed chronic microangiopathic change  No acute ischemia, mass or hemorrhage  Workstation performed: [id number]     Mri Thoracic Spine Wo Contrast    Result Date: 8/7/2018  Narrative: MRI THORACIC SPINE WITHOUT CONTRAST INDICATION: 59-year-old female, back pain, MS COMPARISON:  12/30/2016 MRI; 2/15/2017 PET/CT TECHNIQUE:  Sagittal T1, sagittal T2, sagittal inversion recovery, axial T2,  axial 2D MERGE  IMAGE QUALITY: Diagnostic  FINDINGS: ALIGNMENT: Normal alignment of the thoracic spine  No compression fracture  No subluxation  No scoliosis  MARROW SIGNAL:  Development of abnormal marrow diffusely at T6, T7 and T8 vertebral bodies  Persistent abnormal marrow at T10  Findings are suspicious for progressive tumor involvement  THORACIC CORD: Normal signal within the thoracic cord  PARAVERTEBRAL SOFT TISSUES: Development of moderate right pleural effusion  Development of small to moderate left pleural effusion  THORACIC DEGENERATIVE CHANGE: Advanced multilevel degenerative spondylosis again noted     Impression: Development of abnormal marrow at T6, T7 and T8  Persistent abnormal marrow at T10  Findings suspicious for metastatic tumor  Correlation with repeat PET/CT may be warranted  Persistent advanced multilevel degenerative spondylosis Development of bilateral pleural effusions Workstation performed: [id number]     Xr Chest Portable Icu    Result Date: 8/22/2018  Narrative: CHEST INDICATION:   CHF  COMPARISON:  8/20/2018 EXAM PERFORMED/VIEWS:  XR CHEST PORTABLE ICU FINDINGS: Heart shadow is enlarged but unchanged from prior exam  Mild improvement in congestive changes with bibasilar densities and pleural effusions  Osseous structures appear within normal limits for patient age  Impression: Mild improvement in congestive changes with bibasilar densities and pleural effusions  Workstation performed: [id number]     Xr Chest Portable Icu    Result Date: 8/20/2018  Narrative: CHEST INDICATION:   CHF/pneumonia  COMPARISON:  8/19/2018 EXAM PERFORMED/VIEWS:  XR CHEST PORTABLE ICU FINDINGS: Heart shadow is enlarged but unchanged from prior exam  Bilateral pleural effusions are now evident with bibasilar airspace opacities and pulmonary congestion  No improvement has occurred  There is no pneumothorax Osseous structures appear within normal limits for patient age  Impression: Bilateral pleural effusions with bibasilar airspace disease and pulmonary congestion  There has been no interval improvement Workstation performed: [id number]     Xr Chest Portable Icu    Result Date: 8/20/2018  Narrative: CHEST INDICATION:   Pneumonia  COMPARISON:  8/18/2018 EXAM PERFORMED/VIEWS:  XR CHEST PORTABLE ICU FINDINGS: Heart shadow is enlarged but unchanged from prior exam  There is persistent right basilar airspace disease, similar, and linear areas of atelectasis on the left, similar  No pneumothorax  Pulmonary vascular congestion and suspected right effusion again noted  Osseous structures appear within normal limits for patient age       Impression: No significant interval change since 8/18/2018 Workstation performed: [id number]     Imaging Reports Reviewed by myself    Cultures:   Blood Culture:   Lab Results   Component Value Date    BLOODCX No Growth After 5 Days  08/18/2018    BLOODCX No Growth After 5 Days  08/18/2018     Urine Culture:   Lab Results   Component Value Date    URINECX 50,000-59,000 cfu/ml Morganella morganii (A) 08/18/2018    URINECX >100,000 cfu/ml Mixed Contaminants X6 09/28/2016    URINECX >100,000 cfu/ml Pseudomonas aeruginosa 06/23/2016     Sputum Culture: No components found for: SPUTUMCX  Wound Culture: No results found for: WOUNDCULT    Last 24 Hours Medication List:     Current Facility-Administered Medications:  acetaminophen 650 mg Oral Q6H PRN HAI Sparks   albuterol 2 5 mg Nebulization Q4H PRN Willy Beverage, HAI   atorvastatin 10 mg Oral Daily Estela Resendez, PA-C   cholecalciferol 1,000 Units Oral Daily Michael Robledo MD   dextran 70-hypromellose 1 drop Both Eyes BID BMEYE, HAI   dextrose 25 mL Intravenous PRN MAHSA Hadley-C   divalproex sodium 250 mg Oral Daily Uchey Busrenu, PA-C   divalproex sodium 500 mg Oral HS Estela Resendez, PA-C   furosemide 40 mg Oral BID (diuretic) HAI Toure   levothyroxine 137 mcg Oral Every Other Day Michael Robledo MD   levothyroxine 150 mcg Oral Every Other Day Michael Robledo MD   losartan 25 mg Oral Daily Roseann Caro, HAI   nystatin 1 application Topical TID Estela Resendez PA-C   rivaroxaban 15 mg Oral Daily With Breakfast BMEYE, HAI   senna-docusate sodium 1 tablet Oral BID BMEYE, HAI   spironolactone 25 mg Oral Daily Roseann Caro, HAI   tamsulosin 0 8 mg Oral HS BMEYE, HAI   thiamine 100 mg Oral Daily Estela Resendez PA-C        Today, Patient Was Seen By: Keely Cunningham DO    ** Please Note: Dragon 360 Dictation voice to text software may have been used in the creation of this document   **

## 2018-08-27 NOTE — SOCIAL WORK
SW following to monitor needs and assist with planning  Pt is a long term resident of [facility], [facility]  Plan is for pt to return to [facility] when discharged  Clinical update sent to [facility] to prepare for return  SW met with pt and pt's sister, Antony Go, to review plan  Bed remains available at [facility] so pt can be transferred back whenever ready  Dr Sarah Hale also aware of plan  SW will follow to monitor progress and assist with transfer back when ordered

## 2018-08-27 NOTE — CASE MANAGEMENT
Continued Stay Review    Date: 8/26/18    Vital Signs: /57 (BP Location: Right arm)   Pulse 87   Temp 98 6 °F (37 °C) (Oral)   Resp 18   Ht 5' 5" (1 651 m)   Wt 108 kg (237 lb 6 4 oz)   SpO2 97%   BMI 39 51 kg/m²     Medications:   Scheduled Meds:   Current Facility-Administered Medications:  acetaminophen 650 mg Oral Q6H PRN HAI Lewis   albuterol 2 5 mg Nebulization Q4H PRN HAI Black   atorvastatin 10 mg Oral Daily Kalyn Bustos PA-C   cholecalciferol 1,000 Units Oral Daily Gm Lua MD   dextran 70-hypromellose 1 drop Both Eyes BID HAI Black   dextrose 25 mL Intravenous PRN Kalyn Bustos PA-C   divalproex sodium 250 mg Oral Daily Kalyn Bustos PA-C   divalproex sodium 500 mg Oral HS Kalyn Bustos PA-C   furosemide 40 mg Oral BID (diuretic) David HAI Petty   levothyroxine 137 mcg Oral Every Other Day Tiffany Millan MD   levothyroxine 150 mcg Oral Every Other Day Tiffany Millan MD   losartan 25 mg Oral Daily David HAI Petty   nystatin 1 application Topical TID Kalyn Bustos PA-C   rivaroxaban 15 mg Oral Daily With Breakfast HAI Black   senna-docusate sodium 1 tablet Oral BID HAI Black   spironolactone 25 mg Oral Daily David HAI Petty   tamsulosin 0 8 mg Oral HS HAI Black   thiamine 100 mg Oral Daily Kalyn Bustos PA-C     Continuous Infusions:    PRN Meds:   acetaminophen    albuterol    dextrose    Abnormal Labs/Diagnostic Results:   CO2 mmol/L 38*   BUN mg/dL 32*   CREATININE mg/dL 1 64*       Age/Sex: 70 y o  female     * Acute on chronic diastolic congestive heart failure (HCC)   Assessment & Plan     Echo showed EF of 51% with mild concentric hypertrophy with mild aortic and mitral valve regurgitation   Etiology of sudden pulmonary edema and CHF is not clear  Continue Aldactone, 20 mg of IV Lasix q 12 hours  Repeat chest x-ray in the a m    Monitor strict I& O and daily weight  Cardiology consult appreciated  She Will need nuclear stress test before discharge  chest x-ray from yesterday showed improving CHF          Acute respiratory failure with hypoxia (HCC)   Assessment & Plan     Continue supplemental oxygen by nasal cannula and titrate off as tolerated  Required BiPAP support initially and was in the ICU          Chronic kidney disease, stage 3   Assessment & Plan     Patient's baseline creatinine is about 1 6-1 8  Creatinine at baseline with IV diuretic  Repeat lab work in a m           Bilateral pleural effusion   Assessment & Plan     Likely due to congestive heart failure exacerbation  Continue IV Lasix as noted above          Toxic metabolic encephalopathy   Assessment & Plan     Patient's Depakote levels were normal   EEG showed mild background slowing as per Neurology  MRI of the brain showed chronic demyelinating disease but no acute intracranial abnormality          Vitamin D deficiency   Assessment & Plan     Continue vitamin D3 1000 units p o  daily          Obstructive sleep apnea   Assessment & Plan     Needs sleep study as outpatient           Hypothyroidism   Assessment & Plan     TSH noted to be elevated at 7  She is currently on regimen of levothyroxine 137 microgram with levothyroxine 150 mcg every other day          Schizophrenia ([facility] )   Assessment & Plan     Patient is on Abilify which is on hold currently           Mixed hyperlipidemia   Assessment & Plan     Continue statin           Essential hypertension   Assessment & Plan     Continue Aldactone          Atrial fibrillation [facility])   Assessment & Plan     Patient was noted to have low heart rate on telemetry and her amiodarone was discontinued    Continue Xarelto          Seizure disorder [facility])   Assessment & Plan     Continue Depakote           Multiple sclerosis ([facility] )   Assessment & Plan     Not actively being treated at this time  MRI brain showed findings of chronic demyelinating disease but no acute intracranial abnormality noted           Thrombocytopenia (HCC)   Assessment & Plan     History of chronic thrombocytopenia  Platelet count has improved and now within normal limits      Certification Statement: The patient will continue to require additional inpatient hospital stay due to CHF exacerbation     CARDIOLOGY  PLAN:     1  Lexiscan Myoview stress study in a m  discussed with Dr Isela Gitelman and with patient    2   Concur with present management

## 2018-08-27 NOTE — PROGRESS NOTES
Progress Note - Cardiology   Maximino Hardin 70 y o  female MRN: [id number]  Unit/Bed#: [address] Encounter: [id number]    Assessment/Plan:  1  Acute on chronic diastolic heart failure:  Patient has diuresed 12 L of fluid since admission to hospital   Will transition to oral diuretics and continue to monitor her response  Will add low-dose ARB in addition to or oral Aldactone and Lasix  2   Essential hypertension:  Blood pressure is varying between systolic 888 to 669X  Will add low-dose Cozaar and continue monitor labs and vital signs  3   Chronic atrial fibrillation:  Continue her Xarelto and monitor heart rate  4   Chronic morbid obesity    5  Hypothyroidism continue her Synthroid per primary team    6  Mixed dyslipidemia:  Continue statin        Subjective/Objective   Patient seen examined  She is more awake today than earlier in the week  Conversing without any difficulty  She denies any chest pain, pressure or palpitations  She states that her breathing has improved  Her lower extremity edema has greatly improved  She has diuresed out approximately 12 L of fluid and is down at 10 kg  She appears as if she is reaching euvolemic  Will trial transitioning her to oral Lasix and continue to monitor her I&O and response      Vitals: /57 (BP Location: Right arm)   Pulse 87   Temp 98 6 °F (37 °C) (Oral)   Resp 18   Ht 5' 5" (1 651 m)   Wt 108 kg (237 lb 6 4 oz)   SpO2 97%   BMI 39 51 kg/m²   Vitals:    08/24/18 0537 08/27/18 0543   Weight: 110 kg (242 lb 15 2 oz) 108 kg (237 lb 6 4 oz)     Orthostatic Blood Pressures      Most Recent Value   Blood Pressure  123/57 filed at 08/27/2018 0747   Patient Position - Orthostatic VS  Lying filed at 08/27/2018 0747            Intake/Output Summary (Last 24 hours) at 08/27/18 0907  Last data filed at 08/27/18 0552   Gross per 24 hour   Intake              440 ml   Output              950 ml   Net             -510 ml       Invasive Devices Peripheral Intravenous Line            Long-Dwell Peripheral IV (Midline) 60/57/59 Left Cephalic 4 days                Review of Systems: History obtained from chart review and the patient  General ROS: negative  Respiratory ROS: no cough, shortness of breath, or wheezing  Cardiovascular ROS: no chest pain or dyspnea on exertion  Gastrointestinal ROS: no abdominal pain, change in bowel habits, or black or bloody stools    Physical Exam: /57 (BP Location: Right arm)   Pulse 87   Temp 98 6 °F (37 °C) (Oral)   Resp 18   Ht 5' 5" (1 651 m)   Wt 108 kg (237 lb 6 4 oz)   SpO2 97%   BMI 39 51 kg/m²   General appearance: alert and oriented, in no acute distress  Head: Normocephalic, without obvious abnormality, atraumatic  Neck: no adenopathy, no carotid bruit, no JVD, supple, symmetrical, trachea midline and thyroid not enlarged, symmetric, no tenderness/mass/nodules  Lungs: clear to auscultation bilaterally and Decreased bases, no wheezing or rhonchi is appreciated  Heart: regular rate and rhythm, S1, S2 normal, no murmur, click, rub or gallop  Abdomen: soft, non-tender; bowel sounds normal; no masses,  no organomegaly  Extremities: extremities normal, warm and well-perfused; no cyanosis, clubbing, or edema  Skin: Skin color, texture, turgor normal  No rashes or lesions    Lab Results:   I have personally reviewed pertinent lab results      CBC with diff:   Results from last 7 days  Lab Units 08/26/18  0623 08/24/18  0440   WBC Thousand/uL  --  6 81   RBC Million/uL  --  3 52*   HEMOGLOBIN g/dL  --  10 8*   HEMATOCRIT %  --  33 8*   MCV fL  --  96   MCH pg  --  30 7   MCHC g/dL  --  32 0   RDW %  --  15 4*   MPV fL 11 8 12 6   PLATELETS Thousands/uL 181 106*     CMP:   Results from last 7 days  Lab Units 08/27/18  0542   SODIUM mmol/L 141   POTASSIUM mmol/L 3 8   CHLORIDE mmol/L 100   CO2 mmol/L 38*   BUN mg/dL 31*   CREATININE mg/dL 1 63*   CALCIUM mg/dL 9 2   EGFR ml/min/1 73sq m 32     Imaging: I have personally reviewed pertinent reports      VTE Pharmacologic Prophylaxis:   On Xarelto  VTE Mechanical Prophylaxis: sequential compression device

## 2018-08-27 NOTE — OCCUPATIONAL THERAPY NOTE
OT TREATMENT       08/27/18 1516   Restrictions/Precautions   Other Precautions Chair Alarm; Bed Alarm; Fall Risk   Pain Assessment   Pain Assessment No/denies pain   ADL   Eating Assistance 5  Supervision/Setup   Eating Deficit Setup   Eating Comments pt able to feed self applesauce using right hand for use of spoon and applesauce on table  Patient slightly used left UE to keep applesauce on table    Grooming Assistance 4  Minimal Assistance   Grooming Deficit Brushing hair;Wash/dry hands; Wash/dry face;Setup   Assessment   Assessment Patient OOB in recliner  Patient required min assist for setup for grooming and feeding today  Patient is very conversational and alert today  Plan   Treatment Interventions ADL retraining;Functional transfer training; Endurance training;UE strengthening/ROM; Patient/family training;Equipment evaluation/education; Activityengagement; Energy conservation   Treatment Day 3   Recommendation   OT Discharge Recommendation (return to SNF with PT/OT)   Licensure   [place] License Number  Cassidy Hopson Weston Skyler 86 OTR/L [id number]

## 2018-08-27 NOTE — SPEECH THERAPY NOTE
Patient is out at stress test  Plan to reattempt visit within 24-36 hours      GENNARO Mathew , [id number] [facility]  Speech Language Pathologist   [id number]

## 2018-08-27 NOTE — PROGRESS NOTES
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  7  Chronic kidney disease, stage III with baseline creatinine currently reached  8  History of seizure disorder  9  History of schizophrenia  10  Mixed dyslipidemia  11  Vitamin-D deficiency  12  Improving thrombocytopenia, chronic      PLAN:    1  Lexiscan Myoview stress study in a m  discussed with Dr Sarah Hale and with patient  2   Concur with present management  HPI:    Ms Chico Varner denies new cardiopulmonary or  medical symptoms  Objective   Her chest x-ray yesterday showed improvement in CHF and pleural effusions  Her EKG showed controlled atrial fibrillation with isolated PVCs and no other acute changes  Physical Exam:   /82 (BP Location: Right arm)   Pulse 78   Temp 98 7 °F (37 1 °C) (Oral)   Resp 18   Ht 5' 5" (1 651 m)   Wt 110 kg (242 lb 15 2 oz)   SpO2 98%   BMI 40 43 kg/m²   Body mass index is 40 43 kg/m²      General Appearance:  Alert, cooperative, no distress, appears stated age and is morbidly obese   Head:  Normocephalic, without obvious abnormality, atraumatic   Eyes:  PERRL, conjunctiva/corneas clear, EOM's intact,   both eyes   Ears:  Normal TM's and external ear canals, both ears   Nose: Nares normal, septum midline, mucosa normal, no drainage or sinus tenderness   Throat: Lips, mucosa, and tongue normal; teeth and gums normal   Neck: Supple, symmetrical, trachea midline, no adenopathy, thyroid: not enlarged, symmetric, no tenderness/mass/nodules, no carotid bruit or JVD Back:   Symmetric, no curvature, ROM normal, no CVA tenderness   Lungs:   Clear to auscultation bilaterally, respirations unlabored   Chest Wall:  No tenderness or deformity   Heart:  Irregularly irregular cardiac rhythm, S1, S2 normal, no murmur, rub or gallop   Abdomen:   Soft, non-tender, bowel sounds active all four quadrants,  no masses, no organomegaly with marked obesity   Extremities: Extremities normal, atraumatic, no cyanosis or edema   Pulses: 2+ and symmetric   Skin: Skin showed normal color, texture, turgor and no rashes or lesions   Lymph nodes: Cervical, supraclavicular, and axillary nodes normal   Neurologic: Normal         Cardiographics  ECG 08/25/2018:              Controlled atrial fibrillation with average ventricular rate 83 bpm  Isolated PVCs  Diffuse nonspecific ST-T abnormalities  IVCD  New PVCs compared to 08/18/2018 with no other changes    Echocardiogram             Not applicable    Imaging  Chest X-Ray  08/25/2018:          Improving CHF and pleural effusions    Lab Review   Recent Results (from the past 24 hour(s))   Basic metabolic panel    Collection Time: 08/26/18  6:23 AM   Result Value Ref Range    Sodium 141 136 - 145 mmol/L    Potassium 3 6 3 5 - 5 3 mmol/L    Chloride 101 100 - 108 mmol/L    CO2 38 (H) 21 - 32 mmol/L    Anion Gap 2 (L) 4 - 13 mmol/L    BUN 32 (H) 5 - 25 mg/dL    Creatinine 1 64 (H) 0 60 - 1 30 mg/dL    Glucose 79 65 - 140 mg/dL    Calcium 8 9 8 3 - 10 1 mg/dL    eGFR 32 ml/min/1 73sq m   Platelet count    Collection Time: 08/26/18  6:23 AM   Result Value Ref Range    Platelets 347 492 - 462 Thousands/uL    MPV 11 8 8 9 - 12 7 fL     Current Facility-Administered Medications   Medication Dose Route Frequency Provider Last Rate Last Dose    acetaminophen (TYLENOL) tablet 650 mg  650 mg Oral Q6H PRN HAI Mojica        albuterol inhalation solution 2 5 mg  2 5 mg Nebulization Q4H PRN HAI Ferrera   2 5 mg at 08/25/18 0734    atorvastatin (LIPITOR) tablet 10 mg  10 mg Oral Daily Shon Loera PA-C   10 mg at 08/26/18 5394    cholecalciferol (VITAMIN D3) tablet 1,000 Units  1,000 Units Oral Daily Asmita Ospina MD   1,000 Units at 08/26/18 0822    dextran 70-hypromellose (GENTEAL TEARS) 0 1-0 3 % ophthalmic solution 1 drop  1 drop Both Eyes BID HAI Silva   1 drop at 08/26/18 5317    dextrose 50 % IV solution 25 mL  25 mL Intravenous PRN Shon Loera PA-C   25 mL at 08/19/18 1911    divalproex sodium (DEPAKOTE ER) 24 hr tablet 250 mg  250 mg Oral Daily Shon Loera PA-C   250 mg at 08/26/18 2632    divalproex sodium (DEPAKOTE ER) 24 hr tablet 500 mg  500 mg Oral HS Shon Loera PA-C   500 mg at 08/25/18 2136    furosemide (LASIX) injection 20 mg  20 mg Intravenous Q12H HAI Franco   20 mg at 08/26/18 4934    levothyroxine tablet 137 mcg  137 mcg Oral Every Other Day Asmita Ospina MD   137 mcg at 08/25/18 0552    levothyroxine tablet 150 mcg  150 mcg Oral Every Other Day Asmita Ospina MD   150 mcg at 08/26/18 9431    nystatin (MYCOSTATIN) powder 1 application  1 application Topical TID Shon Loera PA-C   1 application at 68/78/41 1641    rivaroxaban (XARELTO) tablet 15 mg  15 mg Oral Daily With Breakfast HAI Silva   15 mg at 08/26/18 8441    senna-docusate sodium (SENOKOT S) 8 6-50 mg per tablet 1 tablet  1 tablet Oral BID HAI Silva   1 tablet at 08/26/18 4322    spironolactone (ALDACTONE) tablet 25 mg  25 mg Oral Daily HAI Bahena   25 mg at 08/26/18 6654    tamsulosin (FLOMAX) capsule 0 8 mg  0 8 mg Oral HS HAI Silva   0 8 mg at 08/25/18 2136    thiamine (VITAMIN B1) tablet 100 mg  100 mg Oral Daily Shon Loera PA-C   100 mg at 08/26/18 4663

## 2018-08-28 VITALS
SYSTOLIC BLOOD PRESSURE: 123 MMHG | OXYGEN SATURATION: 92 % | HEART RATE: 80 BPM | RESPIRATION RATE: 20 BRPM | DIASTOLIC BLOOD PRESSURE: 56 MMHG | HEIGHT: 65 IN | WEIGHT: 229.94 LBS | BODY MASS INDEX: 38.31 KG/M2 | TEMPERATURE: 97.3 F

## 2018-08-28 PROBLEM — G92.8 TOXIC METABOLIC ENCEPHALOPATHY: Status: RESOLVED | Noted: 2018-08-18 | Resolved: 2018-08-28

## 2018-08-28 PROBLEM — I50.33 ACUTE ON CHRONIC DIASTOLIC CONGESTIVE HEART FAILURE (HCC): Status: RESOLVED | Noted: 2018-08-23 | Resolved: 2018-08-28

## 2018-08-28 PROBLEM — J96.01 ACUTE RESPIRATORY FAILURE WITH HYPOXIA (HCC): Status: RESOLVED | Noted: 2018-08-18 | Resolved: 2018-08-28

## 2018-08-28 LAB
ALBUMIN SERPL ELPH-MCNC: 3.64 G/DL (ref 3.5–5)
ALBUMIN SERPL ELPH-MCNC: 56.9 % (ref 52–65)
ALBUMIN UR ELPH-MCNC: 32.5 %
ALPHA1 GLOB MFR UR ELPH: 22.6 %
ALPHA1 GLOB SERPL ELPH-MCNC: 0.48 G/DL (ref 0.1–0.4)
ALPHA1 GLOB SERPL ELPH-MCNC: 7.5 % (ref 2.5–5)
ALPHA2 GLOB MFR UR ELPH: 11.8 %
ALPHA2 GLOB SERPL ELPH-MCNC: 0.67 G/DL (ref 0.4–1.2)
ALPHA2 GLOB SERPL ELPH-MCNC: 10.4 % (ref 7–13)
ANION GAP SERPL CALCULATED.3IONS-SCNC: 5 MMOL/L (ref 4–13)
B-GLOBULIN MFR UR ELPH: 13.9 %
BETA GLOB ABNORMAL SERPL ELPH-MCNC: 0.39 G/DL (ref 0.4–0.8)
BETA1 GLOB SERPL ELPH-MCNC: 6.1 % (ref 5–13)
BETA2 GLOB SERPL ELPH-MCNC: 5 % (ref 2–8)
BETA2+GAMMA GLOB SERPL ELPH-MCNC: 0.32 G/DL (ref 0.2–0.5)
BUN SERPL-MCNC: 36 MG/DL (ref 5–25)
CALCIUM SERPL-MCNC: 9.1 MG/DL (ref 8.3–10.1)
CHEST PAIN STATEMENT: NORMAL
CHLORIDE SERPL-SCNC: 99 MMOL/L (ref 100–108)
CO2 SERPL-SCNC: 37 MMOL/L (ref 21–32)
CREAT SERPL-MCNC: 1.82 MG/DL (ref 0.6–1.3)
GAMMA GLOB ABNORMAL SERPL ELPH-MCNC: 0.9 G/DL (ref 0.5–1.6)
GAMMA GLOB MFR UR ELPH: 19.2 %
GAMMA GLOB SERPL ELPH-MCNC: 14.1 % (ref 12–22)
GFR SERPL CREATININE-BSD FRML MDRD: 28 ML/MIN/1.73SQ M
GLUCOSE SERPL-MCNC: 84 MG/DL (ref 65–140)
IGG/ALB SER: 1.32 {RATIO} (ref 1.1–1.8)
MAX DIASTOLIC BP: 80 MMHG
MAX HEART RATE: 108 BPM
MAX PREDICTED HEART RATE: 151 BPM
MAX. SYSTOLIC BP: 180 MMHG
POTASSIUM SERPL-SCNC: 3.7 MMOL/L (ref 3.5–5.3)
PROT PATTERN UR ELPH-IMP: ABNORMAL
PROT SERPL-MCNC: 6.4 G/DL (ref 6.4–8.2)
PROT UR-MCNC: 68 MG/DL
PROTOCOL NAME: NORMAL
REASON FOR TERMINATION: NORMAL
SODIUM SERPL-SCNC: 141 MMOL/L (ref 136–145)
TARGET HR FORMULA: NORMAL
TEST INDICATION: NORMAL
TIME IN EXERCISE PHASE: NORMAL

## 2018-08-28 PROCEDURE — 99232 SBSQ HOSP IP/OBS MODERATE 35: CPT | Performed by: INTERNAL MEDICINE

## 2018-08-28 PROCEDURE — 94760 N-INVAS EAR/PLS OXIMETRY 1: CPT

## 2018-08-28 PROCEDURE — 92526 ORAL FUNCTION THERAPY: CPT

## 2018-08-28 PROCEDURE — 80048 BASIC METABOLIC PNL TOTAL CA: CPT | Performed by: FAMILY MEDICINE

## 2018-08-28 PROCEDURE — 97535 SELF CARE MNGMENT TRAINING: CPT

## 2018-08-28 PROCEDURE — 99239 HOSP IP/OBS DSCHRG MGMT >30: CPT | Performed by: FAMILY MEDICINE

## 2018-08-28 RX ORDER — SPIRONOLACTONE 25 MG/1
25 TABLET ORAL DAILY
Refills: 0
Start: 2018-08-29

## 2018-08-28 RX ORDER — LEVOTHYROXINE SODIUM 137 UG/1
137 TABLET ORAL EVERY OTHER DAY
Refills: 0
Start: 2018-08-29

## 2018-08-28 RX ORDER — LEVOTHYROXINE SODIUM 0.15 MG/1
150 TABLET ORAL EVERY OTHER DAY
Refills: 0
Start: 2018-08-30 | End: 2019-01-24

## 2018-08-28 RX ORDER — LOSARTAN POTASSIUM 25 MG/1
25 TABLET ORAL DAILY
Refills: 0
Start: 2018-08-29 | End: 2018-10-24

## 2018-08-28 RX ORDER — FUROSEMIDE 40 MG/1
40 TABLET ORAL DAILY
Status: DISCONTINUED | OUTPATIENT
Start: 2018-08-29 | End: 2018-08-28 | Stop reason: HOSPADM

## 2018-08-28 RX ORDER — FUROSEMIDE 40 MG/1
40 TABLET ORAL DAILY
Refills: 0
Start: 2018-08-29

## 2018-08-28 RX ORDER — NYSTATIN 100000 [USP'U]/G
1 POWDER TOPICAL 3 TIMES DAILY
Qty: 15 G | Refills: 0 | Status: SHIPPED | OUTPATIENT
Start: 2018-08-28 | End: 2019-01-07

## 2018-08-28 RX ADMIN — NYSTATIN 1 APPLICATION: 100000 POWDER TOPICAL at 09:19

## 2018-08-28 RX ADMIN — DEXTRAN 70 AND HYPROMELLOSE 2910 1 DROP: 1; 3 SOLUTION/ DROPS OPHTHALMIC at 09:18

## 2018-08-28 RX ADMIN — VITAMIN D, TAB 1000IU (100/BT) 1000 UNITS: 25 TAB at 09:18

## 2018-08-28 RX ADMIN — LOSARTAN POTASSIUM 25 MG: 25 TABLET, FILM COATED ORAL at 09:18

## 2018-08-28 RX ADMIN — FUROSEMIDE 40 MG: 40 TABLET ORAL at 09:18

## 2018-08-28 RX ADMIN — SPIRONOLACTONE 25 MG: 25 TABLET, FILM COATED ORAL at 09:18

## 2018-08-28 RX ADMIN — RIVAROXABAN 15 MG: 15 TABLET, FILM COATED ORAL at 09:18

## 2018-08-28 RX ADMIN — DIVALPROEX SODIUM 250 MG: 500 TABLET, EXTENDED RELEASE ORAL at 09:18

## 2018-08-28 RX ADMIN — ATORVASTATIN CALCIUM 10 MG: 10 TABLET, FILM COATED ORAL at 09:18

## 2018-08-28 RX ADMIN — LEVOTHYROXINE SODIUM 150 MCG: 150 TABLET ORAL at 09:18

## 2018-08-28 RX ADMIN — Medication 100 MG: at 09:18

## 2018-08-28 NOTE — DISCHARGE SUMMARY
Discharge Summary - [facility] Internal Medicine    Patient Information: Fransisca Diaz 70 y o  female MRN: [id number]  Unit/Bed#: [address] [facility] Encounter: [id number]    Discharging Physician / Practitioner: Brian Chavez DO  PCP: Abelardo Mireles DO  Admission Date: 8/18/2018  Discharge Date: 08/28/18    Reason for Admission: Chest Pain and Shortness of Breath (started at 3 pm today)      Discharge Diagnoses:     Principal Problem (Resolved):    Acute on chronic diastolic congestive heart failure (HCC)  Active Problems:    Bilateral pleural effusion    Chronic kidney disease, stage 3    Thrombocytopenia (HCC)    Multiple sclerosis (HCC)    Seizure disorder (HCC)    Chronic atrial fibrillation ([address] )    Essential hypertension    Mixed hyperlipidemia    Schizophrenia ([facility] [address] )    Hypothyroidism    Obstructive sleep apnea    Vitamin D deficiency  Resolved Problems:    Acute respiratory failure with hypoxia ([facility] [address] )    Toxic metabolic encephalopathy    Sepsis ([facility] [address] )    Leukopenia    HCAP (healthcare-associated pneumonia)    Acute pulmonary edema ([facility] [address] )    Hypothermia    Hypoglycemia        * Acute on chronic diastolic congestive heart failure (HCC)resolved as of 8/28/2018   Assessment & Plan    Symptoms have improved and now appears compensated  She was initially on IV Lasix and now transitioned to p o  Lasix daily  Echo showed EF of 51% with mild concentric hypertrophy with mild aortic and mitral valve regurgitation   Etiology of sudden pulmonary edema and CHF is not clear  Continue Aldactone, Cozaar  Repeat chest x-ray showed improving vascular congestion  Cardiology follow-up after discharge   repeat BMP as outpatient  Patient had nuclear stress test which was normal        Acute respiratory failure with hypoxia (HCC)resolved as of 8/28/2018   Assessment & Plan    Titrated off supplemental oxygen by nasal cannula and appears stable  Required BiPAP support initially and was in the ICU        Chronic kidney disease, stage 3   Assessment & Plan    Patient's baseline creatinine is about 1 6-1 8  Creatinine has trended up on lab work today although appears close to baseline  Repeat lab work as outpatient        Bilateral pleural effusion   Assessment & Plan    Likely due to CHF exacerbation  Continue p o  Lasix        Toxic metabolic encephalopathyresolved as of 8/28/2018   Assessment & Plan    Patient's Depakote levels were normal  EEG showed mild background slowing as per Neurology  MRI of the brain showed chronic demyelinating disease but no acute intracranial abnormality        Vitamin D deficiency   Assessment & Plan    Continue vitamin D3         Obstructive sleep apnea   Assessment & Plan    Need sleep study as outpatient  Follow up with Pulmonary for this        Hypothyroidism   Assessment & Plan    TSH noted to be elevated at 7  Continue levothyroxine 137 microgram alternating with levothyroxine 150 mcg every other day  Repeat TSH level in 4-6 weeks        Schizophrenia (HCC)   Assessment & Plan    Continue Abilify        Mixed hyperlipidemia   Assessment & Plan    Continue statin        Essential hypertension   Assessment & Plan    Continue Aldactone, Cozaar        Chronic atrial fibrillation ([address] )   Assessment & Plan    Patient was noted to have low heart rate on telemetry and amiodarone was discontinued  Continue Xarelto        Seizure disorder [facility])   Assessment & Plan    Continue Depakote  Multiple sclerosis [facility])   Assessment & Plan    Not actively being treated at this time  MRI brain showed findings of chronic demyelinating disease but no acute intracranial abnormality noted         Thrombocytopenia (HCC)   Assessment & Plan    History of chronic thrombocytopenia  Platelet count improved on prior lab work and now within normal limits        Hypoglycemiaresolved as of 8/22/2018   Assessment & Plan    Resolved  Leukopeniaresolved as of 8/21/2018   Assessment & Plan    Resolved  Consultations During Hospital Stay:  IP CONSULT TO CASE MANAGEMENT  IP CONSULT TO NUTRITION SERVICES  IP CONSULT TO NEUROLOGY  IP CONSULT TO CARDIOLOGY    Procedures Performed:     · Stress test  · Echo  · EEG    Significant Findings:     · See hospital course and above    Imaging while in hospital:    Ct Chest Abdomen Pelvis Wo Contrast    Result Date: 8/20/2018  Narrative: CT CHEST, ABDOMEN AND PELVIS WITHOUT IV CONTRAST INDICATION:   Sepsis  COMPARISON:  CT abdomen and pelvis 11/3/2011 TECHNIQUE: CT examination of the chest, abdomen and pelvis was performed without intravenous contrast   Axial, sagittal, and coronal 2D reformatted images were created from the source data and submitted for interpretation  Radiation dose length product (DLP) for this visit:  1744 26 mGy-cm   This examination, like all CT scans performed in the [facility], was performed utilizing techniques to minimize radiation dose exposure, including the use of iterative reconstruction and automated exposure control  Enteric contrast was administered  FINDINGS: CHEST LUNGS:  Bilateral lower lobe passive atelectasis noted with subtle groundglass upper lobe opacities likely inflammatory and/or infectious  PLEURA:  Moderate to large bilateral pleural effusions, right greater than left  HEART/GREAT VESSELS:  The heart is enlarged with no pericardial effusion  MEDIASTINUM AND DEBO:  No pathologic lymphadenopathy  There is reflux of contrast into the midesophagus  CHEST WALL AND LOWER NECK:   Unremarkable  ABDOMEN LIVER/BILIARY TREE:  Trace perihepatic ascites  No discrete mass on this nonenhanced study nor biliary ductal dilatation  GALLBLADDER:  Gallbladder is surgically absent  SPLEEN:  Unremarkable  PANCREAS:  Unremarkable  ADRENAL GLANDS:  Unremarkable  KIDNEYS/URETERS:  Chronic right renal atrophy  No hydronephrosis or perinephric collections  STOMACH AND BOWEL:  Constipation with no bowel obstruction   APPENDIX:  A normal appendix was visualized  ABDOMINOPELVIC CAVITY:  Minimal ascites predominantly in the upper abdomen and bilateral flanks  VESSELS:  Unremarkable for patient's age  PELVIS REPRODUCTIVE ORGANS:  Patient is status post hysterectomy  Prominent right ovary is a stable finding  URINARY BLADDER:  Catheterized, thick wall bladder noted with mild pericystic inflammation  Cystitis is not excluded  ABDOMINAL WALL/INGUINAL REGIONS:  3rd spacing of fluid concerning for anasarca in the lower abdomen and pelvis  OSSEOUS STRUCTURES:  No acute fracture or destructive osseous lesion  There is multilevel degenerative disc disease of the scoliotic spine  Impression: 1  Bilateral pleural effusions, right greater than left, with lower lobe compressive atelectasis  Scattered groundglass infiltrates likely infectious or inflammatory  2   Trace ascites with mild anasarca in the lower abdominal wall and pelvis  3   Constipation  Workstation performed: [id number]     Xr Chest Portable    Result Date: 8/27/2018  Narrative: CHEST INDICATION:   f/u CHF  COMPARISON:  Chest radiographs August 25, 2018 EXAM PERFORMED/VIEWS:  XR CHEST PORTABLE FINDINGS: The heart is enlarged  Atherosclerotic changes in the aorta  Lung volumes diminished  Improving vascular congestion  Elevation of right hemidiaphragm  Bibasilar atelectasis  Osseous structures appear within normal limits for patient age  Postoperative changes cervical spine  Impression: Resolving vascular congestion  Workstation performed: [id number]     Xr Chest Portable    Result Date: 8/26/2018  Narrative: CHEST INDICATION:   Follow up pleural effusions  COMPARISON:  8/22/2018 EXAM PERFORMED/VIEWS:  XR CHEST PORTABLE FINDINGS: Cardiomediastinal silhouette appears unremarkable  There is mild vascular congestion though improved  Pleural effusions also appear improved  Osseous structures appear within normal limits for patient age       Impression: Continued improvement of congestive change and pleural effusions  Workstation performed: [id number]     X-ray Chest 1 View Portable    Result Date: 8/18/2018  Narrative: CHEST INDICATION:   resp distress  COMPARISON:  AP chest 4/4/2014  EXAM PERFORMED/VIEWS:  XR CHEST PORTABLE FINDINGS: Stable cardiac and mediastinal contours  Prominent central vasculature and bilateral airspace disease likely on the basis of pulmonary edema  Small bibasilar pleural effusions  Osseous structures appear within normal limits for patient age  Impression: Prominent central vasculature and bilateral airspace disease likely on the basis of pulmonary edema  Small bibasilar pleural effusions  Workstation performed: [id number]     Ct Head Without Contrast    Result Date: 8/18/2018  Narrative: CT BRAIN - WITHOUT CONTRAST INDICATION:   lethargic  Patient found unresponsive at nursing home  COMPARISON:  3/12/2014  TECHNIQUE:  CT examination of the brain was performed  In addition to axial images, coronal 2D reformatted images were created and submitted for interpretation  Radiation dose length product (DLP) for this visit:  1195 01 mGy-cm   This examination, like all CT scans performed in the [facility], was performed utilizing techniques to minimize radiation dose exposure, including the use of iterative reconstruction and automated exposure control  IMAGE QUALITY:  Diagnostic  FINDINGS: PARENCHYMA: Decreased attenuation is noted in periventricular and subcortical white matter demonstrating an appearance that is statistically most likely to represent mild microangiopathic change  No CT signs of acute infarction  No intracranial mass, mass effect or midline shift  No acute parenchymal hemorrhage  VENTRICLES AND EXTRA-AXIAL SPACES:  Normal for the patient's age  VISUALIZED ORBITS AND PARANASAL SINUSES:  Unremarkable  CALVARIUM AND EXTRACRANIAL SOFT TISSUES:  Normal      Impression: No acute intracranial abnormality    Microangiopathic changes  Workstation performed: [id number]     Mri Brain W Wo Contrast    Result Date: 8/24/2018  Narrative: MRI BRAIN WITH AND WITHOUT CONTRAST INDICATION: ams, r/o mets, h/o mets in thoracic spine  History of demyelinating disease  COMPARISON:  12/30/2016  TECHNIQUE: Sagittal T1, axial T2, axial FLAIR, axial T1, axial [place], axial diffusion  Sagittal, axial T1 postcontrast   Axial bravo postcontrast with coronal reconstructions  IV Contrast:  5 mL of Gadobutrol injection (SINGLE-DOSE)  IMAGE QUALITY:   Examination limited by patient motion  FINDINGS: BRAIN PARENCHYMA:  Right matter hyperintensities are seen within the periventricular white matter bilaterally consistent with chronic demyelinating disease, possibly with superimposed chronic microangiopathic change  This appears grossly stable  Brainstem and cerebellum demonstrate normal signal   Diffusion imaging is unremarkable with no signs of acute ischemia  Normal corpus callosum and hypothalamus  Mild volume loss of the cerebral hemispheres, brainstem and cerebellum  Postcontrast imaging of the brain demonstrates no abnormal enhancement  VENTRICLES:  Stable mild ventricular enlargement with particular enlargement of the atria of the lateral ventricles  This is likely related to the degree of chronic white matter change and cerebral volume loss  SELLA AND PITUITARY GLAND:  Normal  ORBITS:  Normal  PARANASAL SINUSES:  Normal  VASCULATURE:  Evaluation of the major intracranial vasculature demonstrates appropriate flow voids  CALVARIUM AND SKULL BASE:  Normal  EXTRACRANIAL SOFT TISSUES:  Normal      Impression: Stable chronic white matter changes consistent with chronic demyelinating disease, possibly with superimposed chronic microangiopathic change  No acute ischemia, mass or hemorrhage  Workstation performed: [id number]     Xr Chest Portable Icu    Result Date: 8/22/2018  Narrative: CHEST INDICATION:   CHF   COMPARISON:  8/20/2018 EXAM PERFORMED/VIEWS: XR CHEST PORTABLE ICU FINDINGS: Heart shadow is enlarged but unchanged from prior exam  Mild improvement in congestive changes with bibasilar densities and pleural effusions  Osseous structures appear within normal limits for patient age  Impression: Mild improvement in congestive changes with bibasilar densities and pleural effusions  Workstation performed: [id number]     Xr Chest Portable Icu    Result Date: 8/20/2018  Narrative: CHEST INDICATION:   CHF/pneumonia  COMPARISON:  8/19/2018 EXAM PERFORMED/VIEWS:  XR CHEST PORTABLE ICU FINDINGS: Heart shadow is enlarged but unchanged from prior exam  Bilateral pleural effusions are now evident with bibasilar airspace opacities and pulmonary congestion  No improvement has occurred  There is no pneumothorax Osseous structures appear within normal limits for patient age  Impression: Bilateral pleural effusions with bibasilar airspace disease and pulmonary congestion  There has been no interval improvement Workstation performed: [id number]     Xr Chest Portable Icu    Result Date: 8/20/2018  Narrative: CHEST INDICATION:   Pneumonia  COMPARISON:  8/18/2018 EXAM PERFORMED/VIEWS:  XR CHEST PORTABLE ICU FINDINGS: Heart shadow is enlarged but unchanged from prior exam  There is persistent right basilar airspace disease, similar, and linear areas of atelectasis on the left, similar  No pneumothorax  Pulmonary vascular congestion and suspected right effusion again noted  Osseous structures appear within normal limits for patient age       Impression: No significant interval change since 8/18/2018 Workstation performed: [id number]       Incidental Findings:   · none    Test Results Pending at Discharge (will require follow up):   · As per After Visit Summary     Outpatient Tests Requested:  · BMP  · Sleep study  · TSH level    Complications:  See hospital course and above    Hospital Course:     Pawan Joe is a 70 y o  female patient who originally presented to the hospital on 8/18/2018 due to cyanosis and altered mental status  She reported shortness of breath and was reportedly hypoxic  In the ER she was placed on BiPAP  She was also started on Tridil for acute pulmonary edema which was later titrated off due to hypotension  She was started on IV antibiotics for possible pneumonia  She was initially admitted to the ICU  She was started on IV Lasix  She was seen by Cardiology  She was able to be titrated off of BiPAP  Her breathing did improve while here  She was also seen by Neurology for her encephalopathy  EEG was done  She was noted to have low heart rate on telemetry and amiodarone was discontinued  She was cleared by all consultants involved in her care prior to discharge  Discharge plan was discussed with not only the patient but also her sister over the phone    Please see above list of diagnoses and related plan for additional information  Condition at Discharge: stable     Discharge Day Visit / Exam:     Subjective:  Patient denies any chest pain  States that breathing has improved    Vitals: Blood Pressure: 123/56 (08/28/18 1321)  Pulse: 80 (08/28/18 1321)  Temperature: (!) 97 3 °F (36 3 °C) (08/28/18 1321)  Temp Source: Oral (08/28/18 1321)  Respirations: 20 (08/28/18 1321)  Height: 5' 5" (165 1 cm) (08/18/18 2236)  Weight - Scale: 104 kg (229 lb 15 oz) (08/28/18 0500)  SpO2: 92 % (08/28/18 1321)  Exam:   Physical Exam   Constitutional: She is oriented to person, place, and time  She appears well-developed and well-nourished  No distress  HENT:   Head: Normocephalic and atraumatic  Eyes: EOM are normal  Right eye exhibits no discharge  Left eye exhibits no discharge  No scleral icterus  Neck: Neck supple  Cardiovascular:   Irregularly, irregular rate and rhythm   Pulmonary/Chest: Effort normal  No respiratory distress  She has no wheezes  She has no rales  Decreased breath sounds bilaterally   Abdominal: Soft   Bowel sounds are normal  She exhibits no distension  There is no tenderness  Musculoskeletal: She exhibits no edema  Neurological: She is alert and oriented to person, place, and time  No cranial nerve deficit  She exhibits abnormal muscle tone  Right lower extremity 2/5, left lower extremity 1/5 motor strength  Bilateral upper extremity motor strength 2/5  Speech is slow   Skin: Skin is dry  She is not diaphoretic  Discharge instructions/Information to patient and family:(Discharge Medications and Follow up):   See after visit summary for information provided to patient and family  Provisions for Follow-Up Care:  See after visit summary for information related to follow-up care and any pertinent home health orders  Disposition: Back to [facility], [facility]     Planned Readmission:  No     Discharge Statement:  I spent > 30 minutes discharging the patient  This time was spent on the day of discharge  I had direct contact with the patient on the day of discharge  Greater than 50% of the total time was spent examining patient, answering all patient questions, arranging and discussing plan of care with patient as well as directly providing post-discharge instructions  Additional time then spent on discharge activities  Discharge Medications:  See after visit summary for reconciled discharge medications provided to patient and family  ** Please Note:  Dictation voice to text software may have been used in the creation of this document   **

## 2018-08-28 NOTE — SOCIAL WORK
Discharge ordered  Pt will be transferred back to [facility]  at a skilled level of care  [place] scheduled to transport via stretcher  RN, CCB and pt/sister Kathleen Scanlon) aware of plan

## 2018-08-28 NOTE — [place] UNIVERSAL TRANSFER FORM
[place] UNIVERSAL TRANSFER FORM  (ALL ITEMS MUST BE COMPLETED)    1  TRANSFER FROM: [facility]      TRANSFER TO: [facility], [facility]      2  DATE OF TRANSFER: 8/28/2018                        TIME OF TRANSFER: 1530    3  PATIENT NAME: Mazie Crews, Ollie Merlin      YOB: 1950                             GENDER: female    4  LANGUAGE:   English    5  PHYSICIAN NAME:  Brian Chavez DO                   PHONE: [phone]    6  CODE STATUS: Level 3 - DNAR and DNI        Out of Hospital DNR Attached: No    7  :                                      :  Extended Emergency Contact Information  Primary Emergency Contact: Joon Gunn [address] Phone: [phone]  Mobile Phone: [phone]  Relation[place] [id number] [facility] [place]/Proxy:  No           Legal Guardian:  No             NAME OF:           HEALTH CARE REPRESENTATIVE/PROXY:                                         OR           LEGAL GUARDIAN, IF NOT :                                               PHONE:  (Day)           (Night)                        (Cell)    8  REASON FOR TRANSFER: Returning to care center after being treated for Pulmonary Edema and congestive heart failure  V/S: /52 (BP Location: Right arm)   Pulse 79   Temp 97 9 °F (36 6 °C) (Oral)   Resp 18   Ht 5' 5" (1 651 m)   Wt 104 kg (229 lb 15 oz)   SpO2 96%   BMI 38 26 kg/m²           PAIN: None    9  PRIMARY DIAGNOSIS: Acute on chronic diastolic congestive heart failure ([facility] [address] )      Secondary Diagnosis:         Pacemaker: No      Internal Defib: No          Mental Health Diagnosis (if Applicable):    10  RESTRAINTS: No     11  RESPIRATORY NEEDS: None    12  ISOLATION/PRECAUTION: None    13  ALLERGY: Ciprocinonide [fluocinolone]; Darvon [propoxyphene]; Keflex [cephalexin]; Lithium;  Penicillins; Sulfa antibiotics; Zithromax [azithromycin]; and Ethylenediamine tetra-acetate [edetic acid]    14  SENSORY:       Vision Poor and Glasses    15  SKIN CONDITION: No Wounds    16  DIET: Mechanically Altered Diet    17  IV ACCESS: None    18  PERSONAL ITEMS SENT WITH PATIENT: Glasses    19  ATTACHED DOCUMENTS: MUST ATTACH CURRENT MEDICATION INFORMATION MAR, Medication Reconciliation, TAR, POS, Diagnostic Studies, Labs, Respiratory Care, Code Status, Discharge Summary, PT Note, OT Note, ST Note and HX/PE    20  AT RISK ALERTS:Falls, Pressure Ulcer and Aspiration        HARM TO: N/A    21  WEIGHT BEARING STATUS:         Left Leg: Limited        Right Leg: Limited    22  MENTAL STATUS:Alert, Oriented and Forgetful    23  FUNCTION:        Walk: Not Able        Transfer: Not Able        Toilet: Not Able        Feed: Self    24  IMMUNIZATIONS/SCREENING:     There is no immunization history on file for this patient  25  BOWEL: Incontinent  Last BM : 08/28/18    26  BLADDER: Incontinent    27  SENDING FACILITY CONTACT: Jennifer Whitfield                  Title: R N        Unit: 2S        Phone: [phone] (if known):        Title:        Unit:         Phone:         FORM PREFILLED BY (if applicable)       Title:       Unit:        Phone:         FORM COMPLETED BY Jennifer Whitfield RN      Title: R N         Phone: [phone]

## 2018-08-28 NOTE — NURSING NOTE
Discharged to [facility]  Stretcher transported  Report given to receiving nurse  NO complaints of pain prior to discharge

## 2018-08-28 NOTE — PROGRESS NOTES
Progress Note - Cardiology   Joey Garcia 70 y o  female MRN: [id number]  Unit/Bed#: [address] Encounter: [id number]    Assessment/Plan:    Acute on chronic diastolic heart failure:  Patient has diuresed 12 L of fluid since admission to hospital   Continue with ARB, Lasix and Aldactone  Monitor Vital signs and renal function  Low salt diet  Continue daily weights       2   Essential hypertension:  Continue ARB  Monitor renal function        3  Chronic atrial fibrillation:  Continue her Xarelto and monitor heart rate      4  Chronic morbid obesity:  BMI 38 1     5  Hypothyroidism:   continue her Synthroid per primary team     6  Mixed dyslipidemia:  Continue statin       Subjective/Objective   Patient seen and examined  She is doing well  She denies chest pain, pressure or palpitations  Feels that her breathing has improved  Will need Outpatient evaluation for AJITH           Vitals: /52 (BP Location: Right arm)   Pulse 79   Temp 97 9 °F (36 6 °C) (Oral)   Resp 18   Ht 5' 5" (1 651 m)   Wt 104 kg (229 lb 15 oz)   SpO2 96%   BMI 38 26 kg/m²   Vitals:    08/27/18 0543 08/28/18 0500   Weight: 108 kg (237 lb 6 4 oz) 104 kg (229 lb 15 oz)     Orthostatic Blood Pressures      Most Recent Value   Blood Pressure  107/52 filed at 08/28/2018 0830   Patient Position - Orthostatic VS  Lying filed at 08/28/2018 0830            Intake/Output Summary (Last 24 hours) at 08/28/18 1017  Last data filed at 08/28/18 0901   Gross per 24 hour   Intake              740 ml   Output              600 ml   Net              140 ml       Invasive Devices     Peripheral Intravenous Line            Long-Dwell Peripheral IV (Midline) 29/08/79 Left Cephalic 5 days                Review of Systems: General ROS: negative  Respiratory ROS: no cough, shortness of breath, or wheezing  Cardiovascular ROS: no chest pain or dyspnea on exertion  Gastrointestinal ROS: no abdominal pain, change in bowel habits, or black or bloody stools    Physical Exam: /52 (BP Location: Right arm)   Pulse 79   Temp 97 9 °F (36 6 °C) (Oral)   Resp 18   Ht 5' 5" (1 651 m)   Wt 104 kg (229 lb 15 oz)   SpO2 96%   BMI 38 26 kg/m²     General Appearance:    Alert, cooperative, no distress, appears stated age   Head:    Normocephalic, without obvious abnormality, atraumatic   Eyes:    PERRL, conjunctiva/corneas clear, EOM's intact, fundi     benign, both eyes   Ears:    Normal TM's and external ear canals, both ears   Nose:   Nares normal, septum midline, mucosa normal, no drainage    or sinus tenderness   Throat:   Lips, mucosa, and tongue normal; teeth and gums normal   Neck:   Supple, symmetrical, trachea midline, no adenopathy;     thyroid:  no enlargement/tenderness/nodules; no carotid    bruit or JVD   Back:     Symmetric, no curvature, ROM normal, no CVA tenderness   Lungs:     Clear to auscultation bilaterally, respirations unlabored   Chest Wall:    No tenderness or deformity    Heart:    Regular rate and rhythm, S1 and S2 normal, no murmur, rub   or gallop   Breast Exam:    No tenderness, masses, or nipple abnormality   Abdomen:     Soft, non-tender, bowel sounds active all four quadrants,     no masses, no organomegaly   Genitalia:    Normal female without lesion, discharge or tenderness   Rectal:    Normal tone, no masses or tenderness; guaiac negative stool   Extremities:   Extremities normal, atraumatic, no cyanosis or edema   Pulses:   2+ and symmetric all extremities   Skin:   Skin color, texture, turgor normal, no rashes or lesions   Lymph nodes:   Cervical, supraclavicular, and axillary nodes normal   Neurologic:   CNII-XII intact, normal strength, sensation and reflexes     throughout       Lab Results:   I have personally reviewed pertinent lab results      CBC with diff:   Results from last 7 days  Lab Units 08/26/18  0623 08/24/18  0440   WBC Thousand/uL  --  6 81   RBC Million/uL  --  3 52*   HEMOGLOBIN g/dL  --  10 8* HEMATOCRIT %  --  33 8*   MCV fL  --  96   MCH pg  --  30 7   MCHC g/dL  --  32 0   RDW %  --  15 4*   MPV fL 11 8 12 6   PLATELETS Thousands/uL 181 106*     CMP:   Results from last 7 days  Lab Units 08/28/18  0522   SODIUM mmol/L 141   POTASSIUM mmol/L 3 7   CHLORIDE mmol/L 99*   CO2 mmol/L 37*   BUN mg/dL 36*   CREATININE mg/dL 1 82*   CALCIUM mg/dL 9 1   EGFR ml/min/1 73sq m 28     Imaging: I have personally reviewed pertinent reports      VTE Pharmacologic Prophylaxis:   On Xarelto  VTE Mechanical Prophylaxis: sequential compression device

## 2018-08-28 NOTE — OCCUPATIONAL THERAPY NOTE
OT TREATMENT       08/28/18 1423   Restrictions/Precautions   Other Precautions Bed Alarm; Fall Risk   Pain Assessment   Pain Assessment No/denies pain   ADL   Where Assessed Supine, bed   Eating Assistance 5  Supervision/Setup   Eating Deficit Setup;Verbal cueing; Increased time to complete   Therapeutic Excerise-Strength   UE Strength Yes   Right Upper Extremity- Strength   R Shoulder Flexion; Extension   R Elbow Elbow flexion;Elbow extension   R Wrist Wrist flexion;Wrist extension   R Hand (flex/ext)   R Position Supine   R Weight/Reps/Sets 10 reps   Left Upper Extremity-Strength   L Shoulder Flexion; Extension   L Elbow Elbow flexion;Elbow extension   L Wrist Wrist flexion;Wrist extension   L Hand (flex/ext)   L Position Supine   L Weights/Reps/Sets 10 reps   Cognition   Arousal/Participation Alert; Cooperative   Attention Attends with cues to redirect   Orientation Level Oriented X4   Following Commands Follows multistep commands with increased time or repetition   Activity Tolerance   Activity Tolerance Patient limited by fatigue   Medical Staff Made Aware Andrew Pierre RN   Assessment   Assessment Pt seen for self feeding training  Pt reports, "I had lots of practice all day today because they keep giving me food " Pt able to feed herself applesauce, a cookie, and drink from a covered cup with straw with Shelley and min cues only  Pt needs increased time and proper positioning in bed to facilitate improved hand to mouth coordination  Pt provided with education regarding simple BUE exercises to maintain/increase strength needed for ADLs  Cont OT per POC  Plan   Treatment Interventions ADL retraining;Functional transfer training; Endurance training;UE strengthening/ROM; Patient/family training;Equipment evaluation/education; Activityengagement; Energy conservation   Treatment Day 4   OT Frequency 2-3x/wk   Recommendation   OT Discharge Recommendation (return to SNF with PT/OT)   Barthel Index   Feeding 5   Bathing 0 Grooming Score 0   Dressing Score 0   Bladder Score 0   Bowels Score 0   Toilet Use Score 0   Transfers (Bed/Chair) Score 0   Mobility (Level Surface) Score 0   Stairs Score 0   Barthel Index Score 5   Licensure   [place] License Number  Tennille Smack Florian Kawasaki [address], OTR/L, [facility]# [id number]

## 2018-08-28 NOTE — SPEECH THERAPY NOTE
Speech Language/Pathology    Speech/Language Pathology Progress Note    Patient Name: Sabrina Romano  [id number] Date: 8/28/2018     Problem List  Patient Active Problem List   Diagnosis    Anemia    Acute respiratory failure with hypoxia (HCC)    Toxic metabolic encephalopathy    Thrombocytopenia (HCC)    Multiple sclerosis (HCC)    Seizure disorder (HCC)    Bilateral pleural effusion    Bradycardia    Abnormal MRI, thoracic spine    Chronic atrial fibrillation (HCC)    Bone lesion    Breast neoplasm    Cerebral infarction due to cerebral artery occlusion (HCC)    Cervical spinal stenosis    Depression    Essential hypertension    Lumbosacral spinal stenosis    Manic depressive disorder ([address] )    Mixed hyperlipidemia    Schizophrenia ([address]    Hypothyroidism    Obstructive sleep apnea    Acute on chronic diastolic congestive heart failure (HCC)    Chronic kidney disease, stage 3    Vitamin D deficiency        Past Medical History  Past Medical History:   Diagnosis Date    Atrial fibrillation ([address] )     Bipolar disorder ([address] )     Disease of thyroid gland     Heart failure ([address] )     afibrillation    Hyperlipidemia     Hypertension     Irregular heart beat     Multiple sclerosis (HCC)     Paralysis ([address] )     left hemiplegia    Psychiatric disorder     depression, schizophrenia    Schizophrenia ([address] )     Stroke ([facility] [address]     left hemiplegia        Past Surgical History  Past Surgical History:   Procedure Laterality Date    BACK SURGERY      2    CHOLECYSTECTOMY      COLONOSCOPY N/A 1/19/2017    Procedure: COLONOSCOPY;  Surgeon: Jay Oconnor MD;  Location: [facility]; Service:     ESOPHAGOGASTRODUODENOSCOPY N/A 1/19/2017    Procedure: ESOPHAGOGASTRODUODENOSCOPY (EGD); Surgeon: Jay Oconnor MD;  Location: [facility];   Service:     EYE MUSCLE SURGERY Left     HYSTERECTOMY      TONSILLECTOMY       Subjective:  Patient awake and alert seated upright in chair with lunch tray present  Patient reports that she needs assistance with eating  Objective:  Patient continues on puree diet with thin liquids  Previous notes indicate that patient is satisfied with present diet and is not requesting an upgrade  Patient swallowed thin liquids from a straw and puree solids from a spoon  Mild delay in oral preparation and transport of liquid and puree, mild delay in the initiation of the swallow, adequate laryngeal elevation, clear voice quality prior to the session and following each swallow  Assessment:  Swallow skills continue to be safe and WFL for puree diet with thin liquids  Plan/Recommendations:  1)  Continue puree diet with thin liquids  2)  Aspiration precautions  3)  Medications administered as patient desires  4)  Patient may be discharged from speech therapy services      GENNARO Beckham , [address] Pathologist  [phone]

## 2018-09-11 ENCOUNTER — OFFICE VISIT (OUTPATIENT)
Dept: PULMONOLOGY | Facility: MEDICAL CENTER | Age: 68
End: 2018-09-11
Payer: MEDICARE

## 2018-09-11 VITALS
DIASTOLIC BLOOD PRESSURE: 68 MMHG | OXYGEN SATURATION: 95 % | RESPIRATION RATE: 12 BRPM | HEIGHT: 65 IN | HEART RATE: 87 BPM | BODY MASS INDEX: 37.49 KG/M2 | SYSTOLIC BLOOD PRESSURE: 116 MMHG | WEIGHT: 225 LBS

## 2018-09-11 DIAGNOSIS — R06.02 SOB (SHORTNESS OF BREATH): Primary | ICD-10-CM

## 2018-09-11 DIAGNOSIS — J90 BILATERAL PLEURAL EFFUSION: ICD-10-CM

## 2018-09-11 DIAGNOSIS — G47.33 OBSTRUCTIVE SLEEP APNEA: Chronic | ICD-10-CM

## 2018-09-11 PROCEDURE — 99214 OFFICE O/P EST MOD 30 MIN: CPT | Performed by: INTERNAL MEDICINE

## 2018-09-11 PROCEDURE — 94010 BREATHING CAPACITY TEST: CPT | Performed by: INTERNAL MEDICINE

## 2018-09-11 NOTE — ASSESSMENT & PLAN NOTE
Secondary to volume overload  Breath sounds are not diminished today  However would recommend that she undergo chest x-ray PA and lateral of possible to assess  As she is having ongoing cough and wheezing

## 2018-09-11 NOTE — PROGRESS NOTES
Assessment/Plan:     Problem List Items Addressed This Visit        Respiratory    Obstructive sleep apnea (Chronic)     Patient does not want to use CPAP therapy  She did tolerate the use initially in the hospital however currently is refusing  Bilateral pleural effusion     Secondary to volume overload  Breath sounds are not diminished today  However would recommend that she undergo chest x-ray PA and lateral of possible to assess  As she is having ongoing cough and wheezing  Other    SOB (shortness of breath) - Primary     No clear evidence of underlying infectious cause of her cough and wheezing  Would recommend that she be started on nebulizer treatment with DuoNeb 3 times daily  If patient improves then can change to p r n  Her shortness of breath may also be related to increasing volume  Consider increasing diuresis as patient has increased lower extremity edema and weight also appears to be increased  Spirometry performed in the office today shows no evidence of obstructive defect  There is evidence of restriction however CT chest has been done during her prior hospitalization and there is no evidence of interstitial lung disease therefore this restriction is likely secondary to body habitus  Relevant Orders    POCT spirometry (Completed)            Return in about 1 month (around 10/11/2018)  All questions are answered to the patient's satisfaction and understanding  She verbalizes understanding  She is encouraged to call with any further questions or concerns  Portions of the record may have been created with voice recognition software  Occasional wrong word or "sound a like" substitutions may have occurred due to the inherent limitations of voice recognition software  Read the chart carefully and recognize, using context, where substitutions have occurred      Electronically Signed by Alex Park MD    ______________________________________________________________________    Chief Complaint:   Chief Complaint   Patient presents with    Follow-up     hfu: PE   pts sisiter stated she has some gurgling in throat    Shortness of Breath     occurs with allergies/ weather    Cough       Patient ID: Ramandeep Salas is a 70 y o  y o  female has a past medical history of Atrial fibrillation ([facility] [facility] ); Bipolar disorder ([facility] ); Disease of thyroid gland; Heart failure ([facility] ); Hyperlipidemia; Hypertension; Irregular heart beat; Multiple sclerosis ([facility] ); Paralysis ([facility] [address] ); Psychiatric disorder; Schizophrenia ([facility] ); and Stroke ([facility] )  9/11/2018  Patient presents for follow-up visit post hospitalization  Most recently she has been having coughing, wheezing  No fevers +sick contacts at the nursing facility  It has been hot and humid- this bothers her MS     no fevers  No history of asthma or COPD  Aspiration precautions because of difficulty swallowing  Has a history of AJITH- does not want to wear the CPAP  Swelling is much improved  Records from nursing facility are reviewed and it appears that patient's weight is increased  Cough   This is a recurrent problem  The current episode started in the past 7 days  The problem has been gradually worsening  The cough is productive of sputum  Associated symptoms include shortness of breath and wheezing  Pertinent negatives include no fever, heartburn, postnasal drip, sore throat or weight loss  She has tried nothing for the symptoms  There is no history of environmental allergies  Review of Systems   Constitutional: Negative for fever and weight loss  HENT: Negative for postnasal drip and sore throat  Eyes: Negative for visual disturbance  Respiratory: Positive for cough, shortness of breath and wheezing  Cardiovascular: Positive for leg swelling  Gastrointestinal: Negative for abdominal distention, diarrhea, heartburn and nausea     Endocrine: Negative for polyuria  Genitourinary: Negative for difficulty urinating  Musculoskeletal: Positive for gait problem  Skin: Negative for color change  Allergic/Immunologic: Negative for environmental allergies  Neurological: Positive for weakness  Hematological: Negative for adenopathy  Psychiatric/Behavioral: Negative for agitation and behavioral problems  Smoking history: She reports that she has never smoked  She has never used smokeless tobacco     There is no immunization history on file for this patient    Current Outpatient Prescriptions   Medication Sig Dispense Refill    acetaminophen (TYLENOL) 325 mg tablet Take 650 mg by mouth every 6 (six) hours as needed for mild pain      ARIPiprazole (ABILIFY) 30 mg tablet Take 30 mg by mouth daily      atorvastatin (LIPITOR) 10 mg tablet Take 10 mg by mouth daily      bisacodyl (FLEET) 10 MG/30ML ENEM Insert 10 mg into the rectum once      cholecalciferol 1000 units tablet Take 1 tablet (1,000 Units total) by mouth daily  0    Cranberry 450 MG CAPS Take 1 capsule by mouth daily        Dextromethorphan-Guaifenesin (ROBITUSSIN DM PO) Take 10 mL by mouth every 8 (eight) hours      divalproex sodium (DEPAKOTE ER) 250 mg 24 hr tablet Take 250 mg by mouth daily      docusate sodium (COLACE) 100 mg capsule Take 200 mg by mouth daily at bedtime      furosemide (LASIX) 40 mg tablet Take 1 tablet (40 mg total) by mouth daily  0    levothyroxine 137 mcg tablet Take 1 tablet (137 mcg total) by mouth every other day  0    levothyroxine 150 mcg tablet Take 1 tablet (150 mcg total) by mouth every other day  0    losartan (COZAAR) 25 mg tablet Take 1 tablet (25 mg total) by mouth daily  0    magnesium hydroxide (MILK OF MAGNESIA) 400 mg/5 mL oral suspension Take by mouth daily as needed for constipation      nystatin (MYCOSTATIN) powder Apply 1 application topically 3 (three) times a day 15 g 0    polyethylene glycol (MIRALAX) 17 g packet Take 17 g by mouth daily      polyvinyl alcohol (LIQUIFILM TEARS) 1 4 % ophthalmic solution Administer 1 drop to both eyes 2 (two) times a day      pregabalin (LYRICA) 50 mg capsule Take 50 mg by mouth 2 (two) times a day      rivaroxaban (XARELTO) 15 mg tablet Take 15 mg by mouth      senna-docusate sodium (SENOKOT S) 8 6-50 mg per tablet Take 2 tablets by mouth daily        spironolactone (ALDACTONE) 25 mg tablet Take 1 tablet (25 mg total) by mouth daily  0    tamsulosin (FLOMAX) 0 4 mg Take 0 8 mg by mouth daily at bedtime      thiamine (VITAMIN B1) 100 mg tablet Take 100 mg by mouth daily      divalproex sodium (DEPAKOTE ER) 500 mg 24 hr tablet Take 500 mg by mouth daily at bedtime         No current facility-administered medications for this visit  Allergies: Ciprocinonide [fluocinolone]; Darvon [propoxyphene]; Keflex [cephalexin]; Lithium; Penicillins; Sulfa antibiotics; Zithromax [azithromycin]; and Ethylenediamine tetra-acetate [edetic acid]    Objective:  Vitals:    09/11/18 1027   BP: 116/68   BP Location: Right arm   Patient Position: Sitting   Cuff Size: Extra-Large   Pulse: 87   Resp: 12   SpO2: 95%   Weight: 102 kg (225 lb)   Height: 5' 5" (1 651 m)   Oxygen Therapy  SpO2: 95 %    Wt Readings from Last 3 Encounters:   09/11/18 102 kg (225 lb)   08/28/18 104 kg (229 lb 15 oz)   08/24/18 110 kg (242 lb 8 1 oz)     Body mass index is 37 44 kg/m²  Physical Exam   Constitutional: She appears well-nourished  No distress  HENT:   Head: Atraumatic  Mouth/Throat: Oropharyngeal exudate present  Eyes: EOM are normal    Neck: Neck supple  Cardiovascular: Normal rate and regular rhythm  Pulmonary/Chest: Effort normal and breath sounds normal    Abdominal: Soft  Bowel sounds are normal    Musculoskeletal: She exhibits edema  She exhibits no deformity  Neurological: She is alert  Skin: Skin is warm and dry  Psychiatric: She has a normal mood and affect   Her behavior is normal    Vitals reviewed  Diagnostics:  Ct Chest Abdomen Pelvis Wo Contrast    Result Date: 8/20/2018  Narrative: CT CHEST, ABDOMEN AND PELVIS WITHOUT IV CONTRAST INDICATION:   Sepsis  COMPARISON:  CT abdomen and pelvis 11/3/2011 TECHNIQUE: CT examination of the chest, abdomen and pelvis was performed without intravenous contrast   Axial, sagittal, and coronal 2D reformatted images were created from the source data and submitted for interpretation  Radiation dose length product (DLP) for this visit:  1744 26 mGy-cm   This examination, like all CT scans performed in the [facility], was performed utilizing techniques to minimize radiation dose exposure, including the use of iterative reconstruction and automated exposure control  Enteric contrast was administered  FINDINGS: CHEST LUNGS:  Bilateral lower lobe passive atelectasis noted with subtle groundglass upper lobe opacities likely inflammatory and/or infectious  PLEURA:  Moderate to large bilateral pleural effusions, right greater than left  HEART/GREAT VESSELS:  The heart is enlarged with no pericardial effusion  MEDIASTINUM AND DEBO:  No pathologic lymphadenopathy  There is reflux of contrast into the midesophagus  CHEST WALL AND LOWER NECK:   Unremarkable  ABDOMEN LIVER/BILIARY TREE:  Trace perihepatic ascites  No discrete mass on this nonenhanced study nor biliary ductal dilatation  GALLBLADDER:  Gallbladder is surgically absent  SPLEEN:  Unremarkable  PANCREAS:  Unremarkable  ADRENAL GLANDS:  Unremarkable  KIDNEYS/URETERS:  Chronic right renal atrophy  No hydronephrosis or perinephric collections  STOMACH AND BOWEL:  Constipation with no bowel obstruction  APPENDIX:  A normal appendix was visualized  ABDOMINOPELVIC CAVITY:  Minimal ascites predominantly in the upper abdomen and bilateral flanks  VESSELS:  Unremarkable for patient's age  PELVIS REPRODUCTIVE ORGANS:  Patient is status post hysterectomy  Prominent right ovary is a stable finding  URINARY BLADDER:  Catheterized, thick wall bladder noted with mild pericystic inflammation  Cystitis is not excluded  ABDOMINAL WALL/INGUINAL REGIONS:  3rd spacing of fluid concerning for anasarca in the lower abdomen and pelvis  OSSEOUS STRUCTURES:  No acute fracture or destructive osseous lesion  There is multilevel degenerative disc disease of the scoliotic spine  Impression: 1  Bilateral pleural effusions, right greater than left, with lower lobe compressive atelectasis  Scattered groundglass infiltrates likely infectious or inflammatory  2   Trace ascites with mild anasarca in the lower abdominal wall and pelvis  3   Constipation  Workstation performed: [id number]     Xr Chest Portable    Result Date: 8/27/2018  Narrative: CHEST INDICATION:   f/u CHF  COMPARISON:  Chest radiographs August 25, 2018 EXAM PERFORMED/VIEWS:  XR CHEST PORTABLE FINDINGS: The heart is enlarged  Atherosclerotic changes in the aorta  Lung volumes diminished  Improving vascular congestion  Elevation of right hemidiaphragm  Bibasilar atelectasis  Osseous structures appear within normal limits for patient age  Postoperative changes cervical spine  Impression: Resolving vascular congestion  Workstation performed: [id number]     Xr Chest Portable    Result Date: 8/26/2018  Narrative: CHEST INDICATION:   Follow up pleural effusions  COMPARISON:  8/22/2018 EXAM PERFORMED/VIEWS:  XR CHEST PORTABLE FINDINGS: Cardiomediastinal silhouette appears unremarkable  There is mild vascular congestion though improved  Pleural effusions also appear improved  Osseous structures appear within normal limits for patient age  Impression: Continued improvement of congestive change and pleural effusions  Workstation performed: [id number]     X-ray Chest 1 View Portable    Result Date: 8/18/2018  Narrative: CHEST INDICATION:   resp distress  COMPARISON:  AP chest 4/4/2014   EXAM PERFORMED/VIEWS:  XR CHEST PORTABLE FINDINGS: Stable cardiac and mediastinal contours  Prominent central vasculature and bilateral airspace disease likely on the basis of pulmonary edema  Small bibasilar pleural effusions  Osseous structures appear within normal limits for patient age  Impression: Prominent central vasculature and bilateral airspace disease likely on the basis of pulmonary edema  Small bibasilar pleural effusions  Workstation performed: [id number]     Ct Head Without Contrast    Result Date: 8/18/2018  Narrative: CT BRAIN - WITHOUT CONTRAST INDICATION:   lethargic  Patient found unresponsive at nursing home  COMPARISON:  3/12/2014  TECHNIQUE:  CT examination of the brain was performed  In addition to axial images, coronal 2D reformatted images were created and submitted for interpretation  Radiation dose length product (DLP) for this visit:  1195 01 mGy-cm   This examination, like all CT scans performed in the [facility], was performed utilizing techniques to minimize radiation dose exposure, including the use of iterative reconstruction and automated exposure control  IMAGE QUALITY:  Diagnostic  FINDINGS: PARENCHYMA: Decreased attenuation is noted in periventricular and subcortical white matter demonstrating an appearance that is statistically most likely to represent mild microangiopathic change  No CT signs of acute infarction  No intracranial mass, mass effect or midline shift  No acute parenchymal hemorrhage  VENTRICLES AND EXTRA-AXIAL SPACES:  Normal for the patient's age  VISUALIZED ORBITS AND PARANASAL SINUSES:  Unremarkable  CALVARIUM AND EXTRACRANIAL SOFT TISSUES:  Normal      Impression: No acute intracranial abnormality  Microangiopathic changes  Workstation performed: [id number]     Mri Brain W Wo Contrast    Result Date: 8/24/2018  Narrative: MRI BRAIN WITH AND WITHOUT CONTRAST INDICATION: ams, r/o mets, h/o mets in thoracic spine  History of demyelinating disease  COMPARISON:  12/30/2016   TECHNIQUE: Sagittal T1, axial T2, axial FLAIR, axial T1, axial Lucas, axial diffusion  Sagittal, axial T1 postcontrast   Axial bravo postcontrast with coronal reconstructions  IV Contrast:  5 mL of Gadobutrol injection (SINGLE-DOSE)  IMAGE QUALITY:   Examination limited by patient motion  FINDINGS: BRAIN PARENCHYMA:  Right matter hyperintensities are seen within the periventricular white matter bilaterally consistent with chronic demyelinating disease, possibly with superimposed chronic microangiopathic change  This appears grossly stable  Brainstem and cerebellum demonstrate normal signal   Diffusion imaging is unremarkable with no signs of acute ischemia  Normal corpus callosum and hypothalamus  Mild volume loss of the cerebral hemispheres, brainstem and cerebellum  Postcontrast imaging of the brain demonstrates no abnormal enhancement  VENTRICLES:  Stable mild ventricular enlargement with particular enlargement of the atria of the lateral ventricles  This is likely related to the degree of chronic white matter change and cerebral volume loss  SELLA AND PITUITARY GLAND:  Normal  ORBITS:  Normal  PARANASAL SINUSES:  Normal  VASCULATURE:  Evaluation of the major intracranial vasculature demonstrates appropriate flow voids  CALVARIUM AND SKULL BASE:  Normal  EXTRACRANIAL SOFT TISSUES:  Normal      Impression: Stable chronic white matter changes consistent with chronic demyelinating disease, possibly with superimposed chronic microangiopathic change  No acute ischemia, mass or hemorrhage  Workstation performed: [id number]     Xr Chest Portable Icu    Result Date: 8/22/2018  Narrative: CHEST INDICATION:   CHF  COMPARISON:  8/20/2018 EXAM PERFORMED/VIEWS:  XR CHEST PORTABLE ICU FINDINGS: Heart shadow is enlarged but unchanged from prior exam  Mild improvement in congestive changes with bibasilar densities and pleural effusions  Osseous structures appear within normal limits for patient age       Impression: Mild improvement in congestive changes with bibasilar densities and pleural effusions  Workstation performed: [id number]     Xr Chest Portable Icu    Result Date: 8/20/2018  Narrative: CHEST INDICATION:   CHF/pneumonia  COMPARISON:  8/19/2018 EXAM PERFORMED/VIEWS:  XR CHEST PORTABLE ICU FINDINGS: Heart shadow is enlarged but unchanged from prior exam  Bilateral pleural effusions are now evident with bibasilar airspace opacities and pulmonary congestion  No improvement has occurred  There is no pneumothorax Osseous structures appear within normal limits for patient age  Impression: Bilateral pleural effusions with bibasilar airspace disease and pulmonary congestion  There has been no interval improvement Workstation performed: [id number]     Xr Chest Portable Icu    Result Date: 8/20/2018  Narrative: CHEST INDICATION:   Pneumonia  COMPARISON:  8/18/2018 EXAM PERFORMED/VIEWS:  XR CHEST PORTABLE ICU FINDINGS: Heart shadow is enlarged but unchanged from prior exam  There is persistent right basilar airspace disease, similar, and linear areas of atelectasis on the left, similar  No pneumothorax  Pulmonary vascular congestion and suspected right effusion again noted  Osseous structures appear within normal limits for patient age       Impression: No significant interval change since 8/18/2018 Workstation performed: [id number]

## 2018-09-11 NOTE — ASSESSMENT & PLAN NOTE
No clear evidence of underlying infectious cause of her cough and wheezing  Would recommend that she be started on nebulizer treatment with DuoNeb 3 times daily  If patient improves then can change to p r n  Her shortness of breath may also be related to increasing volume  Consider increasing diuresis as patient has increased lower extremity edema and weight also appears to be increased  Spirometry performed in the office today shows no evidence of obstructive defect  There is evidence of restriction however CT chest has been done during her prior hospitalization and there is no evidence of interstitial lung disease therefore this restriction is likely secondary to body habitus

## 2018-09-11 NOTE — ASSESSMENT & PLAN NOTE
Patient does not want to use CPAP therapy  She did tolerate the use initially in the hospital however currently is refusing

## 2018-09-12 ENCOUNTER — OFFICE VISIT (OUTPATIENT)
Dept: CARDIOLOGY CLINIC | Facility: CLINIC | Age: 68
End: 2018-09-12
Payer: MEDICARE

## 2018-09-12 VITALS
WEIGHT: 222 LBS | DIASTOLIC BLOOD PRESSURE: 60 MMHG | HEIGHT: 65 IN | SYSTOLIC BLOOD PRESSURE: 114 MMHG | OXYGEN SATURATION: 98 % | HEART RATE: 106 BPM | BODY MASS INDEX: 36.99 KG/M2

## 2018-09-12 DIAGNOSIS — E03.9 ACQUIRED HYPOTHYROIDISM: ICD-10-CM

## 2018-09-12 DIAGNOSIS — I10 ESSENTIAL HYPERTENSION: Primary | ICD-10-CM

## 2018-09-12 DIAGNOSIS — N18.30 CHRONIC KIDNEY DISEASE, STAGE III (MODERATE) (HCC): ICD-10-CM

## 2018-09-12 DIAGNOSIS — I48.20 CHRONIC ATRIAL FIBRILLATION (HCC): ICD-10-CM

## 2018-09-12 DIAGNOSIS — E66.01 MORBID OBESITY (HCC): ICD-10-CM

## 2018-09-12 DIAGNOSIS — E55.9 VITAMIN D DEFICIENCY: ICD-10-CM

## 2018-09-12 DIAGNOSIS — G93.49 ENCEPHALOPATHY CHRONIC: ICD-10-CM

## 2018-09-12 DIAGNOSIS — G47.33 OBSTRUCTIVE SLEEP APNEA: ICD-10-CM

## 2018-09-12 DIAGNOSIS — I50.32 CHRONIC DIASTOLIC (CONGESTIVE) HEART FAILURE (HCC): ICD-10-CM

## 2018-09-12 DIAGNOSIS — D69.3 CHRONIC IDIOPATHIC THROMBOCYTOPENIA (HCC): ICD-10-CM

## 2018-09-12 DIAGNOSIS — E78.2 MIXED DYSLIPIDEMIA: ICD-10-CM

## 2018-09-12 DIAGNOSIS — G40.909 SEIZURE DISORDER (HCC): ICD-10-CM

## 2018-09-12 DIAGNOSIS — F20.1 DISORGANIZED SCHIZOPHRENIA (HCC): ICD-10-CM

## 2018-09-12 PROCEDURE — 93000 ELECTROCARDIOGRAM COMPLETE: CPT | Performed by: INTERNAL MEDICINE

## 2018-09-12 PROCEDURE — 99215 OFFICE O/P EST HI 40 MIN: CPT | Performed by: INTERNAL MEDICINE

## 2018-09-12 NOTE — PROGRESS NOTES
Cardiology Progress Note    ASSESSMENT:    1   Suboptimum control of  chronic atrial fibrillation  2   Compensated chronic diastolic heart failure with recently unremarkable echocardiogram on 08/20/2018 and nonischemic nuclear stress test on 08/27/2018 with 65% LVEF  Bilateral pleural effusions were present, but no evidence of effusions at this time  3   Stable chronic kidney disease, stage III  4  Chronic idiopathic thrombocytopenia  5  Presumed diagnosis of multiple sclerosis with cerebral demyelinating disease  6  History of seizure disorder  7  Controlled essential hypertension  8  Mixed dyslipidemia  9  History of schizophrenia  10  Chronic hypothyroidism  11  Presumed obstructive sleep apnea  12  Vitamin-D deficiency  13  History of healthcare-associated pneumonia      Plan       Patient Instructions     1  Begin metoprolol succinate 25 milligrams p o  daily to enhance rate control  2   Suggest using nebulizer treatment only as needed and not on a regular basis  3   Cardiology follow-up in 4 weeks with rhythm strip  HPI    This 70 y o  female  denies new cardiopulmonary and medical symptoms  She does complain of shakiness after receiving nebulizer treatment  Patient was hospitalized at [facility] from 08/18 through 08/28/2018 with acute on chronic diastolic heart failure, acute respiratory failure with bilateral pleural effusions, toxic metabolic encephalopathy, healthcare-associated pneumonia  Her chronic diagnoses include chronic kidney disease, stage III, chronic thrombocytopenia, multiple sclerosis, seizure disorder, chronic atrial fibrillation, essential hypertension, mixed dyslipidemia, schizophrenia, hypothyroidism, presumed obstructive sleep apnea, vitamin-D deficiency  Since her discharge 2 weeks ago, the patient's main complaint has been shakiness with nebulizer treatments    She is unable to walk and is wheelchair-bound but is able to stand for a brief amount of time       Review of Systems    All other systems negative, except as noted in history of present illness    Historical Information   Past Medical History:   Diagnosis Date    Atrial fibrillation ([facility] )     Bipolar disorder ([facility] )     Disease of thyroid gland     [facility]     afibrillation    Hyperlipidemia     Hypertension     Irregular heart beat     Multiple sclerosis (HCC)     Paralysis ([facility] )     left hemiplegia    Psychiatric disorder     depression, schizophrenia    Schizophrenia ([facility] )     Stroke ([facility] )     left hemiplegia     Past Surgical History:   Procedure Laterality Date    BACK SURGERY      2    CHOLECYSTECTOMY      COLONOSCOPY N/A 1/19/2017    Procedure: COLONOSCOPY;  Surgeon: Eusebia Cid MD;  Location: [place] [address] GI LAB; Service:     ESOPHAGOGASTRODUODENOSCOPY N/A 1/19/2017    Procedure: ESOPHAGOGASTRODUODENOSCOPY (EGD); Surgeon: Eusebia Cid MD;  Location: [place] [address] GI LAB; Service:     EYE MUSCLE SURGERY Left     HYSTERECTOMY      TONSILLECTOMY       History   Alcohol Use No     History   Drug Use No     History   Smoking Status    Never Smoker   Smokeless Tobacco    Never Used       Family History:  No family history on file  Meds/Allergies     Prior to Admission medications    Medication Sig Start Date End Date Taking?  Authorizing Provider   acetaminophen (TYLENOL) 325 mg tablet Take 650 mg by mouth every 6 (six) hours as needed for mild pain   Yes Historical Provider, MD   ARIPiprazole (ABILIFY) 30 mg tablet Take 30 mg by mouth daily   Yes Historical Provider, MD   atorvastatin (LIPITOR) 10 mg tablet Take 10 mg by mouth daily   Yes Historical Provider, MD   bisacodyl (FLEET) 10 MG/30ML ENEM Insert 10 mg into the rectum once   Yes Historical Provider, MD   cholecalciferol 1000 units tablet Take 1 tablet (1,000 Units total) by mouth daily 8/29/18  Yes Anna Lorenzo DO   Cranberry 450 MG CAPS Take 1 capsule by mouth daily     Yes Historical Provider, MD Dextromethorphan-Guaifenesin (ROBITUSSIN DM PO) Take 10 mL by mouth every 8 (eight) hours   Yes Historical Provider, MD   divalproex sodium (DEPAKOTE ER) 250 mg 24 hr tablet Take 250 mg by mouth daily   Yes Historical Provider, MD   divalproex sodium (DEPAKOTE ER) 500 mg 24 hr tablet Take 500 mg by mouth daily at bedtime     Yes Historical Provider, MD   docusate sodium (COLACE) 100 mg capsule Take 200 mg by mouth daily at bedtime   Yes Historical Provider, MD   furosemide (LASIX) 40 mg tablet Take 1 tablet (40 mg total) by mouth daily 8/29/18  Yes Vickie Trujillo, DO   levothyroxine 137 mcg tablet Take 1 tablet (137 mcg total) by mouth every other day 8/29/18  Yes Lorkim Trujillo, DO   levothyroxine 150 mcg tablet Take 1 tablet (150 mcg total) by mouth every other day 8/30/18  Yes Anna Lorenzo, DO   magnesium hydroxide (MILK OF MAGNESIA) 400 mg/5 mL oral suspension Take by mouth daily as needed for constipation   Yes Historical Provider, MD   nystatin (MYCOSTATIN) powder Apply 1 application topically 3 (three) times a day 8/28/18  Yes Vickie Trujillo,    polyethylene glycol (MIRALAX) 17 g packet Take 17 g by mouth daily   Yes Historical Provider, MD   pregabalin (LYRICA) 50 mg capsule Take 50 mg by mouth 2 (two) times a day   Yes Historical Provider, MD   rivaroxaban (XARELTO) 15 mg tablet Take 15 mg by mouth   Yes Historical Provider, MD   senna-docusate sodium (SENOKOT S) 8 6-50 mg per tablet Take 2 tablets by mouth daily     Yes Historical Provider, MD   spironolactone (ALDACTONE) 25 mg tablet Take 1 tablet (25 mg total) by mouth daily 8/29/18  Yes Vickie Trujillo,    tamsulosin (FLOMAX) 0 4 mg Take 0 8 mg by mouth daily at bedtime   Yes Historical Provider, MD   thiamine (VITAMIN B1) 100 mg tablet Take 100 mg by mouth daily   Yes Historical Provider, MD   losartan (COZAAR) 25 mg tablet Take 1 tablet (25 mg total) by mouth daily  Patient not taking: Reported on 9/12/2018 8/29/18   Vickie Trujillo DO polyvinyl alcohol (LIQUIFILM TEARS) 1 4 % ophthalmic solution Administer 1 drop to both eyes 2 (two) times a day  9/12/18  Historical Provider, MD       Allergies   Allergen Reactions    Ciprocinonide [Fluocinolone] Itching    Darvon [Propoxyphene] Itching    Keflex [Cephalexin] Rash    Lithium Itching    Penicillins Itching    Sulfa Antibiotics Hives    Zithromax [Azithromycin] Rash    Aspirin     Ethylenediamine Tetra-Acetate [Edetic Acid] Itching    Other          Vitals:    09/12/18 0834   BP: 114/60   BP Location: Right arm   Patient Position: Sitting   Cuff Size: Large   Pulse: (!) 106 and irregularly irregular   SpO2: 98%   Weight: 101 kg (222 lb)   Height: 5' 5" (1 651 m)       Body mass index is 36 94 kg/m²    8 pound weight reduction compared to 08/28/2018    Physical Exam:    General Appearance:  Alert, cooperative, no distress, appears stated age and is moderately obese   Head:  Normocephalic, without obvious abnormality, atraumatic   Eyes:  PERRL, conjunctiva/corneas clear, EOM's intact,   both eyes   Ears:  Normal TM's and external ear canals, both ears   Nose: Nares normal, septum midline, mucosa normal, no drainage or sinus tenderness   Throat: Lips, mucosa, and tongue normal; teeth and gums normal   Neck: Supple, symmetrical, trachea midline, no adenopathy, thyroid: not enlarged, symmetric, no tenderness/mass/nodules, no carotid bruit or JVD   Back:   Symmetric, no curvature, ROM normal, no CVA tenderness   Lungs:   Clear to auscultation bilaterally, respirations unlabored   Chest Wall:  No tenderness or deformity   Heart:  Irregularly irregular cardiac rhythm, S1, S2 normal, no murmur, rub or gallop   Abdomen:   Soft, non-tender, bowel sounds active all four quadrants,  no masses, no organomegaly with marked obesity noted   Extremities: Extremities normal, atraumatic, no cyanosis with trace left pretibial edema   Pulses: 2+ and symmetric   Skin: Skin showed normal color, texture, turgor and no rashes or lesions   Lymph nodes: Cervical, supraclavicular, and axillary nodes normal   Neurologic: Normal         Cardiographics    ECG  9/12/18:    Atrial fibrillation with rapid ventricular rate averaging 106-108 bpm  IVCD  Leftward frontal plane axis  Nonspecific lateral T inversions  Much faster ventricular rate compared to 08/18/2018    Imaging    Chest X-Ray 08/27/2018 :  Resolving vascular congestion          Lab Review       Lab Results   Component Value Date     08/28/2018    K 3 7 08/28/2018    CL 99 (L) 08/28/2018    CO2 37 (H) 08/28/2018    ANIONGAP 13 8 12/09/2015    BUN 36 (H) 08/28/2018    CREATININE 1 82 (H) 08/28/2018    GLUCOSE 84 12/09/2015    CALCIUM 9 1 08/28/2018    AST 21 08/19/2018    ALT 27 08/19/2018    ALKPHOS 111 08/19/2018    PROT 7 2 12/09/2015    BILITOT 0 4 12/09/2015    EGFR 28 08/28/2018       Lab Results   Component Value Date    CHOL 160 12/09/2015    CHOL 125 (L) 10/06/2015     Lab Results   Component Value Date    HDL 40 08/02/2016    HDL 39 (L) 04/05/2016    HDL 48 12/09/2015     Lab Results   Component Value Date    LDLCALC 55 08/02/2016    LDLCALC 62 04/05/2016    LDLCALC 76 12/09/2015     Lab Results   Component Value Date    TRIG 180 (H) 08/02/2016    TRIG 154 (H) 04/05/2016    TRIG 179 12/09/2015     No components found for: CHOLHDL    Lab Results   Component Value Date    GLUCOSE 84 12/09/2015    CALCIUM 9 1 08/28/2018     08/28/2018    K 3 7 08/28/2018    CO2 37 (H) 08/28/2018    CL 99 (L) 08/28/2018    BUN 36 (H) 08/28/2018    CREATININE 1 82 (H) 08/28/2018     43 minutes spent in office with review of hospital and old records, review of laboratory data, review of current and old EKG, as well as interview and examination of patient  Marianna Linder MD

## 2018-09-12 NOTE — PATIENT INSTRUCTIONS
1  Begin metoprolol succinate 25 milligrams p o  daily to enhance rate control  2   Suggest using nebulizer treatment only as needed and not on a regular basis  3   Cardiology follow-up in 4 weeks with rhythm strip

## 2018-09-19 ENCOUNTER — OFFICE VISIT (OUTPATIENT)
Dept: HEMATOLOGY ONCOLOGY | Facility: MEDICAL CENTER | Age: 68
End: 2018-09-19
Payer: MEDICARE

## 2018-09-19 VITALS
RESPIRATION RATE: 18 BRPM | HEART RATE: 78 BPM | DIASTOLIC BLOOD PRESSURE: 60 MMHG | OXYGEN SATURATION: 97 % | SYSTOLIC BLOOD PRESSURE: 116 MMHG | TEMPERATURE: 96.5 F

## 2018-09-19 DIAGNOSIS — M89.9 BONE LESION: Primary | ICD-10-CM

## 2018-09-19 PROCEDURE — 99214 OFFICE O/P EST MOD 30 MIN: CPT | Performed by: INTERNAL MEDICINE

## 2018-09-19 NOTE — PROGRESS NOTES
Bassam Cali  [birth year]  [address] [facility] [address] [place] [id number]    DISCUSSION  SUMMARY:    69-year-old female with multiple medical problems recently found to have abnormalities on MRI T spine that were correlated with a bone scan  Issues:     1  T9 and 10 lesions seen previously  There are new lesions and T 6, 7 and 8  As before, the concern was metastatic disease  Recent CT scans did not demonstrate any clear primary lesion/mass  Radiologist reading the recent MRI suggested a PET-CT  On not sure this would lend additional information  We discussed options  I will present the case to interventional Radiology (to see if additional scanning verses a biopsy of the spine is warranted)  As before, Mrs Oralia Marcelo understands that the concern is a malignancy  Patient is to return in 1 month but this may change depending upon the above  2   Questionable left breast lesions  Recent bilateral mammogram and ultrasound were WNL  As before, the question has been that patient has an occult primary with thoracic spine metastases (no evidence for this at this time)      3  CBC abnormalities, mild anemia, thrombocytopenia and mild leukopenia in the past  The most recent CBC demonstrated similar findings  Surveillance is ongoing      If Dr Conner Man or any other healthcare professional has any questions regarding this patient, my cell phone number is [phone]  Patient knows to call the hematology/oncology office if there are any other questions or concerns  Carefully review your medication list and verify that the list is accurate and up-to-date  Please call the hematology/oncology office if there are medications missing from the list, medications on the list that you are not currently taking or if there is a dosage or instruction that is different from how you're taking that medication      Patient goals and areas of care:  IR evaluation  Patient is not able to self-care   _____________________________________________________________________________________    Chief Complaint   Patient presents with    Follow-up     Bone lesions     History of Present Illness:    65-year-old female previously referred for the above  Mrs Hiram Phillips has multiple sclerosis and is either bed or wheelchair bound  Patient is back for follow-up  Mrs Hiram Phillips states feeling okay today  Previously patient seen lethargic but today she is very alert and responsive  No recent active MS issues  Back pain is the same as before  Activities are extremely limited but no different than before  Appetite is good, patient continues tried to lose weight  No GI,  or GYN issues  No fevers or signs of infection  No problems with excessive bruising or bleeding    Review of Systems   Constitutional: Positive for fatigue  HENT: Negative  Eyes: Negative  Respiratory: Negative  Cardiovascular: Negative  Gastrointestinal: Negative  Endocrine: Negative  Genitourinary: Negative  Musculoskeletal: Positive for arthralgias  Skin: Negative  Allergic/Immunologic: Negative  Neurological: Negative  Hematological: Negative  Psychiatric/Behavioral: Negative  All other systems reviewed and are negative      Patient Active Problem List   Diagnosis    Anemia    Thrombocytopenia (HCC)    Multiple sclerosis (HCC)    Seizure disorder (HCC)    Bilateral pleural effusion    Bradycardia    Abnormal MRI, thoracic spine    Chronic atrial fibrillation (HCC)    Bone lesion    Breast neoplasm    Cerebral infarction due to cerebral artery occlusion (HCC)    Cervical spinal stenosis    Depression    Essential hypertension    Lumbosacral spinal stenosis    Manic depressive disorder ([address]    Mixed hyperlipidemia    [facility]    Hypothyroidism    Obstructive sleep apnea    Chronic kidney disease, stage 3    Vitamin D deficiency    SOB (shortness of breath)     Past Medical History:   Diagnosis Date    [facility] fibrillation ([facility] )     Bipolar disorder ([facility] )     Disease of thyroid gland     Heart failure ([facility] )     afibrillation    Hyperlipidemia     Hypertension     Irregular heart beat     Multiple sclerosis (HCC)     Paralysis (HCC)     left hemiplegia    Psychiatric disorder     depression, schizophrenia    [facility]     Stroke ([facility] )     left hemiplegia     Past Surgical History:   Procedure Laterality Date    BACK SURGERY      2    CHOLECYSTECTOMY      COLONOSCOPY N/A 1/19/2017    Procedure: COLONOSCOPY;  Surgeon: Mary Anne Aragon MD;  Location: [place] [address] GI LAB; Service:     ESOPHAGOGASTRODUODENOSCOPY N/A 1/19/2017    Procedure: ESOPHAGOGASTRODUODENOSCOPY (EGD); Surgeon: Mary Anne Aragon MD;  Location: [place] [address] GI LAB; Service:     EYE MUSCLE SURGERY Left     HYSTERECTOMY      TONSILLECTOMY       No family history on file  Social History     Social History    Marital status:      Spouse name: N/A    Number of children: N/A    Years of education: N/A     Occupational History    Not on file       Social History Main Topics    Smoking status: Never Smoker    Smokeless tobacco: Never Used    Alcohol use No    Drug use: No    Sexual activity: Not on file     Other Topics Concern    Not on file     Social History Narrative    No narrative on file       Current Outpatient Prescriptions:     acetaminophen (TYLENOL) 325 mg tablet, Take 650 mg by mouth every 6 (six) hours as needed for mild pain, Disp: , Rfl:     ARIPiprazole (ABILIFY) 30 mg tablet, Take 30 mg by mouth daily, Disp: , Rfl:     atorvastatin (LIPITOR) 10 mg tablet, Take 10 mg by mouth daily, Disp: , Rfl:     bisacodyl (FLEET) 10 MG/30ML ENEM, Insert 10 mg into the rectum once, Disp: , Rfl:     cholecalciferol 1000 units tablet, Take 1 tablet (1,000 Units total) by mouth daily, Disp: , Rfl: 0    Cranberry 450 MG CAPS, Take 1 capsule by mouth daily , Disp: , Rfl:     Dextromethorphan-Guaifenesin (ROBITUSSIN DM PO), Take 10 mL by mouth every 8 (eight) hours, Disp: , Rfl:     divalproex sodium (DEPAKOTE ER) 250 mg 24 hr tablet, Take 250 mg by mouth daily, Disp: , Rfl:     divalproex sodium (DEPAKOTE ER) 500 mg 24 hr tablet, Take 500 mg by mouth daily at bedtime  , Disp: , Rfl:     docusate sodium (COLACE) 100 mg capsule, Take 200 mg by mouth daily at bedtime, Disp: , Rfl:     furosemide (LASIX) 40 mg tablet, Take 1 tablet (40 mg total) by mouth daily, Disp: , Rfl: 0    levothyroxine 137 mcg tablet, Take 1 tablet (137 mcg total) by mouth every other day, Disp: , Rfl: 0    levothyroxine 150 mcg tablet, Take 1 tablet (150 mcg total) by mouth every other day, Disp: , Rfl: 0    losartan (COZAAR) 25 mg tablet, Take 1 tablet (25 mg total) by mouth daily, Disp: , Rfl: 0    magnesium hydroxide (MILK OF MAGNESIA) 400 mg/5 mL oral suspension, Take by mouth daily as needed for constipation, Disp: , Rfl:     nystatin (MYCOSTATIN) powder, Apply 1 application topically 3 (three) times a day, Disp: 15 g, Rfl: 0    polyethylene glycol (MIRALAX) 17 g packet, Take 17 g by mouth daily, Disp: , Rfl:     pregabalin (LYRICA) 50 mg capsule, Take 50 mg by mouth 2 (two) times a day, Disp: , Rfl:     rivaroxaban (XARELTO) 15 mg tablet, Take 15 mg by mouth, Disp: , Rfl:     senna-docusate sodium (SENOKOT S) 8 6-50 mg per tablet, Take 2 tablets by mouth daily  , Disp: , Rfl:     spironolactone (ALDACTONE) 25 mg tablet, Take 1 tablet (25 mg total) by mouth daily, Disp: , Rfl: 0    tamsulosin (FLOMAX) 0 4 mg, Take 0 8 mg by mouth daily at bedtime, Disp: , Rfl:     thiamine (VITAMIN B1) 100 mg tablet, Take 100 mg by mouth daily, Disp: , Rfl:     Allergies   Allergen Reactions    Ciprocinonide [Fluocinolone] Itching    Darvon [Propoxyphene] Itching    Keflex [Cephalexin] Rash    Lithium Itching    Penicillins Itching    Sulfa Antibiotics Hives    Zithromax [Azithromycin] Rash    Aspirin     Ethylenediamine Tetra-Acetate [Edetic Acid] Itching    Other        Vitals:    09/19/18 1343   BP: 116/60   Pulse: 78   Resp: 18   Temp: (!) 96 5 °F (35 8 °C)   SpO2: 97%     Physical Exam   Constitutional: She appears well-developed and well-nourished  Obese female, no respiratory distress   HENT:   Head: Normocephalic and atraumatic  Right Ear: External ear normal    Left Ear: External ear normal    Nose: Nose normal    Mouth/Throat: Oropharynx is clear and moist    Eyes: Conjunctivae and EOM are normal  Pupils are equal, round, and reactive to light  Neck: Normal range of motion  Neck supple  Cardiovascular: Normal rate, regular rhythm, normal heart sounds and intact distal pulses  Pulmonary/Chest: Effort normal and breath sounds normal    Lungs with distant breath sounds bilaterally, few scattered rhonchi   Abdominal: Soft  Bowel sounds are normal    Abdomen is soft, morbidly obese, cannot palpate liver or spleen, no rigidity or rebound, +bowel sounds   Musculoskeletal: Normal range of motion  Neurological:   Motor and sensory is significantly decreased in all extremities, patient's mental status seems less clear today, speech is slurred   Skin: Skin is warm  Skin is warm, moist, no petechiae or ecchymoses   Psychiatric: She has a normal mood and affect   Her behavior is normal  Judgment and thought content normal    Extremities:  1-2+ bilateral lower extremity edema, no cords, pulses are 1+  Lymphatics:  No adenopathy in the neck, supraclavicular region, axilla and groin bilaterally    Labs:    09/5/18 BUN = 31 creatinine = 1 5 calcium = 8 6  08/24/2018 WBC = 6 8 hemoglobin = 10 8 hematocrit = 34 MCV = 96 platelet = 484    6/23/11 INR = 2 7  8/17/17 WBC = 5 2 hemoglobin = 13 7 hematocrit = 42 platelet = 895  7/8/55 platelet = 62  0/31/98 WBC = 3 6 hemoglobin = 12 9 hematocrit = 39 platelet = 98    Imaging    08/24/2018 MRI brain    Stable chronic white matter changes consistent with chronic demyelinating disease, possibly with superimposed chronic microangiopathic change  No acute ischemia, mass or hemorrhage  08/20/2018 CT scan of the chest and abdomen/pelvis    1  Bilateral pleural effusions, right greater than left, with lower lobe compressive atelectasis  Scattered groundglass infiltrates likely infectious or inflammatory  2   Trace ascites with mild anasarca in the lower abdominal wall and pelvis  3   Constipation  08/06/2018 MRI thoracic spine  Impression stated development of abnormal marrow at T6, T7 and T8  Persistent abnormal marrow at T10  Findings suspicious for metastatic disease  Correlation with repeat PET-CT may be warranted  03/22/2018 diagnostic bilateral mammogram was category 2, benign  03/22/2018 left breast ultrasound limited was category 2, benign  3/21/17 bilateral mammogram was category 2, benign  1/26/17 three-phase bone scan whole body demonstrated focal radiotracer activity in the lower thoracic spine corresponding to prior MRI abnormalities  Radiologist stated that the findings were nonspecific and inflammation/infection or metastases were possibilities  There were no additional lesions that were concerning for osseous metastatic disease  12/30/16 MRI thoracic spine: No intrinsic spinal cord disease was identified  Patient had advanced multilevel degenerative spondylosis  There was abnormal marrow at T10 and to a lesser degree T9 suspicious for metastatic disease

## 2018-09-24 ENCOUNTER — OFFICE VISIT (OUTPATIENT)
Dept: NEUROLOGY | Facility: CLINIC | Age: 68
End: 2018-09-24
Payer: MEDICARE

## 2018-09-24 VITALS — SYSTOLIC BLOOD PRESSURE: 128 MMHG | HEART RATE: 88 BPM | DIASTOLIC BLOOD PRESSURE: 78 MMHG

## 2018-09-24 DIAGNOSIS — C80.1 MALIGNANCY (HCC): ICD-10-CM

## 2018-09-24 DIAGNOSIS — G35 HISTORY OF MULTIPLE SCLEROSIS (HCC): Primary | ICD-10-CM

## 2018-09-24 DIAGNOSIS — G35 DEMENTIA DUE TO MULTIPLE SCLEROSIS (HCC): ICD-10-CM

## 2018-09-24 DIAGNOSIS — E55.9 VITAMIN D DEFICIENCY: ICD-10-CM

## 2018-09-24 DIAGNOSIS — R53.82 CHRONIC FATIGUE: ICD-10-CM

## 2018-09-24 DIAGNOSIS — F02.80 DEMENTIA DUE TO MULTIPLE SCLEROSIS (HCC): ICD-10-CM

## 2018-09-24 DIAGNOSIS — D64.9 ANEMIA, UNSPECIFIED TYPE: ICD-10-CM

## 2018-09-24 DIAGNOSIS — C79.51 SPINE METASTASIS (HCC): ICD-10-CM

## 2018-09-24 PROCEDURE — 99214 OFFICE O/P EST MOD 30 MIN: CPT | Performed by: PSYCHIATRY & NEUROLOGY

## 2018-09-24 RX ORDER — METOPROLOL SUCCINATE 25 MG/1
50 TABLET, EXTENDED RELEASE ORAL EVERY MORNING
COMMUNITY
End: 2019-01-24 | Stop reason: DRUGHIGH

## 2018-09-24 NOTE — PROGRESS NOTES
Return NeuroOutpatient Note        Ewelina Shirley  [id number]  82 y o   1950       Multiple Sclerosis; Neurologic Problem (H/O Seizures); and Stroke        History obtained from:  Patient     HPI/Subjective:  Ewelina Shirley is a 69 yo F with PMH of MS for 40 years was seen at [facility] on 8/23  Patient had presented with sob, cyanosis and confusion  She had initially presented with sob and cyanosis  Her clinical status was not thought to be due to MS  Patient is however found to have multiple thoracic spine lesions that are metastatic  She sees Dr Cholo Adorno  Thus far, no primary source of malignancy has been found  Bone marrow biopsy has been discussed  She doesn't report dysphagia anymore  There was questionable breast lesion  We had   Today during encounter, she is communicating well  She does mention excessive tiredness  She wakes up at 4am  She goes to sleep at 9pm    Her Vit D was low at 4  She was given supplement in hospital      She has h/o seizure disorder and is on depakote  Her depakote and ammonia levels were wnl         Past Medical History:   Diagnosis Date    Abnormal posture     Acute respiratory failure with hypercapnia (HCC)     Arthropathy, unspecified     Atrial fibrillation (HCC)     Bipolar disorder (HCC)     Cerebral infarction due to thrombosis of other precerebral artery (HCC)     Cerebral infarction due to unspecified occlusion or stenosis of unspecified cerebral artery (HCC)     Chronic diastolic congestive heart failure (HCC)     Chronic kidney disease, stage III (moderate)     Chronic pulmonary edema     Difficulty in walking, not elsewhere classified     Disease of thyroid gland     Dysphagia, oropharyngeal phase     Heart failure (HCC)     afibrillation    Hyperlipidemia     Hypertension     Irregular heart beat     Multiple sclerosis (HCC)     Muscle weakness (generalized)     Other cerebrovascular disease     Paralysis (HCC)     left hemiplegia    Primary generalized (osteo)arthritis     Psychiatric disorder     depression, schizophrenia    Schizophren[facility]     Sepsis, unspecified organism ([facility] )     Spinal stenosis, site unspecified     Stroke ([facility] )     left hemiplegia    Thrombocytopenia, unspecified ([place] [address] )      Social History     Social History    Marital status:      Spouse name: N/A    Number of children: N/A    Years of education: N/A     Occupational History    Not on file       Social History Main Topics    Smoking status: Never Smoker    Smokeless tobacco: Never Used    Alcohol use No    Drug use: No    Sexual activity: Not on file     Other Topics Concern    Not on file     Social History Narrative    No narrative on file     Family History   Problem Relation Age of Onset    Diabetes Mother     Breast cancer Mother     Stroke Father     Heart attack Father     No Known Problems Son      Allergies   Allergen Reactions    Ciprocinonide [Fluocinolone] Itching    Darvon [Propoxyphene] Itching    Keflex [Cephalexin] Rash    Lithium Itching    Penicillins Itching    Sulfa Antibiotics Hives    Zithromax [Azithromycin] Rash    Aspirin     Ethylenediamine Tetra-Acetate [Edetic Acid] Itching    Other      DAIRY     Current Outpatient Prescriptions on File Prior to Visit   Medication Sig Dispense Refill    ARIPiprazole (ABILIFY) 30 mg tablet Take 30 mg by mouth daily      atorvastatin (LIPITOR) 10 mg tablet Take 10 mg by mouth daily      Cranberry 450 MG CAPS Take 1 capsule by mouth daily        divalproex sodium (DEPAKOTE ER) 250 mg 24 hr tablet Take 500 mg by mouth daily at bedtime        divalproex sodium (DEPAKOTE ER) 250 mg 24 hr tablet Take 250 mg by mouth daily      docusate sodium (COLACE) 100 mg capsule Take 200 mg by mouth daily at bedtime      furosemide (LASIX) 40 mg tablet Take 1 tablet (40 mg total) by mouth daily  0    levothyroxine 137 mcg tablet Take 1 tablet (137 mcg total) by mouth every other day  0    levothyroxine 150 mcg tablet Take 1 tablet (150 mcg total) by mouth every other day  0    polyethylene glycol (MIRALAX) 17 g packet Take 17 g by mouth daily      pregabalin (LYRICA) 50 mg capsule Take 50 mg by mouth 2 (two) times a day      rivaroxaban (XARELTO) 15 mg tablet Take 15 mg by mouth      senna-docusate sodium (SENOKOT S) 8 6-50 mg per tablet Take 2 tablets by mouth daily        spironolactone (ALDACTONE) 25 mg tablet Take 1 tablet (25 mg total) by mouth daily  0    tamsulosin (FLOMAX) 0 4 mg Take 0 8 mg by mouth daily at bedtime      thiamine (VITAMIN B1) 100 mg tablet Take 100 mg by mouth daily      [DISCONTINUED] cholecalciferol 1000 units tablet Take 1 tablet (1,000 Units total) by mouth daily  0    acetaminophen (TYLENOL) 325 mg tablet Take 650 mg by mouth every 6 (six) hours as needed for mild pain      bisacodyl (FLEET) 10 MG/30ML ENEM Insert 10 mg into the rectum once      Dextromethorphan-Guaifenesin (ROBITUSSIN DM PO) Take 10 mL by mouth every 8 (eight) hours      losartan (COZAAR) 25 mg tablet Take 1 tablet (25 mg total) by mouth daily (Patient not taking: Reported on 9/24/2018 )  0    magnesium hydroxide (MILK OF MAGNESIA) 400 mg/5 mL oral suspension Take by mouth daily as needed for constipation      nystatin (MYCOSTATIN) powder Apply 1 application topically 3 (three) times a day 15 g 0     No current facility-administered medications on file prior to visit  Review of Systems   Refer to positive review of systems in HPI     Constitutional- No fever  Eyes- No visual change  ENT- Hearing normal  CV- No chest pain  Resp- No Shortness of breath  GI- No diarrhea  - Bladder normal  MS- No Arthritis   Skin- No rash  Psych- No depression  Endo- No DM  Heme- No nodes    Vitals:    09/24/18 1310   BP: 128/78   BP Location: Right arm   Patient Position: Sitting   Pulse: 88       PHYSICAL EXAM:  Appearance: No Acute Distress  Ophthalmoscopic: Disc Flat, Normal fundus  Mental status:  Orientation: Awake, Alert, and Orientedx3  Memory: Registation 3/3 Recall 1/3  Attention: normal  Knowledge: good  Language: No aphasia  Speech: No dysarthria  Cranial Nerves:  2 No Visual Defect on Confrontation, Pupils round, equal, reactive to light  3,4,6 Extraocular Movements Intact, no nystagmus  5 Facial Sensation Intact  7 No facial asymmetry  8 Intact hearing  9,10 Palate symmetric, normal gag  11 Good shoulder shrug  12 Tongue Midline  Gait: kamila, non ambulatory   Coordination: No ataxia with finger to nose testing, and heel to shin  Sensory: Intact, Symmetric to pinprick, light touch, vibration, and joint position  Muscle Tone: Normal              Muscle exam:  Arm Right Left Leg Right Left   Deltoid 4+/5 4+/5 Iliopsoas 2/5 2/5   Biceps 5/5 5/5 Quads 2/5 2/5   Triceps 5/5 5/5 Hamstrings 2/5 2/5   Wrist Extension 5/5 5/5 Ankle Dorsi Flexion 2/5 2/5   Wrist Flexion 5/5 5/5 Ankle Plantar Flexion 2/5 2/5   Interossei 5/5 5/5 Ankle Eversion 2/5 2/5   APB 5/5 5/5 Ankle Inversion 2/5 2/5       Reflexes   RJ BJ TJ KJ AJ Plantars Ramirez's   Right 2+ 2+ 2+ 2+ 0 Downgoing Not present   Left 2+ 2+ 2+ 2+ 0 Downgoing Not present     Personal review of  Labs:                    Diagnoses and all orders for this visit:      1  History of multiple sclerosis     2  Chronic fatigue     3  Vitamin D deficiency  cholecalciferol 5000 units TABS   4  Spine metastasis ([facility] )     5  Malignancy (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[facility])         Patient is experiencing fatigue and tiredness but with her recent h/o metastatic disease, her sxs are very likely coming from underlying malignancy, anemia  For MS, typically we use ritalin for fatigue but with her particular history and contributing factors, we would not consider that  Prescribing Vit D 5000 units/daily for found def during hospitalization  Her MS is not active at this point     Resume f/u with   Herrera Chin            Counseling Documentation:  The patient and/or patient's family were  counseled regarding diagnostic results  Instructions for management,risk factor reductions,prognosis of disease were discussed  Patient and family were educated regarding impressions,risks and benefits of treatment options,importance of compliance with treatment        Total time of encounter:  30 min  More than 50% of the time was used in counseling and/or coordination of care  Extent of counseling and/or coordination of care        Tala Jaime MD  [facility]  [address] [place] [address] [facility]  [phone]

## 2018-10-24 ENCOUNTER — OFFICE VISIT (OUTPATIENT)
Dept: CARDIOLOGY CLINIC | Facility: CLINIC | Age: 68
End: 2018-10-24
Payer: MEDICARE

## 2018-10-24 VITALS — HEART RATE: 86 BPM | DIASTOLIC BLOOD PRESSURE: 68 MMHG | SYSTOLIC BLOOD PRESSURE: 114 MMHG | OXYGEN SATURATION: 95 %

## 2018-10-24 DIAGNOSIS — I50.32 CHRONIC DIASTOLIC HEART FAILURE (HCC): ICD-10-CM

## 2018-10-24 DIAGNOSIS — N18.30 CHRONIC KIDNEY DISEASE, STAGE III (MODERATE) (HCC): ICD-10-CM

## 2018-10-24 DIAGNOSIS — I48.20 CHRONIC ATRIAL FIBRILLATION (HCC): Primary | ICD-10-CM

## 2018-10-24 DIAGNOSIS — G40.909 SEIZURE DISORDER (HCC): ICD-10-CM

## 2018-10-24 DIAGNOSIS — I10 ESSENTIAL HYPERTENSION: ICD-10-CM

## 2018-10-24 DIAGNOSIS — D69.3 IDIOPATHIC THROMBOCYTOPENIA (HCC): ICD-10-CM

## 2018-10-24 DIAGNOSIS — E78.2 MIXED DYSLIPIDEMIA: ICD-10-CM

## 2018-10-24 DIAGNOSIS — R07.9 CHEST PAIN, UNSPECIFIED TYPE: ICD-10-CM

## 2018-10-24 DIAGNOSIS — E55.9 VITAMIN D DEFICIENCY: ICD-10-CM

## 2018-10-24 DIAGNOSIS — G47.33 OBSTRUCTIVE SLEEP APNEA: ICD-10-CM

## 2018-10-24 DIAGNOSIS — E03.9 ACQUIRED HYPOTHYROIDISM: ICD-10-CM

## 2018-10-24 DIAGNOSIS — G35 MULTIPLE SCLEROSIS (HCC): ICD-10-CM

## 2018-10-24 PROCEDURE — 93000 ELECTROCARDIOGRAM COMPLETE: CPT | Performed by: INTERNAL MEDICINE

## 2018-10-24 PROCEDURE — 99214 OFFICE O/P EST MOD 30 MIN: CPT | Performed by: INTERNAL MEDICINE

## 2018-10-24 RX ORDER — POLYVINYL ALCOHOL 14 MG/ML
1 SOLUTION/ DROPS OPHTHALMIC AS NEEDED
COMMUNITY
End: 2019-01-07

## 2018-10-24 NOTE — PATIENT INSTRUCTIONS
1  Continue current management  2  Would use antacid for future chest pain, as patient had normal nuclear stress test just 2 months ago and therefore has no evidence of ischemic heart disease  3  Cardiology follow-up in 2 months with rhythm strip

## 2018-10-24 NOTE — PROGRESS NOTES
Cardiology Progress Note    ASSESSMENT:    1  Well controlled  chronic atrial fibrillation  2   Compensated chronic diastolic heart failure with recently unremarkable echocardiogram on 08/20/2018 and nonischemic nuclear stress test on 08/27/2018 with 65% LVEF  Bilateral pleural effusions were present, but no evidence of effusions at this time or on recent chest x-ray dated 10/10/2018  3   Stable chronic kidney disease, stage III  4  Chronic idiopathic thrombocytopenia  5  Presumed diagnosis of multiple sclerosis with cerebral demyelinating disease and chronic fatigue  6   History of seizure disorder  7  Controlled essential hypertension  8  Mixed dyslipidemia  9  History of schizophrenia  10  Chronic hypothyroidism  11  Presumed obstructive sleep apnea  12  Vitamin-D deficiency  13  History of healthcare-associated pneumonia  14   Episode of substernal chest pain about 3 weeks ago, probably representing esophageal reflux  15  Class 2 obesity  16  Lesions of T6-T10, being followed by Dr Radha Ryan, presumed to represent metastatic disease  Plan       Patient Instructions     1  Continue current management  2  Would use antacid for future chest pain, as patient had normal nuclear stress test just 2 months ago and therefore has no evidence of ischemic heart disease  3  Cardiology follow-up in 2 months with rhythm strip  HPI    This 70 y o  female  denies new cardiopulmonary and medical symptoms  Three weeks ago she was given 3 nitroglycerin tablets per because of substernal chest pain, that the patient thought was heartburn  She is known to have had a normal nuclear stress test 2 months ago  The patient also had chest x-ray on 10/10/2018 and lab work on 10/15/2018, both which are detailed below        Review of Systems    All other systems negative, except as noted in history of present illness    Historical Information   Past Medical History:   Diagnosis Date    Abnormal posture     Acute respiratory failure with hypercapnia (HCC)     Arthropathy, unspecified     Atrial fibrillation (HCC)     Bipolar disorder (HCC)     Cerebral infarction due to thrombosis of other precerebral artery (HCC)     Cerebral infarction due to unspecified occlusion or stenosis of unspecified cerebral artery (HCC)     Chronic diastolic congestive heart failure (HCC)     Chronic kidney disease, stage III (moderate) (HCC)     Chronic pulmonary edema     Difficulty in walking, not elsewhere classified     Disease of thyroid gland     Dysphagia, oropharyngeal phase     Heart failure (HCC)     afibrillation    Hyperlipidemia     Hypertension     Irregular heart beat     Multiple sclerosis (HCC)     Muscle weakness (generalized)     Other cerebrovascular disease     Paralysis (HCC)     left hemiplegia    Primary generalized (osteo)arthritis     Psychiatric disorder     depression, schizophrenia    Schizophrenia [facility] )     Sepsis, unspecified organism ([facility] )     Spinal stenosis, site unspecified     Stroke ([facility] )     left hemiplegia    Thrombocytopenia, unspecified ([facility] )      Past Surgical History:   Procedure Laterality Date    BACK SURGERY      2    CHOLECYSTECTOMY      COLONOSCOPY N/A 1/19/2017    Procedure: COLONOSCOPY;  Surgeon: Valeria Smith MD;  Location: [facility]; Service:     ESOPHAGOGASTRODUODENOSCOPY N/A 1/19/2017    Procedure: ESOPHAGOGASTRODUODENOSCOPY (EGD); Surgeon: Valeria Smith MD;  Location: [facility];   Service:     EYE MUSCLE SURGERY Left     HYSTERECTOMY      TONSILLECTOMY       History   Alcohol Use No     History   Drug Use No     History   Smoking Status    Never Smoker   Smokeless Tobacco    Never Used       Family History:  Family History   Problem Relation Age of Onset    Diabetes Mother     Breast cancer Mother     Stroke Father     Heart attack Father     No Known Problems Son          Meds/Allergies     Prior to Admission medications    Medication Sig Start Date End Date Taking?  Authorizing Provider   acetaminophen (TYLENOL) 325 mg tablet Take 650 mg by mouth every 6 (six) hours as needed for mild pain   Yes Historical Provider, MD   ARIPiprazole (ABILIFY) 30 mg tablet Take 30 mg by mouth daily   Yes Historical Provider, MD   atorvastatin (LIPITOR) 10 mg tablet Take 10 mg by mouth daily   Yes Historical Provider, MD   cholecalciferol 5000 units TABS Take 1,000 Units by mouth daily 9/24/18  Yes Majo Thomas MD   Cranberry 450 MG CAPS Take 1 capsule by mouth daily     Yes Historical Provider, MD   divalproex sodium (DEPAKOTE ER) 250 mg 24 hr tablet Take 500 mg by mouth daily at bedtime     Yes Historical Provider, MD   divalproex sodium (DEPAKOTE ER) 250 mg 24 hr tablet Take 250 mg by mouth daily   Yes Historical Provider, MD   docusate sodium (COLACE) 100 mg capsule Take 200 mg by mouth daily at bedtime   Yes Historical Provider, MD   furosemide (LASIX) 40 mg tablet Take 1 tablet (40 mg total) by mouth daily 8/29/18  Yes Katherin Carrero, DO   Ipratropium-Albuterol (DUONEB IN) Inhale 3 (three) times a day as needed   Yes Historical Provider, MD   levothyroxine 137 mcg tablet Take 1 tablet (137 mcg total) by mouth every other day 8/29/18  Yes Katherin Carrero,    levothyroxine 150 mcg tablet Take 1 tablet (150 mcg total) by mouth every other day 8/30/18  Yes Anna Lorenzo, DO   metoprolol succinate (TOPROL-XL) 25 mg 24 hr tablet Take 25 mg by mouth daily   Yes Historical Provider, MD   polyethylene glycol (MIRALAX) 17 g packet Take 17 g by mouth daily   Yes Historical Provider, MD   polyvinyl alcohol (LIQUIFILM TEARS) 1 4 % ophthalmic solution 1 drop as needed for dry eyes   Yes Historical Provider, MD   pregabalin (LYRICA) 50 mg capsule Take 50 mg by mouth 2 (two) times a day   Yes Historical Provider, MD   rivaroxaban (XARELTO) 15 mg tablet Take 15 mg by mouth   Yes Historical Provider, MD   senna-docusate sodium (SENOKOT S) 8 6-50 mg per tablet Take 2 tablets by mouth daily     Yes Historical Provider, MD   spironolactone (ALDACTONE) 25 mg tablet Take 1 tablet (25 mg total) by mouth daily 8/29/18  Yes Anna Lorenzo DO   tamsulosin (FLOMAX) 0 4 mg Take 0 8 mg by mouth daily at bedtime   Yes Historical Provider, MD   thiamine (VITAMIN B1) 100 mg tablet Take 100 mg by mouth daily   Yes Historical Provider, MD   bisacodyl (FLEET) 10 MG/30ML ENEM Insert 10 mg into the rectum once    Historical Provider, MD   Dextromethorphan-Guaifenesin (ROBITUSSIN DM PO) Take 10 mL by mouth every 8 (eight) hours    Historical Provider, MD   magnesium hydroxide (MILK OF MAGNESIA) 400 mg/5 mL oral suspension Take by mouth daily as needed for constipation    Historical Provider, MD   nystatin (MYCOSTATIN) powder Apply 1 application topically 3 (three) times a day  Patient not taking: Reported on 10/24/2018  8/28/18   Anna Lorenzo DO   losartan (COZAAR) 25 mg tablet Take 1 tablet (25 mg total) by mouth daily  Patient not taking: Reported on 9/24/2018  8/29/18 10/24/18  Anna Lorenzo DO       Allergies   Allergen Reactions    Ciprocinonide [Fluocinolone] Itching    Darvon [Propoxyphene] Itching    Keflex [Cephalexin] Rash    Lithium Itching    Penicillins Itching    Sulfa Antibiotics Hives    Zithromax [Azithromycin] Rash    Aspirin     Ethylenediamine Tetra-Acetate [Edetic Acid] Itching    Other      DAIRY         Vitals:    10/24/18 0910   BP: 114/68   BP Location: Right arm   Patient Position: Sitting   Cuff Size: Large   Pulse: 86   SpO2: 95%       There is no height or weight on file to calculate BMI  Six weeks ago weight was 222 lbs with BMI 36 94      Physical Exam:    General Appearance:  Alert, cooperative, no distress, appears stated age and is markedly obese   Head:  Normocephalic, without obvious abnormality, atraumatic   Eyes:  PERRL, conjunctiva/corneas clear, EOM's intact,   both eyes   Ears:  Normal TM's and external ear canals, both ears   Nose: Nares normal, septum midline, mucosa normal, no drainage or sinus tenderness   Throat: Lips, mucosa, and tongue normal; teeth and gums normal   Neck: Supple, symmetrical, trachea midline, no adenopathy, thyroid: not enlarged, symmetric, no tenderness/mass/nodules, no carotid bruit or JVD   Back:   Symmetric, no curvature, ROM normal, no CVA tenderness   Lungs:   Clear to auscultation bilaterally, respirations unlabored   Chest Wall:  No tenderness or deformity   Heart:  Irregularly irregular cardiac rhythm, S1, S2 normal, no murmur, rub or gallop   Abdomen:   Soft, non-tender, bowel sounds active all four quadrants,  no masses, no organomegaly with marked obesity noted   Extremities: Extremities normal, atraumatic, no cyanosis or edema   Pulses: 2+ and symmetric   Skin: Skin showed normal color, texture, turgor and no rashes or lesions   Lymph nodes: Cervical, supraclavicular, and axillary nodes normal   Neurologic: Normal         Cardiographics    ECG 10/24/2018 :    Controlled atrial fibrillation with average ventricular rate of 85-86 bpm  Poor R-wave progression  Slower ventricular rate by 20 bpm and less T inversion in lateral leads compared to 09/12/2018  Imaging    Chest X-Ray 10/10/2018 :     Modest cardiomegaly  Decreased lung volumes  Nonspecific interstitial prominence with clear lung fields  Technically limited exam          Lab Review     Outside laboratory data 09/05/2018:  BUN/creatinine-31/1 5 with estimated GFR 34; normal electrolytes except CO2 35 and normal glucose    Outside laboratory data 10/15/2018:    BUN/creatinine-31/1 4 with estimated GFR 37; normal electrolytes, glucose, calcium  Lab Results   Component Value Date     08/28/2018    K 3 7 08/28/2018    CL 99 (L) 08/28/2018    CO2 37 (H) 08/28/2018    ANIONGAP 13 8 12/09/2015    BUN 36 (H) 08/28/2018    CREATININE 1 82 (H) 08/28/2018    GLUCOSE 84 12/09/2015    CALCIUM 9 1 08/28/2018    AST 21 08/19/2018    ALT 27 08/19/2018    ALKPHOS 111 08/19/2018    PROT 7 2 12/09/2015    BILITOT 0 4 12/09/2015    EGFR 28 08/28/2018       Lab Results   Component Value Date    CHOL 160 12/09/2015    CHOL 125 (L) 10/06/2015     Lab Results   Component Value Date    HDL 40 08/02/2016    HDL 39 (L) 04/05/2016    HDL 48 12/09/2015     Lab Results   Component Value Date    LDLCALC 55 08/02/2016    LDLCALC 62 04/05/2016    LDLCALC 76 12/09/2015     Lab Results   Component Value Date    TRIG 180 (H) 08/02/2016    TRIG 154 (H) 04/05/2016    TRIG 179 12/09/2015     No components found for: CHOLHDL    Lab Results   Component Value Date    GLUCOSE 84 12/09/2015    CALCIUM 9 1 08/28/2018     08/28/2018    K 3 7 08/28/2018    CO2 37 (H) 08/28/2018    CL 99 (L) 08/28/2018    BUN 36 (H) 08/28/2018    CREATININE 1 82 (H) 08/28/2018           Zenon Ortega MD

## 2018-11-01 ENCOUNTER — OFFICE VISIT (OUTPATIENT)
Dept: HEMATOLOGY ONCOLOGY | Facility: MEDICAL CENTER | Age: 68
End: 2018-11-01
Payer: MEDICARE

## 2018-11-01 VITALS
OXYGEN SATURATION: 95 % | RESPIRATION RATE: 18 BRPM | TEMPERATURE: 97.8 F | DIASTOLIC BLOOD PRESSURE: 62 MMHG | SYSTOLIC BLOOD PRESSURE: 116 MMHG | HEART RATE: 73 BPM

## 2018-11-01 DIAGNOSIS — M89.9 BONE LESION: Primary | ICD-10-CM

## 2018-11-01 PROCEDURE — 99213 OFFICE O/P EST LOW 20 MIN: CPT | Performed by: INTERNAL MEDICINE

## 2018-11-01 NOTE — PROGRESS NOTES
Mary Goss  6/30/2011  [facility] [place] [id number]    DISCUSSION  SUMMARY:    79-year-old female with multiple medical problems recently found to have abnormalities on MRI T spine that were correlated with a bone scan  Issues:     1  T9 and 10 lesions seen previously  There are new lesions and T 6, 7 and 8  As before, the concern was metastatic disease  Recent CT scans did not demonstrate any clear primary lesion/mass  Radiologist reading the recent MRI suggested a PET-CT  On not sure this would lend additional information  We re-discussed options  I previously presented the case to interventional Radiology (to see if additional scanning verses a biopsy of the spine is warranted)  Apparently 1 of the IR physicians initially agreed to biopsy  Although the specifics are not entirely clear, the biopsy never took place  I spoke with Katelin Ross, IR nurse today  She will follow-up and find out why the biopsy was not performed  A callback is pending  As before, Mrs Shyann Forte understands that the concern is a malignancy  Patient is to return in 1 month but this will change depending upon the above  2   Questionable left breast lesions  Recent bilateral mammogram and ultrasound were WNL  As before, the question has been that patient has an occult primary with thoracic spine metastases (no evidence for this at this time)      3  CBC abnormalities, mild anemia, thrombocytopenia and mild leukopenia in the past  The most recent CBC demonstrated similar findings  Surveillance is ongoing      If Dr Kiko Garcia or any other healthcare professional has any questions regarding this patient, my cell phone number is [phone]  Patient knows to call the hematology/oncology office if there are any other questions or concerns  Carefully review your medication list and verify that the list is accurate and up-to-date  Please call the hematology/oncology office if there are medications missing from the list, medications on the list that you are not currently taking or if there is a dosage or instruction that is different from how you're taking that medication  Patient goals and areas of care:  IR evaluation  Patient is not able to self-care   _____________________________________________________________________________________    Chief Complaint   Patient presents with    Follow-up     Spine lesions     History of Present Illness:    48-year-old female previously referred for the above  Mrs Emma Jane has multiple sclerosis and is either bed or wheelchair bound  Patient was supposed to of undergone a biopsy of a vertebral body and return for follow-up to discuss options with the pathology report  Specifics are not presently available but this apparently did not happen  Patient  is back for follow-up  Mrs Emma Jaen states feeling okay code was siblings before  No recent active MS issues, mental status is good  Back pain is the same as before - controlled  Activities are extremely limited but no different than before  Appetite is good, patient continues to try and lose weight  No GI,  or GYN issues  No fevers or signs of infection  No problems with excessive bruising or bleeding    Review of Systems   Constitutional: Positive for fatigue  HENT: Negative  Eyes: Negative  Respiratory: Negative  Cardiovascular: Negative  Gastrointestinal: Negative  Endocrine: Negative  Genitourinary: Negative  Musculoskeletal: Positive for arthralgias  Skin: Negative  Allergic/Immunologic: Negative  Neurological: Negative  Hematological: Negative  Psychiatric/Behavioral: Negative  All other systems reviewed and are negative      Patient Active Problem List   Diagnosis    Anemia    Thrombocytopenia ([address] )    Multiple sclerosis ([facility]    Seizure disorder (HCC)    Bilateral pleural effusion    Bradycardia    Abnormal MRI, thoracic spine    Chronic atrial fibrillation (HCC)    Bone lesion    Breast neoplasm    Cerebral infarction due to cerebral artery occlusion (HCC)    Cervical spinal stenosis    Depression    Essential hypertension    Lumbosacral spinal stenosis    Manic depressive disorder (HCC)    Mixed hyperlipidemia    Schizophrenia ([address] )    Hypothyroidism    Obstructive sleep apnea    Chronic kidney disease, stage 3 (HCC)    Vitamin D deficiency    SOB (shortness of breath)     Past Medical History:   Diagnosis Date    Abnormal posture     Acute respiratory failure with hypercapnia (HCC)     Arthropathy, unspecified     Atrial fibrillation (HCC)     Bipolar disorder (HCC)     Cerebral infarction due to thrombosis of other precerebral artery (HCC)     Cerebral infarction due to unspecified occlusion or stenosis of unspecified cerebral artery (HCC)     Chronic diastolic congestive heart failure (HCC)     Chronic kidney disease, stage III (moderate) ([facility])     Chronic pulmonary edema     Difficulty in walking, not elsewhere classified     Disease of thyroid gland     Dysphagia, oropharyngeal phase     Heart failure (HCC)     afibrillation    Hyperlipidemia     Hypertension     Irregular heart beat     Multiple sclerosis (HCC)     Muscle weakness (generalized)     Other cerebrovascular disease     Paralysis (HCC)     left hemiplegia    Primary generalized (osteo)arthritis     Psychiatric disorder     depression, schizophrenia    Schizophrenia ([address] )     Sepsis, unspecified organism ([address] )     Spinal stenosis, site unspecified     Stroke ([address] )     left hemiplegia    Thrombocytopenia, unspecified ([address] )      Past Surgical History:   Procedure Laterality Date    BACK SURGERY      2    CHOLECYSTECTOMY      COLONOSCOPY N/A 1/19/2017    Procedure: COLONOSCOPY;  Surgeon: Rozina Galloway MD;  Location: [facility];   Service:     ESOPHAGOGASTRODUODENOSCOPY N/A 1/19/2017    Procedure: ESOPHAGOGASTRODUODENOSCOPY (EGD); Surgeon: Tressa Jo MD;  Location: [facility]; Service:     EYE MUSCLE SURGERY Left     HYSTERECTOMY      TONSILLECTOMY       Family History   Problem Relation Age of Onset    Diabetes Mother    Zeinab Burton Breast cancer Mother     Stroke Father     Heart attack Father     No Known Problems Son      Social History     Social History    Marital status:      Spouse name: N/A    Number of children: N/A    Years of education: N/A     Occupational History    Not on file       Social History Main Topics    Smoking status: Never Smoker    Smokeless tobacco: Never Used    Alcohol use No    Drug use: No    Sexual activity: Not on file     Other Topics Concern    Not on file     Social History Narrative    No narrative on file       Current Outpatient Prescriptions:     acetaminophen (TYLENOL) 325 mg tablet, Take 650 mg by mouth every 6 (six) hours as needed for mild pain, Disp: , Rfl:     ARIPiprazole (ABILIFY) 30 mg tablet, Take 30 mg by mouth daily, Disp: , Rfl:     atorvastatin (LIPITOR) 10 mg tablet, Take 10 mg by mouth daily, Disp: , Rfl:     bisacodyl (FLEET) 10 MG/30ML ENEM, Insert 10 mg into the rectum once, Disp: , Rfl:     cholecalciferol 5000 units TABS, Take 1,000 Units by mouth daily, Disp: 30 tablet, Rfl: 3    Cranberry 450 MG CAPS, Take 1 capsule by mouth daily  , Disp: , Rfl:     Dextromethorphan-Guaifenesin (ROBITUSSIN DM PO), Take 10 mL by mouth every 8 (eight) hours, Disp: , Rfl:     divalproex sodium (DEPAKOTE ER) 250 mg 24 hr tablet, Take 500 mg by mouth daily at bedtime  , Disp: , Rfl:     divalproex sodium (DEPAKOTE ER) 250 mg 24 hr tablet, Take 250 mg by mouth daily, Disp: , Rfl:     docusate sodium (COLACE) 100 mg capsule, Take 200 mg by mouth daily at bedtime, Disp: , Rfl:     furosemide (LASIX) 40 mg tablet, Take 1 tablet (40 mg total) by mouth daily, Disp: , Rfl: 0    Ipratropium-Albuterol (DUONEB IN), Inhale 3 (three) times a day as needed, Disp: , Rfl:     levothyroxine 137 mcg tablet, Take 1 tablet (137 mcg total) by mouth every other day, Disp: , Rfl: 0    levothyroxine 150 mcg tablet, Take 1 tablet (150 mcg total) by mouth every other day, Disp: , Rfl: 0    magnesium hydroxide (MILK OF MAGNESIA) 400 mg/5 mL oral suspension, Take by mouth daily as needed for constipation, Disp: , Rfl:     metoprolol succinate (TOPROL-XL) 25 mg 24 hr tablet, Take 25 mg by mouth daily, Disp: , Rfl:     nystatin (MYCOSTATIN) powder, Apply 1 application topically 3 (three) times a day (Patient not taking: Reported on 10/24/2018 ), Disp: 15 g, Rfl: 0    polyethylene glycol (MIRALAX) 17 g packet, Take 17 g by mouth daily, Disp: , Rfl:     polyvinyl alcohol (LIQUIFILM TEARS) 1 4 % ophthalmic solution, 1 drop as needed for dry eyes, Disp: , Rfl:     pregabalin (LYRICA) 50 mg capsule, Take 50 mg by mouth 2 (two) times a day, Disp: , Rfl:     rivaroxaban (XARELTO) 15 mg tablet, Take 15 mg by mouth, Disp: , Rfl:     senna-docusate sodium (SENOKOT S) 8 6-50 mg per tablet, Take 2 tablets by mouth daily  , Disp: , Rfl:     spironolactone (ALDACTONE) 25 mg tablet, Take 1 tablet (25 mg total) by mouth daily, Disp: , Rfl: 0    tamsulosin (FLOMAX) 0 4 mg, Take 0 8 mg by mouth daily at bedtime, Disp: , Rfl:     thiamine (VITAMIN B1) 100 mg tablet, Take 100 mg by mouth daily, Disp: , Rfl:     Allergies   Allergen Reactions    Ciprocinonide [Fluocinolone] Itching    Darvon [Propoxyphene] Itching    Keflex [Cephalexin] Rash    Lithium Itching    Penicillins Itching    Sulfa Antibiotics Hives    Zithromax [Azithromycin] Rash    Aspirin     Ethylenediamine Tetra-Acetate [Edetic Acid] Itching    Other      DAIRY       Vitals:    11/01/18 0937   BP: 116/62   Pulse: 73   Resp: 18   Temp: 97 8 °F (36 6 °C)   SpO2: 95%     Physical Exam   Constitutional: She appears well-developed and well-nourished     Obese female, no respiratory distress   HENT:   Head: Normocephalic and atraumatic  Right Ear: External ear normal    Left Ear: External ear normal    Nose: Nose normal    Mouth/Throat: Oropharynx is clear and moist    Eyes: Pupils are equal, round, and reactive to light  Conjunctivae and EOM are normal    Neck: Normal range of motion  Neck supple  Cardiovascular: Normal rate, regular rhythm, normal heart sounds and intact distal pulses  Pulmonary/Chest: Effort normal and breath sounds normal    Lungs with distant breath sounds bilaterally, few scattered rhonchi   Abdominal: Soft  Bowel sounds are normal    Abdomen is soft, morbidly obese, cannot palpate liver or spleen, no rigidity or rebound, +bowel sounds   Musculoskeletal: Normal range of motion  Neurological:   Motor and sensory is significantly decreased in all extremities, patient's mental status seems less clear today, speech is slurred   Skin: Skin is warm  Skin is warm, moist, no petechiae or ecchymoses   Psychiatric: She has a normal mood and affect  Her behavior is normal  Judgment and thought content normal    Extremities:  1-2+ bilateral lower extremity edema, no cords, pulses are 1+  Lymphatics:  No adenopathy in the neck, supraclavicular region, axilla and groin bilaterally    Labs:    10/15/2018 BUN = 31 creatinine = 1 4  09/5/18 BUN = 31 creatinine = 1 5 calcium = 8 6  08/24/2018 WBC = 6 8 hemoglobin = 10 8 hematocrit = 34 MCV = 96 platelet = 197    6/54/41 INR = 2 7  8/17/17 WBC = 5 2 hemoglobin = 13 7 hematocrit = 42 platelet = 348  4/1/46 platelet = 62  8/31/52 WBC = 3 6 hemoglobin = 12 9 hematocrit = 39 platelet = 98    Imaging    08/24/2018 MRI brain    Stable chronic white matter changes consistent with chronic demyelinating disease, possibly with superimposed chronic microangiopathic change  No acute ischemia, mass or hemorrhage  08/20/2018 CT scan of the chest and abdomen/pelvis    1    Bilateral pleural effusions, right greater than left, with lower lobe compressive atelectasis  Scattered groundglass infiltrates likely infectious or inflammatory  2   Trace ascites with mild anasarca in the lower abdominal wall and pelvis  3   Constipation  08/06/2018 MRI thoracic spine  Impression stated development of abnormal marrow at T6, T7 and T8  Persistent abnormal marrow at T10  Findings suspicious for metastatic disease  Correlation with repeat PET-CT may be warranted  03/22/2018 diagnostic bilateral mammogram was category 2, benign  03/22/2018 left breast ultrasound limited was category 2, benign  3/21/17 bilateral mammogram was category 2, benign  1/26/17 three-phase bone scan whole body demonstrated focal radiotracer activity in the lower thoracic spine corresponding to prior MRI abnormalities  Radiologist stated that the findings were nonspecific and inflammation/infection or metastases were possibilities  There were no additional lesions that were concerning for osseous metastatic disease  12/30/16 MRI thoracic spine: No intrinsic spinal cord disease was identified  Patient had advanced multilevel degenerative spondylosis  There was abnormal marrow at T10 and to a lesser degree T9 suspicious for metastatic disease

## 2018-11-05 RX ORDER — NITROGLYCERIN 0.4 MG/1
0.4 TABLET SUBLINGUAL
COMMUNITY

## 2018-11-05 RX ORDER — MAGNESIUM HYDROXIDE/ALUMINUM HYDROXICE/SIMETHICONE 120; 1200; 1200 MG/30ML; MG/30ML; MG/30ML
30 SUSPENSION ORAL EVERY 4 HOURS PRN
COMMUNITY
End: 2019-01-07

## 2018-11-05 NOTE — PRE-PROCEDURE INSTRUCTIONS
Pre-Surgery Instructions:   Medication Instructions    acetaminophen (TYLENOL) 325 mg tablet Instructed patient per Anesthesia Guidelines   aluminum-magnesium hydroxide-simethicone (MYLANTA) 200-200-20 mg/5 mL suspension Instructed patient per Anesthesia Guidelines   ARIPiprazole (ABILIFY) 30 mg tablet Instructed patient per Anesthesia Guidelines   atorvastatin (LIPITOR) 10 mg tablet Instructed patient per Anesthesia Guidelines   bisacodyl (FLEET) 10 MG/30ML ENEM Instructed patient per Anesthesia Guidelines   cholecalciferol 5000 units TABS Instructed patient per Anesthesia Guidelines   Cranberry 450 MG CAPS Instructed patient per Anesthesia Guidelines   divalproex sodium (DEPAKOTE ER) 250 mg 24 hr tablet Instructed patient per Anesthesia Guidelines   divalproex sodium (DEPAKOTE ER) 250 mg 24 hr tablet Instructed patient per Anesthesia Guidelines   docusate sodium (COLACE) 100 mg capsule Instructed patient per Anesthesia Guidelines   furosemide (LASIX) 40 mg tablet Instructed patient per Anesthesia Guidelines   Ipratropium-Albuterol (DUONEB IN) Instructed patient per Anesthesia Guidelines   levothyroxine 137 mcg tablet Instructed patient per Anesthesia Guidelines   levothyroxine 150 mcg tablet Instructed patient per Anesthesia Guidelines   magnesium hydroxide (MILK OF MAGNESIA) 400 mg/5 mL oral suspension Instructed patient per Anesthesia Guidelines   metoprolol succinate (TOPROL-XL) 25 mg 24 hr tablet Instructed patient per Anesthesia Guidelines   nitroglycerin (NITROSTAT) 0 4 mg SL tablet Instructed patient per Anesthesia Guidelines   nystatin (MYCOSTATIN) powder Instructed patient per Anesthesia Guidelines   polyethylene glycol (MIRALAX) 17 g packet Instructed patient per Anesthesia Guidelines   polyvinyl alcohol (LIQUIFILM TEARS) 1 4 % ophthalmic solution Instructed patient per Anesthesia Guidelines      pregabalin (LYRICA) 50 mg capsule Instructed patient per Anesthesia Guidelines   rivaroxaban (XARELTO) 15 mg tablet Instructed patient per Anesthesia Guidelines   senna-docusate sodium (SENOKOT S) 8 6-50 mg per tablet Instructed patient per Anesthesia Guidelines   spironolactone (ALDACTONE) 25 mg tablet Instructed patient per Anesthesia Guidelines   tamsulosin (FLOMAX) 0 4 mg Instructed patient per Anesthesia Guidelines   thiamine (VITAMIN B1) 100 mg tablet Instructed patient per Anesthesia Guidelines  Pre op instructions reviewed and sent via fax to charge nurse at [facility]  Instructed to take levothyroxine, depakote, metoprolol and abilifjason lindsey of [facility] medical transport  My Surgical Experience    The following information was developed to assist you to prepare for your operation  What do I need to do before coming to the hospital?   Arrange for a responsible person to drive you to and from the hospital    Arrange care for your children at home  Children are not allowed in the recovery areas of the hospital   Plan to wear clothing that is easy to put on and take off  If you are having shoulder surgery, wear a shirt that buttons or zippers in the front  Bathing  o Shower the evening before and the morning of your surgery with an antibacterial soap  Please refer to the Pre Op Showering Instructions for Surgery Patients Sheet   o Remove nail polish and all body piercing jewelry  o Do not shave any body part for at least 24 hours before surgery-this includes face, arms, legs and upper body  Food  o Nothing to eat or drink after midnight the night before your surgery   This includes candy and chewing gum  o Exception: If your surgery is after 12:00pm (noon), you may have clear liquids such as 7-Up®, ginger ale, apple or cranberry juice, Jell-O®, water, or clear broth until 8:00 am  o Do not drink milk or juice with pulp on the morning before surgery  o Do not drink alcohol 24 hours before surgery  Medicine  o Follow instructions you received from your surgeon about which medicines you may take on the day of surgery  o If instructed to take medicine on the morning of surgery, take pills with just a small sip of water  Call your prescribing doctor for specific information on what to do if you take insulin    What should I bring to the hospital?    Bring:  Sari Coffey or a walker, if you have them, for foot or knee surgery   A list of the daily medicines, vitamins, minerals, herbals and nutritional supplements you take  Include the dosages of medicines and the time you take them each day   Glasses, dentures or hearing aids   Minimal clothing; you will be wearing hospital sleepwear   Photo ID; required to verify your identity   If you have a Living Will or Power of , bring a copy of the documents   If you have an ostomy, bring an extra pouch and any supplies you use    Do not bring   Medicines or inhalers   Money, valuables or jewelry    What other information should I know about the day of surgery?  Notify your surgeons if you develop a cold, sore throat, cough, fever, rash or any other illness   Report to the Ambulatory Surgical/Same Day Surgery Unit   You will be instructed to stop at Registration only if you have not been pre-registered   Inform your  fi they do not stay that they will be asked by the staff to leave a phone number where they can be reached   Be available to be reached before surgery  In the event the operating room schedule changes, you may be asked to come in earlier or later than expected    *It is important to tell your doctor and others involved in your health care if you are taking or have been taking any non-prescription drugs, vitamins, minerals, herbals or other nutritional supplements   Any of these may interact with some food or medicines and cause a reaction

## 2018-11-06 ENCOUNTER — DOCUMENTATION (OUTPATIENT)
Dept: HEMATOLOGY ONCOLOGY | Facility: MEDICAL CENTER | Age: 68
End: 2018-11-06

## 2018-11-06 NOTE — PROGRESS NOTES
As discussed in the recent hematology/oncology office note, the lesions on the spine have increased  Patient was referred to IR for evaluation of biopsy  Dr Amy Baez reviewed the recent scans and felt it best that Dr Lindsay Valenzuela (Radiology, neuro radiology) review the patient's scans  I spoke with Dr Lindsay Valenzuela today  He is on vacation this week although he did review the scans last week  He will re-review the scans early next week and make decisions/callback about a biopsy  Final pathology results will determine treatment options but at this time, he is thinking less so towards malignancy and more so towards osteomyelitis

## 2018-11-12 ENCOUNTER — HOSPITAL ENCOUNTER (OUTPATIENT)
Facility: AMBULARY SURGERY CENTER | Age: 68
Setting detail: OUTPATIENT SURGERY
Discharge: HOME | End: 2018-11-12
Attending: OPHTHALMOLOGY | Admitting: OPHTHALMOLOGY
Payer: MEDICARE

## 2018-11-12 ENCOUNTER — ANESTHESIA (OUTPATIENT)
Dept: PERIOP | Facility: AMBULARY SURGERY CENTER | Age: 68
End: 2018-11-12
Payer: MEDICARE

## 2018-11-12 ENCOUNTER — ANESTHESIA EVENT (OUTPATIENT)
Dept: PERIOP | Facility: AMBULARY SURGERY CENTER | Age: 68
End: 2018-11-12
Payer: MEDICARE

## 2018-11-12 VITALS
HEART RATE: 87 BPM | TEMPERATURE: 96.4 F | SYSTOLIC BLOOD PRESSURE: 128 MMHG | OXYGEN SATURATION: 98 % | RESPIRATION RATE: 18 BRPM | DIASTOLIC BLOOD PRESSURE: 79 MMHG

## 2018-11-12 DIAGNOSIS — H25.011 CORTICAL AGE-RELATED CATARACT OF RIGHT EYE: Primary | ICD-10-CM

## 2018-11-12 PROCEDURE — V2632 POST CHMBR INTRAOCULAR LENS: HCPCS | Performed by: OPHTHALMOLOGY

## 2018-11-12 DEVICE — IOL SN60WF 23.5: Type: IMPLANTABLE DEVICE | Site: EYE | Status: FUNCTIONAL

## 2018-11-12 RX ORDER — BALANCED SALT SOLUTION 6.4; .75; .48; .3; 3.9; 1.7 MG/ML; MG/ML; MG/ML; MG/ML; MG/ML; MG/ML
SOLUTION OPHTHALMIC AS NEEDED
Status: DISCONTINUED | OUTPATIENT
Start: 2018-11-12 | End: 2018-11-12 | Stop reason: HOSPADM

## 2018-11-12 RX ORDER — TETRACAINE HYDROCHLORIDE 5 MG/ML
SOLUTION OPHTHALMIC AS NEEDED
Status: DISCONTINUED | OUTPATIENT
Start: 2018-11-12 | End: 2018-11-12 | Stop reason: HOSPADM

## 2018-11-12 RX ORDER — CYCLOPENTOLATE HYDROCHLORIDE 10 MG/ML
1 SOLUTION/ DROPS OPHTHALMIC
Status: COMPLETED | OUTPATIENT
Start: 2018-11-12 | End: 2018-11-12

## 2018-11-12 RX ORDER — TOBRAMYCIN 3 MG/ML
SOLUTION/ DROPS OPHTHALMIC AS NEEDED
Status: DISCONTINUED | OUTPATIENT
Start: 2018-11-12 | End: 2018-11-12 | Stop reason: HOSPADM

## 2018-11-12 RX ORDER — MIDAZOLAM HYDROCHLORIDE 1 MG/ML
INJECTION INTRAMUSCULAR; INTRAVENOUS AS NEEDED
Status: DISCONTINUED | OUTPATIENT
Start: 2018-11-12 | End: 2018-11-12 | Stop reason: SURG

## 2018-11-12 RX ORDER — KETOROLAC TROMETHAMINE 5 MG/ML
1 SOLUTION OPHTHALMIC
Status: COMPLETED | OUTPATIENT
Start: 2018-11-12 | End: 2018-11-12

## 2018-11-12 RX ORDER — PHENYLEPHRINE HCL 2.5 %
1 DROPS OPHTHALMIC (EYE)
Status: COMPLETED | OUTPATIENT
Start: 2018-11-12 | End: 2018-11-12

## 2018-11-12 RX ORDER — TOBRAMYCIN 3 MG/ML
1 SOLUTION/ DROPS OPHTHALMIC
Qty: 7.5 ML | Refills: 0
Start: 2018-11-12 | End: 2019-01-14 | Stop reason: HOSPADM

## 2018-11-12 RX ORDER — TETRACAINE HYDROCHLORIDE 5 MG/ML
1 SOLUTION OPHTHALMIC ONCE
Status: COMPLETED | OUTPATIENT
Start: 2018-11-12 | End: 2018-11-12

## 2018-11-12 RX ORDER — PREDNISOLONE ACETATE 10 MG/ML
1 SUSPENSION/ DROPS OPHTHALMIC 4 TIMES DAILY
Qty: 5 ML | Refills: 0
Start: 2018-11-12 | End: 2019-01-14 | Stop reason: HOSPADM

## 2018-11-12 RX ORDER — LIDOCAINE HYDROCHLORIDE 20 MG/ML
1 JELLY TOPICAL
Status: COMPLETED | OUTPATIENT
Start: 2018-11-12 | End: 2018-11-12

## 2018-11-12 RX ADMIN — CYCLOPENTOLATE HYDROCHLORIDE 1 DROP: 10 SOLUTION/ DROPS OPHTHALMIC at 10:48

## 2018-11-12 RX ADMIN — CYCLOPENTOLATE HYDROCHLORIDE 1 DROP: 10 SOLUTION/ DROPS OPHTHALMIC at 11:31

## 2018-11-12 RX ADMIN — MIDAZOLAM HYDROCHLORIDE 1 MG: 1 INJECTION, SOLUTION INTRAMUSCULAR; INTRAVENOUS at 12:00

## 2018-11-12 RX ADMIN — KETOROLAC TROMETHAMINE 1 DROP: 5 SOLUTION OPHTHALMIC at 11:07

## 2018-11-12 RX ADMIN — CYCLOPENTOLATE HYDROCHLORIDE 1 DROP: 10 SOLUTION/ DROPS OPHTHALMIC at 11:06

## 2018-11-12 RX ADMIN — MIDAZOLAM HYDROCHLORIDE 1 MG: 1 INJECTION, SOLUTION INTRAMUSCULAR; INTRAVENOUS at 12:10

## 2018-11-12 RX ADMIN — CYCLOPENTOLATE HYDROCHLORIDE 1 DROP: 10 SOLUTION/ DROPS OPHTHALMIC at 11:16

## 2018-11-12 RX ADMIN — LIDOCAINE HYDROCHLORIDE 1 APPLICATION: 20 JELLY TOPICAL at 10:48

## 2018-11-12 RX ADMIN — KETOROLAC TROMETHAMINE 1 DROP: 5 SOLUTION OPHTHALMIC at 10:48

## 2018-11-12 RX ADMIN — LIDOCAINE HYDROCHLORIDE 1 APPLICATION: 20 JELLY TOPICAL at 11:16

## 2018-11-12 RX ADMIN — LIDOCAINE HYDROCHLORIDE 1 APPLICATION: 20 JELLY TOPICAL at 11:07

## 2018-11-12 RX ADMIN — KETOROLAC TROMETHAMINE 1 DROP: 5 SOLUTION OPHTHALMIC at 11:31

## 2018-11-12 RX ADMIN — KETOROLAC TROMETHAMINE 1 DROP: 5 SOLUTION OPHTHALMIC at 11:16

## 2018-11-12 RX ADMIN — PHENYLEPHRINE HYDROCHLORIDE 1 DROP: 25 SOLUTION/ DROPS OPHTHALMIC at 10:48

## 2018-11-12 RX ADMIN — PHENYLEPHRINE HYDROCHLORIDE 1 DROP: 25 SOLUTION/ DROPS OPHTHALMIC at 11:31

## 2018-11-12 RX ADMIN — TETRACAINE HYDROCHLORIDE 1 DROP: 5 SOLUTION OPHTHALMIC at 10:49

## 2018-11-12 RX ADMIN — PHENYLEPHRINE HYDROCHLORIDE 1 DROP: 25 SOLUTION/ DROPS OPHTHALMIC at 11:07

## 2018-11-12 RX ADMIN — PHENYLEPHRINE HYDROCHLORIDE 1 DROP: 25 SOLUTION/ DROPS OPHTHALMIC at 11:17

## 2018-11-12 RX ADMIN — LIDOCAINE HYDROCHLORIDE 1 APPLICATION: 20 JELLY TOPICAL at 11:31

## 2018-11-12 NOTE — ANESTHESIA PREPROCEDURE EVALUATION
Review of Systems/Medical History  Patient summary reviewed  Chart reviewed  No history of anesthetic complications     Cardiovascular  Hyperlipidemia, Hypertension , Dysrhythmias , atrial fibrillation, CHF ,   Comment: SUMMARY:  -  Stress results: There was resting hypertension with an appropriate blood pressure response to stress  There was no chest pain during stress  -  ECG conclusions: The stress ECG was equivocal for ischemia  -  Perfusion imaging: The right ventricle was dilated  There was a small, mildly severe, fixed myocardial perfusion defect of the basal inferior and apical wall likely due to diaphragm attenuation   -  Gated SPECT: The calculated left ventricular ejection fraction was 65 %  Left ventricular ejection fraction was within normal limits by visual estimate  There was no left ventricular regional abnormality      IMPRESSIONS: Probably normal study after pharmacologic vasodilation  RV dilation noted  LVEF 65%  There was image artifact, without diagnostic evidence for perfusion abnormality  Left ventricular systolic function was normal    ,  Pulmonary  Sleep apnea ,        GI/Hepatic       Kidney disease CKD, Chronic kidney disease stage 3,        Endo/Other  History of thyroid disease , hypothyroidism,      GYN       Hematology  Anemia ,  Thrombocytopenia,    Musculoskeletal  Back pain , cervical pain and spinal stenosis,   Arthritis     Neurology  Seizures ,  CVA (left hemiplegia) , residual symptoms,    Psychology   Depression ,                   Anesthesia Plan  ASA Score- 3     Anesthesia Type- IV sedation with anesthesia with ASA Monitors  Additional Monitors:   Airway Plan:         Plan Factors-    Induction-     Postoperative Plan-     Informed Consent- Anesthetic plan and risks discussed with patient  I personally reviewed this patient with the CRNA  Discussed and agreed on the Anesthesia Plan with the CRNA  Karrie Nissen

## 2018-11-12 NOTE — ANESTHESIA POSTPROCEDURE EVALUATION
Post-Op Assessment Note      CV Status:  Stable    Mental Status:  Alert and awake    Hydration Status:  Stable    PONV Controlled:  None    Airway Patency:  Patent and adequate    Post Op Vitals Reviewed: Yes          Staff: CRNA           BP      Temp     Pulse     Resp      SpO2

## 2018-11-12 NOTE — OP NOTE
OPERATIVE REPORT    PATIENT NAME: Bear Black    :    MRN: [id number]  Pt Location: [place] [address] OR ROOM 01    Surgery Date: 2018    Surgeon(s) and Role:     * Carleen Oneal MD - Primary    Age-related nuclear cataract of right eye [H25 11]  Cortical age-related cataract of right eye [H25 011]    Post-Op Diagnosis Codes:     * Age-related nuclear cataract of right eye [H25 11]     * Cortical age-related cataract of right eye [H25 011]    Procedure(s):  EXTRACTION EXTRACAPSULAR CATARACT PHACO INTRAOCULAR LENS (IOL)    Anesthesia Type:   IV Sedation with Anesthesia    Operative Indications:  Age-related nuclear cataract of right eye [H25 11]  Cortical age-related cataract of right eye [H25 011]  Decreased vision to 20/40  With problems reading  Pt requested cataract sx the right eye    Procedure and Technique:    Procedure Details     The patient was brought in the OR in stable condition and placed on the operative table  The right eye was prepped and draped in the usual sterile manner  Attention was directed to the right eye where a lid speculum was placed  A 2 4 mm clear corneal incision was made temperally  1/2 cc of 1% MPF Lidocaine was irrigated into the anterior chamber followed by viscoat  The side port incision was placed superiorly  The capsularrhexis was made and the nucleus was hydrodissected with BSS  The nucleus was then removed with the phaco handpiece followed by removal of the cortical material with the I/A handpiece  The capsular bag was then filled with Provisc  The IOL was folded and placed in to the capsular bag and centered well  The remaining Provisc was removed from the eye with the I/A  The wounds were hydrated with BSS and found to be water tight  The lid speculum was removed and 2 drops of Gatifloxicin were placed over the cornea  A protective eye shield was taped over the eye and the patient went to PACU in stable condition   I will see the patient in the office tomorrow and the expected post op period is a few weeks         Complications: None        Disposition: PACU   Condition: Stable    SIGNATURE: Lilo Redman MD  DATE: November 12, 2018  TIME: 12:35 PM

## 2018-11-12 NOTE — DISCHARGE INSTRUCTIONS
Dr Yesenia Carnes Cataract Instructions    Activity:     1  No Driving until instructed   2  Keep shield on until seen tomorrow except when administering drops   3  No heavy lifting   4  No water in eye     Diet:     1  Resume normal diet    Normal Symptoms:     1  Mild Headache   2  Scratchy or picky feeling around eye    Call the office if:     1  You have any questions or concerns   2  If eye pain is not relieved by extra strength tylenol    Office phone number:  [phone]      Next appointment:     1  See Dr Yesenia Carnes at his office tomorrow as scheduled   __________________0915am________________________________________   2  Bring blue eye kit with you and eyedrops to the office    A new set of comprehensive instructions will be given and reviewed with you during your office visit tomorrow

## 2018-11-26 ENCOUNTER — TELEPHONE (OUTPATIENT)
Dept: HEMATOLOGY ONCOLOGY | Facility: MEDICAL CENTER | Age: 68
End: 2018-11-26

## 2018-11-30 ENCOUNTER — DOCUMENTATION (OUTPATIENT)
Dept: HEMATOLOGY ONCOLOGY | Facility: MEDICAL CENTER | Age: 68
End: 2018-11-30

## 2018-11-30 DIAGNOSIS — M89.9 BONE LESION: ICD-10-CM

## 2018-11-30 DIAGNOSIS — G35 MULTIPLE SCLEROSIS (HCC): Primary | ICD-10-CM

## 2018-11-30 NOTE — PROGRESS NOTES
As per Dr Burton Plummer, pt will need repeat MRI of cervical and thoracic spine  Can reschedule f/u appt for 12/3 until after these tests have been completed  Please schedule   Thank you

## 2018-12-07 NOTE — TELEPHONE ENCOUNTER
Just to make sure we are all on the same page, I had extensive discussions with Dr Carolina Alex  He does not believe that the spine lesions are malignancy, more likely osteomyelitis  He wants a follow-up MRI    At this point he does not believe that a biopsy is necessary but wishes to review the MRI results 1st   Please discuss this with the sister, thank you

## 2018-12-17 ENCOUNTER — HOSPITAL ENCOUNTER (OUTPATIENT)
Dept: RADIOLOGY | Facility: HOSPITAL | Age: 68
End: 2018-12-17
Attending: INTERNAL MEDICINE
Payer: MEDICARE

## 2018-12-17 ENCOUNTER — HOSPITAL ENCOUNTER (OUTPATIENT)
Dept: RADIOLOGY | Facility: HOSPITAL | Age: 68
Discharge: HOME | End: 2018-12-17
Attending: INTERNAL MEDICINE
Payer: MEDICARE

## 2018-12-17 DIAGNOSIS — G35 MULTIPLE SCLEROSIS (HCC): ICD-10-CM

## 2018-12-17 DIAGNOSIS — M89.9 BONE LESION: ICD-10-CM

## 2018-12-17 PROCEDURE — A9585 GADOBUTROL INJECTION: HCPCS | Performed by: INTERNAL MEDICINE

## 2018-12-17 PROCEDURE — 72157 MRI CHEST SPINE W/O & W/DYE: CPT

## 2018-12-17 PROCEDURE — 72156 MRI NECK SPINE W/O & W/DYE: CPT

## 2018-12-17 RX ADMIN — GADOBUTROL 10 ML: 604.72 INJECTION INTRAVENOUS at 12:25

## 2018-12-20 ENCOUNTER — OFFICE VISIT (OUTPATIENT)
Dept: HEMATOLOGY ONCOLOGY | Facility: MEDICAL CENTER | Age: 68
End: 2018-12-20
Payer: MEDICARE

## 2018-12-20 VITALS
DIASTOLIC BLOOD PRESSURE: 70 MMHG | RESPIRATION RATE: 16 BRPM | HEART RATE: 74 BPM | SYSTOLIC BLOOD PRESSURE: 112 MMHG | TEMPERATURE: 96.8 F

## 2018-12-20 DIAGNOSIS — D69.6 THROMBOCYTOPENIA (HCC): ICD-10-CM

## 2018-12-20 DIAGNOSIS — D64.9 ANEMIA, UNSPECIFIED TYPE: Primary | ICD-10-CM

## 2018-12-20 PROCEDURE — 99214 OFFICE O/P EST MOD 30 MIN: CPT | Performed by: INTERNAL MEDICINE

## 2018-12-20 NOTE — PROGRESS NOTES
Nathen Devers  [birth year]  [facility]  [address] [place] [id number]    DISCUSSION  SUMMARY:    76-year-old female with multiple medical problems recently found to have abnormalities on MRI T spine that were correlated with a bone scan  Issues:     1  T9 and 10 lesions seen previously  The repeat cervical spine lesions do not demonstrate any evidence of progression  Clinically patient is about the same as before  As with neuro radiology, I do not believe that a biopsy is necessary  The plan is to continue with surveillance  This was discussed at length with Mrs Rivera  Patient understands that it is still not clear what the lesions are but that it does not seem to be associated with a malignancy and a biopsy is not presently needed  That being said, the lesions will need to be re-evaluated in the future  Patient is to return in 3 months - at that time, we will make some decisions regarding the next set of scans  2   Questionable left breast lesions  Recent bilateral mammogram and ultrasound were WNL  As before, the question has been that patient has an occult primary with thoracic spine metastases (no evidence for this at this time)      3  CBC abnormalities, mild anemia, thrombocytopenia and mild leukopenia in the past  The most recent CBC demonstrated similar findings  Surveillance is ongoing  Patient is to have blood work before she returns in 3 months      If Dr Linh Keys or any other healthcare professional has any questions regarding this patient, my cell phone number is [phone]  Patient knows to call the hematology/oncology office if there are any other questions or concerns  Carefully review your medication list and verify that the list is accurate and up-to-date   Please call the hematology/oncology office if there are medications missing from the list, medications on the list that you are not currently taking or if there is a dosage or instruction that is different from how you're taking that medication  Patient goals and areas of care:  Surveillance of the spine lesions  Barriers to care:  Patient has multiple comorbidities  Patient is not able to self-care   _____________________________________________________________________________________    Chief Complaint   Patient presents with    Follow-up     Cervical and thoracic spine lesion     History of Present Illness:    39-year-old female previously referred for the above  Mrs Nilo Dias has multiple sclerosis and is either bed or wheelchair bound  Patient was supposed to of undergone a biopsy of a vertebral body and return for follow-up to discuss options with the pathology report  The case was discussed with a number of physicians, most recently, Dr Berta Butcher, neuro radiology who did not feel that a biopsy was indicated  He did not believe that the lesions were consistent with a possible metastatic disease, more concerned about infection  He requested follow-up MRIs (listed below)  Mrs Nilo Dias states feeling about the same as before, patient states being more tired and sleepy today  No recent MS issues, mental status otherwise has been good  Back pain is less/better than before, generalized body aches and right sciatica are the same as before  Activities are virtually nil, same as before  Appetite is good, no GI,  or GYN issues  No recent fevers or signs of infection  No problems with excessive bruising or bleeding  Review of Systems   Constitutional: Positive for fatigue  HENT: Negative  Eyes: Negative  Respiratory: Negative  Cardiovascular: Negative  Gastrointestinal: Negative  Endocrine: Negative  Genitourinary: Negative  Musculoskeletal: Positive for arthralgias  Skin: Negative  Allergic/Immunologic: Negative  Neurological: Negative  Hematological: Negative  Psychiatric/Behavioral: Negative      All other systems reviewed and are negative      Patient Active Problem List   Diagnosis    Anemia    Thrombocytopenia (HCC)    Multiple sclerosis (HCC)    Seizure disorder (HCC)    Bilateral pleural effusion    Bradycardia    Abnormal MRI, thoracic spine    Chronic atrial fibrillation (HCC)    Bone lesion    Breast neoplasm    Cerebral infarction due to cerebral artery occlusion (HCC)    Cervical spinal stenosis    Depression    Essential hypertension    Lumbosacral spinal stenosis    Manic depressive disorder ([address] )    Mixed hyperlipidemia    Schizophrenia ([address] )    Hypothyroidism    Obstructive sleep apnea    Chronic kidney disease, stage 3 (HCC)    Vitamin D deficiency    SOB (shortness of breath)     Past Medical History:   Diagnosis Date    Abnormal posture     Acute respiratory failure with hypercapnia (HCC)     Arthropathy, unspecified     Atrial fibrillation (HCC)     Bipolar disorder (HCC)     Cerebral infarction due to thrombosis of other precerebral artery (HCC)     Cerebral infarction due to unspecified occlusion or stenosis of unspecified cerebral artery (HCC)     Chronic diastolic congestive heart failure (HCC)     Chronic kidney disease, stage III (moderate) (HCC)     Chronic pulmonary edema     Difficulty in walking, not elsewhere classified     Disease of thyroid gland     Dysphagia, oropharyngeal phase     Heart failure (HCC)     afibrillation    Hyperlipidemia     Hypertension     Irregular heart beat     Multiple sclerosis (HCC)     Muscle weakness (generalized)     Other cerebrovascular disease     Paralysis (HCC)     left hemiplegia    Primary generalized (osteo)arthritis     Psychiatric disorder     depression, schizophrenia    Schizophrenia ([address] )     Sepsis, unspecified organism ([address] )     Spinal stenosis, site unspecified     Stroke ([address] )     left hemiplegia    Thrombocytopenia, unspecified ([address] )      Past Surgical History:   Procedure Laterality Date    BACK SURGERY      2    CHOLECYSTECTOMY      COLONOSCOPY N/A 1/19/2017    Procedure: COLONOSCOPY;  Surgeon: Kimberly Ga MD;  Location: [place] [address] [facility]; Service:     ESOPHAGOGASTRODUODENOSCOPY N/A 1/19/2017    Procedure: ESOPHAGOGASTRODUODENOSCOPY (EGD); Surgeon: Kimberly Ga MD;  Location: [place] [address] [facility]; Service:     EYE MUSCLE SURGERY Left     HYSTERECTOMY       [facility] CATARACT EXTRACAP,INSERT LENS Right 11/12/2018    Procedure: EXTRACTION EXTRACAPSULAR CATARACT PHACO INTRAOCULAR LENS (IOL); Surgeon: Consuella Brunner, MD;  [facility] MAIN OR;  Service: Ophthalmology    TONSILLECTOMY       Family History   Problem Relation Age of Onset    Diabetes Mother     Breast cancer Mother     Stroke Father     Heart attack Father     No Known Problems Son      Social History     Social History    Marital status:      Spouse name: N/A    Number of children: N/A    Years of education: N/A     Occupational History    Not on file       Social History Main Topics    Smoking status: Never Smoker    Smokeless tobacco: Never Used    Alcohol use No    Drug use: No    Sexual activity: No     Other Topics Concern    Not on file     Social History Narrative    No narrative on file       Current Outpatient Prescriptions:     ARIPiprazole (ABILIFY) 30 mg tablet, Take 30 mg by mouth every morning  , Disp: , Rfl:     atorvastatin (LIPITOR) 10 mg tablet, Take 10 mg by mouth daily, Disp: , Rfl:     cholecalciferol 5000 units TABS, Take 1,000 Units by mouth daily, Disp: 30 tablet, Rfl: 3    Cranberry 450 MG CAPS, Take 1 capsule by mouth daily  , Disp: , Rfl:     divalproex sodium (DEPAKOTE ER) 250 mg 24 hr tablet, Take 500 mg by mouth daily at bedtime  , Disp: , Rfl:     divalproex sodium (DEPAKOTE ER) 250 mg 24 hr tablet, Take 250 mg by mouth every morning  , Disp: , Rfl:     docusate sodium (COLACE) 100 mg capsule, Take 200 mg by mouth daily at bedtime, Disp: , Rfl:     furosemide (LASIX) 40 mg tablet, Take 1 tablet (40 mg total) by mouth daily, Disp: , Rfl: 0    Ipratropium-Albuterol (DUONEB IN), Inhale 3 (three) times a day as needed, Disp: , Rfl:     levothyroxine 137 mcg tablet, Take 1 tablet (137 mcg total) by mouth every other day, Disp: , Rfl: 0    levothyroxine 150 mcg tablet, Take 1 tablet (150 mcg total) by mouth every other day, Disp: , Rfl: 0    metoprolol succinate (TOPROL-XL) 25 mg 24 hr tablet, Take 25 mg by mouth every morning  , Disp: , Rfl:     nitroglycerin (NITROSTAT) 0 4 mg SL tablet, Place 0 4 mg under the tongue every 5 (five) minutes as needed for chest pain, Disp: , Rfl:     polyethylene glycol (MIRALAX) 17 g packet, Take 17 g by mouth daily, Disp: , Rfl:     prednisoLONE acetate (PRED FORTE) 1 % ophthalmic suspension, Administer 1 drop to the right eye 4 (four) times a day, Disp: 5 mL, Rfl: 0    pregabalin (LYRICA) 50 mg capsule, Take 50 mg by mouth 2 (two) times a day, Disp: , Rfl:     rivaroxaban (XARELTO) 15 mg tablet, Take 15 mg by mouth, Disp: , Rfl:     senna-docusate sodium (SENOKOT S) 8 6-50 mg per tablet, Take 2 tablets by mouth daily  , Disp: , Rfl:     spironolactone (ALDACTONE) 25 mg tablet, Take 1 tablet (25 mg total) by mouth daily, Disp: , Rfl: 0    tamsulosin (FLOMAX) 0 4 mg, Take 0 8 mg by mouth daily at bedtime, Disp: , Rfl:     thiamine (VITAMIN B1) 100 mg tablet, Take 100 mg by mouth daily, Disp: , Rfl:     tobramycin (TOBREX) 0 3 % SOLN, Administer 1 drop to the right eye every 4 (four) hours while awake, Disp: 7 5 mL, Rfl: 0    acetaminophen (TYLENOL) 325 mg tablet, Take 650 mg by mouth every 6 (six) hours as needed for mild pain, Disp: , Rfl:     aluminum-magnesium hydroxide-simethicone (MYLANTA) 200-200-20 mg/5 mL suspension, Take 30 mL by mouth every 4 (four) hours as needed for indigestion or heartburn, Disp: , Rfl:     bisacodyl (FLEET) 10 MG/30ML ENEM, Insert 10 mg into the rectum once, Disp: , Rfl:     magnesium hydroxide (MILK OF MAGNESIA) 400 mg/5 mL oral suspension, Take by mouth daily as needed for constipation, Disp: , Rfl:     nystatin (MYCOSTATIN) powder, Apply 1 application topically 3 (three) times a day (Patient not taking: Reported on 12/20/2018 ), Disp: 15 g, Rfl: 0    polyvinyl alcohol (LIQUIFILM TEARS) 1 4 % ophthalmic solution, 1 drop as needed for dry eyes, Disp: , Rfl:     Allergies   Allergen Reactions    Ciprocinonide [Fluocinolone] Itching    Darvon [Propoxyphene] Itching    Keflex [Cephalexin] Rash    Lithium Itching    Penicillins Itching    Sulfa Antibiotics Hives    Zithromax [Azithromycin] Rash    Aspirin     Ethylenediamine Tetra-Acetate [Edetic Acid] Itching    Other      DAIRY       Vitals:    12/20/18 1106   BP: 112/70   Pulse: 74   Resp: 16   Temp: (!) 96 8 °F (36 °C)     Physical Exam   Constitutional: She appears well-developed and well-nourished  Obese female, no respiratory distress, slightly more lethargic than usual   HENT:   Head: Normocephalic and atraumatic  Right Ear: External ear normal    Left Ear: External ear normal    Nose: Nose normal    Mouth/Throat: Oropharynx is clear and moist    Eyes: Pupils are equal, round, and reactive to light  Conjunctivae and EOM are normal    Neck: Normal range of motion  Neck supple  Limited range of motion but no different than before, nontender   Cardiovascular: Normal rate, regular rhythm, normal heart sounds and intact distal pulses  Pulmonary/Chest: Effort normal and breath sounds normal    Distant breath sounds bilaterally, scattered rhonchi   Abdominal: Soft  Bowel sounds are normal    Abdomen is soft, morbidly obese, cannot palpate liver or spleen, no rigidity or rebound, +bowel sounds   Musculoskeletal: Normal range of motion  Neurological:   Motor and sensory is significantly decreased in all extremities, patient's mental status seems less clear today, speech is slurred   Skin: Skin is warm     Relatively good color, warm, moist, no petechiae or ecchymoses   Psychiatric: She has a normal mood and affect  Her behavior is normal  Judgment and thought content normal    Extremities:  2+ bilateral lower extremity edema, no cords, pulses are 1+  Lymphatics:  No adenopathy in the neck, supraclavicular region, axilla and groin bilaterally    Labs    10/15/2018 BUN = 31 creatinine = 1 4  09/5/18 BUN = 31 creatinine = 1 5 calcium = 8 6  08/24/2018 WBC = 6 8 hemoglobin = 10 8 hematocrit = 34 MCV = 96 platelet = 593    1/17/01 INR = 2 7  8/17/17 WBC = 5 2 hemoglobin = 13 7 hematocrit = 42 platelet = 192  9/8/92 platelet = 62  6/85/55 WBC = 3 6 hemoglobin = 12 9 hematocrit = 39 platelet = 98    Imaging    12/17/2018 MRI cervical spine    Stable mild chronic myelomalacia of the cervical cord at the C4-5 level  Previous anterior fusion at C4-5 and C5-6 stable with mild canal stenosis and foraminal narrowing  Stable posterior element hypertrophic change at the C6-7 level with partial calcification and thickening of the ligamentum flavum  Moderate canal stenosis with mild bilateral foraminal narrowing  Stable degenerative disc disease and facet degenerative change within the remaining cervical spine    12/17/2018 MRI thoracic spine    Improving marrow changes involving the midthoracic vertebral bodies, primarily involving T7 and T10  After reviewing prior studies including MRI and CT examinations, findings are consistent with healing fractures  No new marrow edema identified  No thoracic cord lesion to suggest active demyelinating disease  Stable mild thoracic degenerative change with small disc herniations, endplate and posterior element hypertrophic change  08/24/2018 MRI brain    Stable chronic white matter changes consistent with chronic demyelinating disease, possibly with superimposed chronic microangiopathic change  No acute ischemia, mass or hemorrhage  08/20/2018 CT scan of the chest and abdomen/pelvis    1    Bilateral pleural effusions, right greater than left, with lower lobe compressive atelectasis  Scattered groundglass infiltrates likely infectious or inflammatory  2   Trace ascites with mild anasarca in the lower abdominal wall and pelvis  3   Constipation  08/06/2018 MRI thoracic spine  Impression stated development of abnormal marrow at T6, T7 and T8  Persistent abnormal marrow at T10  Findings suspicious for metastatic disease  Correlation with repeat PET-CT may be warranted  03/22/2018 diagnostic bilateral mammogram was category 2, benign  03/22/2018 left breast ultrasound limited was category 2, benign  3/21/17 bilateral mammogram was category 2, benign  1/26/17 three-phase bone scan whole body demonstrated focal radiotracer activity in the lower thoracic spine corresponding to prior MRI abnormalities  Radiologist stated that the findings were nonspecific and inflammation/infection or metastases were possibilities  There were no additional lesions that were concerning for osseous metastatic disease  12/30/16 MRI thoracic spine: No intrinsic spinal cord disease was identified  Patient had advanced multilevel degenerative spondylosis  There was abnormal marrow at T10 and to a lesser degree T9 suspicious for metastatic disease

## 2019-01-07 NOTE — PRE-PROCEDURE INSTRUCTIONS
Pre-Surgery Instructions:   Medication Instructions    acetaminophen (TYLENOL) 325 mg tablet Instructed patient per Anesthesia Guidelines   ARIPiprazole (ABILIFY) 30 mg tablet Instructed patient per Anesthesia Guidelines   atorvastatin (LIPITOR) 10 mg tablet Instructed patient per Anesthesia Guidelines   bisacodyl (FLEET) 10 MG/30ML ENEM Instructed patient per Anesthesia Guidelines   cholecalciferol 5000 units TABS Instructed patient per Anesthesia Guidelines   Cranberry 450 MG CAPS Instructed patient per Anesthesia Guidelines   divalproex sodium (DEPAKOTE ER) 250 mg 24 hr tablet Instructed patient per Anesthesia Guidelines   divalproex sodium (DEPAKOTE ER) 250 mg 24 hr tablet Instructed patient per Anesthesia Guidelines   docusate sodium (COLACE) 100 mg capsule Instructed patient per Anesthesia Guidelines   furosemide (LASIX) 40 mg tablet Instructed patient per Anesthesia Guidelines   levothyroxine 137 mcg tablet Instructed patient per Anesthesia Guidelines   levothyroxine 150 mcg tablet Instructed patient per Anesthesia Guidelines   metoprolol succinate (TOPROL-XL) 25 mg 24 hr tablet Instructed patient per Anesthesia Guidelines   nitroglycerin (NITROSTAT) 0 4 mg SL tablet Instructed patient per Anesthesia Guidelines   polyethylene glycol (MIRALAX) 17 g packet Instructed patient per Anesthesia Guidelines   prednisoLONE acetate (PRED FORTE) 1 % ophthalmic suspension Instructed patient per Anesthesia Guidelines   pregabalin (LYRICA) 50 mg capsule Instructed patient per Anesthesia Guidelines   rivaroxaban (XARELTO) 15 mg tablet Instructed patient per Anesthesia Guidelines   senna-docusate sodium (SENOKOT S) 8 6-50 mg per tablet Instructed patient per Anesthesia Guidelines   spironolactone (ALDACTONE) 25 mg tablet Instructed patient per Anesthesia Guidelines   tamsulosin (FLOMAX) 0 4 mg Instructed patient per Anesthesia Guidelines      thiamine (VITAMIN B1) 100 mg tablet Instructed patient per Anesthesia Guidelines   tobramycin (TOBREX) 0 3 % SOLN Instructed patient per Anesthesia Guidelines  Pre op instructions reviewed and fax[facility] to [facility]  Instructed to give pt levothyroxine, depakote, abilify and metoprolol a m of surgery  Medical Transport bringing pt to [facility]  My Surgical Experience    The following information was developed to assist you to prepare for your operation  What do I need to do before coming to the hospital?   Arrange for a responsible person to drive you to and from the hospital    Arrange care for your children at home  Children are not allowed in the recovery areas of the hospital   Plan to wear clothing that is easy to put on and take off  If you are having shoulder surgery, wear a shirt that buttons or zippers in the front  Bathing  o Shower the evening before and the morning of your surgery with an antibacterial soap  Please refer to the Pre Op Showering Instructions for Surgery Patients Sheet   o Remove nail polish and all body piercing jewelry  o Do not shave any body part for at least 24 hours before surgery-this includes face, arms, legs and upper body  Food  o Nothing to eat or drink after midnight the night before your surgery  This includes candy and chewing gum  o Exception: If your surgery is after 12:00pm (noon), you may have clear liquids such as 7-Up®, ginger ale, apple or cranberry juice, Jell-O®, water, or clear broth until 8:00 am  o Do not drink milk or juice with pulp on the morning before surgery  o Do not drink alcohol 24 hours before surgery  Medicine  o Follow instructions you received from your surgeon about which medicines you may take on the day of surgery  o If instructed to take medicine on the morning of surgery, take pills with just a small sip of water   Call your prescribing doctor for specific information on what to do if you take insulin    What should I bring to the hospital?    Bring:  Anisha Silva or a walker, if you have them, for foot or knee surgery   A list of the daily medicines, vitamins, minerals, herbals and nutritional supplements you take  Include the dosages of medicines and the time you take them each day   Glasses, dentures or hearing aids   Minimal clothing; you will be wearing hospital sleepwear   Photo ID; required to verify your identity   If you have a Living Will or Power of , bring a copy of the documents   If you have an ostomy, bring an extra pouch and any supplies you use    Do not bring   Medicines or inhalers   Money, valuables or jewelry    What other information should I know about the day of surgery?  Notify your surgeons if you develop a cold, sore throat, cough, fever, rash or any other illness   Report to the Ambulatory Surgical/Same Day Surgery Unit   You will be instructed to stop at Registration only if you have not been pre-registered   Inform your  fi they do not stay that they will be asked by the staff to leave a phone number where they can be reached   Be available to be reached before surgery  In the event the operating room schedule changes, you may be asked to come in earlier or later than expected    *It is important to tell your doctor and others involved in your health care if you are taking or have been taking any non-prescription drugs, vitamins, minerals, herbals or other nutritional supplements   Any of these may interact with some food or medicines and cause a reaction

## 2019-01-14 ENCOUNTER — HOSPITAL ENCOUNTER (OUTPATIENT)
Facility: AMBULARY SURGERY CENTER | Age: 69
Setting detail: OUTPATIENT SURGERY
Discharge: NURSING, REHAB OR LONG-TERM CARE | End: 2019-01-14
Attending: OPHTHALMOLOGY | Admitting: OPHTHALMOLOGY
Payer: MEDICARE

## 2019-01-14 ENCOUNTER — ANESTHESIA (OUTPATIENT)
Dept: PERIOP | Facility: AMBULARY SURGERY CENTER | Age: 69
End: 2019-01-14
Payer: MEDICARE

## 2019-01-14 ENCOUNTER — ANESTHESIA EVENT (OUTPATIENT)
Dept: PERIOP | Facility: AMBULARY SURGERY CENTER | Age: 69
End: 2019-01-14
Payer: MEDICARE

## 2019-01-14 VITALS
OXYGEN SATURATION: 94 % | BODY MASS INDEX: 36.99 KG/M2 | HEART RATE: 75 BPM | DIASTOLIC BLOOD PRESSURE: 63 MMHG | RESPIRATION RATE: 16 BRPM | SYSTOLIC BLOOD PRESSURE: 98 MMHG | HEIGHT: 65 IN | TEMPERATURE: 96.8 F | WEIGHT: 222 LBS

## 2019-01-14 DIAGNOSIS — H25.12 AGE-RELATED NUCLEAR CATARACT OF LEFT EYE: Primary | ICD-10-CM

## 2019-01-14 PROCEDURE — V2632 POST CHMBR INTRAOCULAR LENS: HCPCS | Performed by: OPHTHALMOLOGY

## 2019-01-14 DEVICE — IOL SN60WF 23.0: Type: IMPLANTABLE DEVICE | Site: EYE | Status: FUNCTIONAL

## 2019-01-14 RX ORDER — TETRACAINE HYDROCHLORIDE 5 MG/ML
SOLUTION OPHTHALMIC AS NEEDED
Status: DISCONTINUED | OUTPATIENT
Start: 2019-01-14 | End: 2019-01-14 | Stop reason: HOSPADM

## 2019-01-14 RX ORDER — PREDNISOLONE ACETATE 10 MG/ML
1 SUSPENSION/ DROPS OPHTHALMIC 4 TIMES DAILY
Qty: 5 ML | Refills: 0
Start: 2019-01-14

## 2019-01-14 RX ORDER — CYCLOPENTOLATE HYDROCHLORIDE 10 MG/ML
1 SOLUTION/ DROPS OPHTHALMIC
Status: COMPLETED | OUTPATIENT
Start: 2019-01-14 | End: 2019-01-14

## 2019-01-14 RX ORDER — PHENYLEPHRINE HCL 2.5 %
1 DROPS OPHTHALMIC (EYE)
Status: COMPLETED | OUTPATIENT
Start: 2019-01-14 | End: 2019-01-14

## 2019-01-14 RX ORDER — TOBRAMYCIN 3 MG/ML
SOLUTION/ DROPS OPHTHALMIC AS NEEDED
Status: DISCONTINUED | OUTPATIENT
Start: 2019-01-14 | End: 2019-01-14 | Stop reason: HOSPADM

## 2019-01-14 RX ORDER — MIDAZOLAM HYDROCHLORIDE 1 MG/ML
INJECTION INTRAMUSCULAR; INTRAVENOUS AS NEEDED
Status: DISCONTINUED | OUTPATIENT
Start: 2019-01-14 | End: 2019-01-14 | Stop reason: SURG

## 2019-01-14 RX ORDER — TETRACAINE HYDROCHLORIDE 5 MG/ML
1 SOLUTION OPHTHALMIC ONCE
Status: COMPLETED | OUTPATIENT
Start: 2019-01-14 | End: 2019-01-14

## 2019-01-14 RX ORDER — TOBRAMYCIN 3 MG/ML
1 SOLUTION/ DROPS OPHTHALMIC
Qty: 7.5 ML | Refills: 0
Start: 2019-01-14 | End: 2019-01-24

## 2019-01-14 RX ORDER — BALANCED SALT SOLUTION 6.4; .75; .48; .3; 3.9; 1.7 MG/ML; MG/ML; MG/ML; MG/ML; MG/ML; MG/ML
SOLUTION OPHTHALMIC AS NEEDED
Status: DISCONTINUED | OUTPATIENT
Start: 2019-01-14 | End: 2019-01-14 | Stop reason: HOSPADM

## 2019-01-14 RX ORDER — LIDOCAINE HYDROCHLORIDE 20 MG/ML
1 JELLY TOPICAL
Status: COMPLETED | OUTPATIENT
Start: 2019-01-14 | End: 2019-01-14

## 2019-01-14 RX ADMIN — LIDOCAINE HYDROCHLORIDE 1 APPLICATION: 20 JELLY TOPICAL at 07:59

## 2019-01-14 RX ADMIN — LIDOCAINE HYDROCHLORIDE 1 APPLICATION: 20 JELLY TOPICAL at 08:16

## 2019-01-14 RX ADMIN — CYCLOPENTOLATE HYDROCHLORIDE 1 DROP: 10 SOLUTION OPHTHALMIC at 07:45

## 2019-01-14 RX ADMIN — LIDOCAINE HYDROCHLORIDE 1 APPLICATION: 20 JELLY TOPICAL at 07:45

## 2019-01-14 RX ADMIN — CYCLOPENTOLATE HYDROCHLORIDE 1 DROP: 10 SOLUTION OPHTHALMIC at 08:15

## 2019-01-14 RX ADMIN — PHENYLEPHRINE HYDROCHLORIDE 1 DROP: 25 SOLUTION/ DROPS OPHTHALMIC at 08:00

## 2019-01-14 RX ADMIN — MIDAZOLAM HYDROCHLORIDE 2 MG: 1 INJECTION, SOLUTION INTRAMUSCULAR; INTRAVENOUS at 08:18

## 2019-01-14 RX ADMIN — PHENYLEPHRINE HYDROCHLORIDE 1 DROP: 25 SOLUTION/ DROPS OPHTHALMIC at 08:16

## 2019-01-14 RX ADMIN — CYCLOPENTOLATE HYDROCHLORIDE 1 DROP: 10 SOLUTION OPHTHALMIC at 07:59

## 2019-01-14 RX ADMIN — LIDOCAINE HYDROCHLORIDE 1 APPLICATION: 20 JELLY TOPICAL at 07:30

## 2019-01-14 RX ADMIN — PHENYLEPHRINE HYDROCHLORIDE 1 DROP: 25 SOLUTION/ DROPS OPHTHALMIC at 07:30

## 2019-01-14 RX ADMIN — TETRACAINE HYDROCHLORIDE 1 DROP: 5 SOLUTION OPHTHALMIC at 07:30

## 2019-01-14 RX ADMIN — CYCLOPENTOLATE HYDROCHLORIDE 1 DROP: 10 SOLUTION OPHTHALMIC at 07:30

## 2019-01-14 RX ADMIN — PHENYLEPHRINE HYDROCHLORIDE 1 DROP: 25 SOLUTION/ DROPS OPHTHALMIC at 07:45

## 2019-01-14 NOTE — ANESTHESIA POSTPROCEDURE EVALUATION
Post-Op Assessment Note      CV Status:  Stable    Mental Status:  Alert    Hydration Status:  Stable    PONV Controlled:  Controlled    Airway Patency:  Patent    Post Op Vitals Reviewed: Yes          Staff: CRNA           BP     Temp     Pulse     Resp      SpO2

## 2019-01-14 NOTE — DISCHARGE INSTRUCTIONS
Dr Sangeetha Restrepo Cataract Instructions    Activity:     1  No Driving until instructed   2  Keep shield on until seen tomorrow except when administering drops   3  No heavy lifting   4  No water in eye     Diet:     1  Resume normal diet    Normal Symptoms:     1  Mild Headache   2  Scratchy or picky feeling around eye    Call the office if:     1  You have any questions or concerns   2  If eye pain is not relieved by extra strength tylenol    Office phone number:  [phone]      Next appointment:     1  See Dr Sangeetha Restrepo at his office tomorrow as scheduled       10:00 AM   2  Bring blue eye kit with you and eyedrops to the office    A new set of comprehensive instructions will be given and reviewed with you during your office visit tomorrow

## 2019-01-14 NOTE — ANESTHESIA PREPROCEDURE EVALUATION
Review of Systems/Medical History  Patient summary reviewed  Chart reviewed  No history of anesthetic complications     Cardiovascular  Hyperlipidemia, Hypertension , Dysrhythmias , atrial fibrillation, CHF ,   Comment: LEFT VENTRICLE:  Systolic function was at the lower limits of normal by Teichholz  Ejection fraction was estimated to be 51 %  There were no regional wall motion abnormalities  There was mild concentric hypertrophy  There was no evidence of elevated ventricular filling pressure by Doppler parameters      MITRAL VALVE:  There was mild regurgitation      AORTIC VALVE:  There was no evidence for stenosis  There was mild regurgitation      TRICUSPID VALVE:  There was mild to moderate regurgitation  Pulmonary artery systolic pressure was within the normal range ,  Pulmonary  Sleep apnea ,        GI/Hepatic       Chronic kidney disease stage 3,        Endo/Other  History of thyroid disease , hypothyroidism,      GYN       Hematology  Anemia ,  Thrombocytopenia,    Musculoskeletal  Back pain , spinal stenosis,   Arthritis     Neurology  Seizures ,  CVA , residual symptoms,   Comment: Multiple sclerosis  Psychology   Depression , Schizophrenia             Physical Exam    Airway    Mallampati score: II  TM Distance: >3 FB  Neck ROM: full     Dental   No notable dental hx     Cardiovascular  Cardiovascular exam normal    Pulmonary  Pulmonary exam normal     Other Findings        Anesthesia Plan  ASA Score- 3     Anesthesia Type- IV sedation with anesthesia with ASA Monitors  Additional Monitors:   Airway Plan:         Plan Factors-    Induction-     Postoperative Plan-     Informed Consent- Anesthetic plan and risks discussed with patient

## 2019-01-14 NOTE — OP NOTE
OPERATIVE REPORT    PATIENT NAME: Alberto Rosales    :  [id number]  MRN: [id number]  Pt Location: [facility] OR ROOM 01    Surgery Date: 2019    Surgeon(s) and Role:     * Nae Willis MD - Primary    Age-related nuclear cataract of left eye [H25 12]    Post-Op Diagnosis Codes:     * Age-related nuclear cataract of left eye [H25 12]    Procedure(s):  EXTRACTION EXTRACAPSULAR CATARACT PHACO INTRAOCULAR LENS (IOL)    Anesthesia Type:   IV Sedation with Anesthesia    Operative Indications:  Age-related nuclear cataract of left eye [H25 12]  Decreased vision to 20/50  With problems reading  Pt requested cataract sx the left eye    Procedure and Technique:    Procedure Details     The patient was brought in the OR in stable condition and placed on the operative table  The left eye was prepped and draped in the usual sterile manner  Attention was directed to the left eye where a lid speculum was placed  A 2 4 mm clear corneal incision was made temperally  1/2 cc of 1% MPF Lidocaine was irrigated into the anterior chamber followed by viscoat  The side port incision was placed superiorly  The capsularrhexis was made and the nucleus was hydrodissected with BSS  The nucleus was then removed with the phaco handpiece followed by removal of the cortical material with the I/A handpiece  The capsular bag was then filled with Provisc  The IOL was folded and placed in to the capsular bag and centered well  The remaining Provisc was removed from the eye with the I/A  The wounds were hydrated with BSS and found to be water tight  The lid speculum was removed and 2 drops of Gatifloxicin were placed over the cornea  A protective eye shield was taped over the eye and the patient went to PACU in stable condition  I will see the patient in the office tomorrow and the expected post op period is a few weeks         Complications: None        Disposition: PACU   Condition: Stable    SIGNATURE: Nae Willis MD  DATE:  2019  TIME: 8:43 AM

## 2019-01-24 ENCOUNTER — OFFICE VISIT (OUTPATIENT)
Dept: CARDIOLOGY CLINIC | Facility: CLINIC | Age: 69
End: 2019-01-24
Payer: MEDICARE

## 2019-01-24 VITALS — OXYGEN SATURATION: 94 % | SYSTOLIC BLOOD PRESSURE: 102 MMHG | HEART RATE: 96 BPM | DIASTOLIC BLOOD PRESSURE: 62 MMHG

## 2019-01-24 DIAGNOSIS — I10 ESSENTIAL HYPERTENSION: ICD-10-CM

## 2019-01-24 DIAGNOSIS — D63.8 ANEMIA OF CHRONIC DISEASE: ICD-10-CM

## 2019-01-24 DIAGNOSIS — N18.30 CHRONIC KIDNEY DISEASE, STAGE III (MODERATE) (HCC): ICD-10-CM

## 2019-01-24 DIAGNOSIS — I48.20 ATRIAL FIBRILLATION, CHRONIC (HCC): Primary | ICD-10-CM

## 2019-01-24 DIAGNOSIS — E66.9 CLASS 2 OBESITY: ICD-10-CM

## 2019-01-24 DIAGNOSIS — E78.2 MIXED DYSLIPIDEMIA: ICD-10-CM

## 2019-01-24 DIAGNOSIS — I50.32 CHRONIC DIASTOLIC HEART FAILURE (HCC): ICD-10-CM

## 2019-01-24 DIAGNOSIS — D69.6 THROMBOCYTOPENIA (HCC): ICD-10-CM

## 2019-01-24 PROCEDURE — 99214 OFFICE O/P EST MOD 30 MIN: CPT | Performed by: INTERNAL MEDICINE

## 2019-01-24 PROCEDURE — 93000 ELECTROCARDIOGRAM COMPLETE: CPT | Performed by: INTERNAL MEDICINE

## 2019-01-24 RX ORDER — LEVOTHYROXINE SODIUM 0.15 MG/1
150 TABLET ORAL EVERY OTHER DAY
COMMUNITY

## 2019-01-24 RX ORDER — METOPROLOL SUCCINATE 50 MG/1
50 TABLET, EXTENDED RELEASE ORAL DAILY
Qty: 30 TABLET | Refills: 5
Start: 2019-01-24

## 2019-01-24 RX ORDER — ARIPIPRAZOLE 20 MG/1
20 TABLET ORAL DAILY
COMMUNITY

## 2019-01-24 NOTE — PROGRESS NOTES
Cardiology Progress Note    ASSESSMENT:       1  Suboptimum control of  chronic atrial fibrillation  2   Compensated chronic diastolic heart failure with previously unremarkable echocardiogram on 08/20/2018 and nonischemic nuclear stress test on 08/27/2018 with 65% LVEF   Bilateral pleural effusions were present, but no evidence of effusions at this time or on prior chest x-ray dated 10/10/2018  3   Stable chronic kidney disease, stage III with current estimated GFR 32 and creatinine of 1 6   4   Chronic idiopathic thrombocytopenia, mild  5   Presumed diagnosis of multiple sclerosis with cerebral demyelinating disease and chronic fatigue  6   History of seizure disorder  7   Controlled essential hypertension  8   Mixed dyslipidemia  9   History of schizophrenia  10   Chronic hypothyroidism, treated  11   Presumed obstructive sleep apnea  12   Vitamin-D deficiency  13   History of healthcare-associated pneumonia  14   Episode of substernal chest pain about 3 weeks ago, probably representing esophageal reflux  15  Class 2 obesity  16  Lesions of T9-T10, being followed by Dr Ruben Courtney, presumed to represent metastatic disease, but stable  17  Status post left cataract extraction with IOL 01/14/2019 and right cataract extraction with IOL 11/12/2018   Plan       Patient Instructions     1  Increase  metoprolol succinate to 50 milligrams once daily  2  Cardiology follow-up in 6 months with EKG  3  Continue other medications as currently taking  HPI    This 70 y o  female  denies new cardiopulmonary and medical symptoms  The patient is seen in follow-up of chronic atrial fibrillation, chronic diastolic heart failure, chronic kidney disease, essential hypertension, mixed dyslipidemia  Recent laboratory data was reviewed and is detailed below  A rhythm strip was obtained and is detailed below        Review of Systems    All other systems negative, except as noted in history of present illness    Historical Information   Past Medical History:   Diagnosis Date    Abnormal posture     Acute respiratory failure with hypercapnia (HCC)     Arthropathy, unspecified     Atrial fibrillation (HCC)     Bipolar disorder (HCC)     Cerebral infarction due to thrombosis of other precerebral artery (HCC)     Cerebral infarction due to unspecified occlusion or stenosis of unspecified cerebral artery (HCC)     Chronic diastolic congestive heart failure (HCC)     Chronic kidney disease, stage III (moderate) (HCC)     Chronic pulmonary edema     Difficulty in walking, not elsewhere classified     Disease of thyroid gland     Dysphagia, oropharyngeal phase     Heart failure (HCC)     afibrillation    Hyperlipidemia     Hypertension     Irregular heart beat     Multiple sclerosis (HCC)     Muscle weakness (generalized)     Other cerebrovascular disease     Paralysis (HCC)     left hemiplegia    Primary generalized (osteo)arthritis     Psychiatric disorder     depression, schizophrenia    Schizophrenia [facility] [address] [facility]     Sepsis, unspecified organism ([facility] [address] [facility]     Spinal stenosis, site unspecified     Stroke ([facility] [address] [facility]     left hemiplegia    Thrombocytopenia, unspecified ([facility] [address] )      Past Surgical History:   Procedure Laterality Date    BACK SURGERY      2    CHOLECYSTECTOMY      COLONOSCOPY N/A 1/19/2017    Procedure: COLONOSCOPY;  Surgeon: Daryl Bush MD;  Location: [place] [address] GI LAB; Service:     ESOPHAGOGASTRODUODENOSCOPY N/A 1/19/2017    Procedure: ESOPHAGOGASTRODUODENOSCOPY (EGD); Surgeon: Daryl Bush MD;  Location: [place] [address] GI LAB; Service:     EYE MUSCLE SURGERY Left     HYSTERECTOMY       [facility] CATARACT EXTRACAP,INSERT LENS Right 11/12/2018    Procedure: EXTRACTION EXTRACAPSULAR CATARACT PHACO INTRAOCULAR LENS (IOL);   Surgeon: Jerome Wallace MD;  Location: [facility] MAIN OR;  Service: Ophthalmology     [facility] CATARACT EXTRACAP,INSERT LENS Left 1/14/2019    Procedure: EXTRACTION EXTRACAPSULAR CATARACT PHACO INTRAOCULAR LENS (IOL); Surgeon: Lia Bowers MD;  Location: [facility] MAIN OR;  Service: Ophthalmology    TONSILLECTOMY       History   Alcohol Use No     History   Drug Use No     History   Smoking Status    Never Smoker   Smokeless Tobacco    Never Used       Family History:  Family History   Problem Relation Age of Onset    Diabetes Mother     Breast cancer Mother     Stroke Father     Heart attack Father     No Known Problems Son          Meds/Allergies     Prior to Admission medications    Medication Sig Start Date End Date Taking?  Authorizing Provider   acetaminophen (TYLENOL) 325 mg tablet Take 650 mg by mouth every 6 (six) hours as needed for mild pain   Yes Historical Provider, MD   ARIPiprazole (ABILIFY) 20 MG tablet Take 20 mg by mouth daily   Yes Historical Provider, MD   atorvastatin (LIPITOR) 10 mg tablet Take 10 mg by mouth daily   Yes Historical Provider, MD   bisacodyl (FLEET) 10 MG/30ML ENEM Insert 10 mg into the rectum once   Yes Historical Provider, MD   cholecalciferol 5000 units TABS Take 1,000 Units by mouth daily 9/24/18  Yes Indra Dela Cruz MD   Cranberry 450 MG CAPS Take 1 capsule by mouth daily     Yes Historical Provider, MD   divalproex sodium (DEPAKOTE ER) 250 mg 24 hr tablet Take 500 mg by mouth daily at bedtime     Yes Historical Provider, MD   divalproex sodium (DEPAKOTE ER) 250 mg 24 hr tablet Take 250 mg by mouth every morning     Yes Historical Provider, MD   docusate sodium (COLACE) 100 mg capsule Take 200 mg by mouth daily at bedtime   Yes Historical Provider, MD   furosemide (LASIX) 40 mg tablet Take 1 tablet (40 mg total) by mouth daily 8/29/18  Yes Angeli Monge DO   levothyroxine 137 mcg tablet Take 1 tablet (137 mcg total) by mouth every other day 8/29/18  Yes Angeli Monge DO   levothyroxine 150 mcg tablet Take 150 mcg by mouth every other day   Yes Historical Provider, MD   nitroglycerin (NITROSTAT) 0 4 mg SL tablet Place 0 4 mg under the tongue every 5 (five) minutes as needed for chest pain   Yes Historical Provider, MD   polyethylene glycol (MIRALAX) 17 g packet Take 17 g by mouth daily   Yes Historical Provider, MD   prednisoLONE acetate (PRED FORTE) 1 % ophthalmic suspension Administer 1 drop into the left eye 4 (four) times a day 1/14/19  Yes Figueroa Stevens MD   pregabalin (LYRICA) 50 mg capsule Take 50 mg by mouth 2 (two) times a day   Yes Historical Provider, MD   rivaroxaban (XARELTO) 15 mg tablet Take 15 mg by mouth daily with breakfast     Yes Historical Provider, MD   senna-docusate sodium (SENOKOT S) 8 6-50 mg per tablet Take 2 tablets by mouth daily     Yes Historical Provider, MD   spironolactone (ALDACTONE) 25 mg tablet Take 1 tablet (25 mg total) by mouth daily 8/29/18  Yes Sinan Juárez,    tamsulosin (FLOMAX) 0 4 mg Take 0 8 mg by mouth daily at bedtime   Yes Historical Provider, MD   levothyroxine 150 mcg tablet Take 1 tablet (150 mcg total) by mouth every other day 8/30/18 1/24/19 Yes Anna Lorenzo DO   metoprolol succinate (TOPROL-XL) 25 mg 24 hr tablet Take 50 mg by mouth every morning   1/24/19 Yes Historical Provider, MD   metoprolol succinate (TOPROL-XL) 50 mg 24 hr tablet Take 1 tablet (50 mg total) by mouth daily 1/24/19   Shanelle Winter MD   ARIPiprazole (ABILIFY) 30 mg tablet Take 30 mg by mouth every morning    1/24/19  Historical Provider, MD   thiamine (VITAMIN B1) 100 mg tablet Take 100 mg by mouth daily  1/24/19  Historical Provider, MD   tobramycin (TOBREX) 0 3 % SOLN Administer 1 drop into the left eye every 4 (four) hours while awake  Patient not taking: Reported on 1/24/2019 1/14/19 1/24/19  Figueroa Stevens MD       Allergies   Allergen Reactions    Ciprocinonide [Fluocinolone] Itching    Darvon [Propoxyphene] Itching    Keflex [Cephalexin] Rash    Lithium Itching    Penicillins Itching    Sulfa Antibiotics Hives    Zithromax [Azithromycin] Rash    Aspirin     Ethylenediamine Tetra-Acetate [Edetic Acid] Itching    Other      DAIRY         Vitals:    01/24/19 1028   BP: 102/62   BP Location: Right arm   Patient Position: Sitting   Cuff Size: Large   Pulse: 96   SpO2: 94%       There is no height or weight on file to calculate BMI  Patient is wheelchair-bound and unable to stand  Physical Exam:    General Appearance:  Alert, cooperative, no distress, appears stated age and is markedly obese  Head:  Normocephalic, without obvious abnormality, atraumatic   Eyes:  PERRL, conjunctiva/corneas clear, EOM's intact,   both eyes   Ears:  Normal TM's and external ear canals, both ears   Nose: Nares normal, septum midline, mucosa normal, no drainage or sinus tenderness   Throat: Lips, mucosa, and tongue normal; teeth and gums normal   Neck: Supple, symmetrical, trachea midline, no adenopathy, thyroid: not enlarged, symmetric, no tenderness/mass/nodules, no carotid bruit or JVD   Back:   Symmetric, no curvature, ROM normal, no CVA tenderness   Lungs:   Clear to auscultation bilaterally, respirations unlabored   Chest Wall:  No tenderness or deformity   Heart:  Irregularly irregular cardiac rhythm, S1, S2 normal, no murmur, rub or gallop   Abdomen:   Soft, non-tender, bowel sounds active all four quadrants,  no masses, no organomegaly with marked obesity noted   Extremities: Extremities normal, atraumatic, no cyanosis or edema   Pulses: 2+ and symmetric   Skin: Skin showed normal color, texture, turgor and no rashes or lesions   Lymph nodes: Cervical, supraclavicular, and axillary nodes normal   Neurologic: Normal         Cardiographics    ECG rhythm strip 01/24/2019 :    Atrial fibrillation with suboptimum rate control with average ventricular rate  bpm  IVCD  Nonspecific T abnormalities  Left axis deviation    Imaging    Chest X-Ray :  No Chest XR results available for this patient            Lab Review     Outside laboratory data:  Vitamin-D 25-OH 12/10/2018:  40 10  CMP 12/04/2018:  Normal except for BUN/creatinine of 36/1 6 with estimated GFR 32  CBC 12/04/2018:  Normal except for platelets 433S    Lab Results   Component Value Date    SODIUM 141 08/28/2018    K 3 7 08/28/2018    CL 99 (L) 08/28/2018    CO2 37 (H) 08/28/2018    ANIONGAP 13 8 12/09/2015    BUN 36 (H) 08/28/2018    CREATININE 1 82 (H) 08/28/2018    GLUCOSE 84 12/09/2015    CALCIUM 9 1 08/28/2018    AST 21 08/19/2018    ALT 27 08/19/2018    ALKPHOS 111 08/19/2018    PROT 7 2 12/09/2015    BILITOT 0 4 12/09/2015    EGFR 28 08/28/2018       Lab Results   Component Value Date    CHOLESTEROL 131 08/02/2016    CHOLESTEROL 132 04/05/2016     Lab Results   Component Value Date    HDL 40 08/02/2016    HDL 39 (L) 04/05/2016    HDL 48 12/09/2015     No results found for: LDLCHOLEST  Lab Results   Component Value Date    LDLCALC 55 08/02/2016    LDLCALC 62 04/05/2016    LDLCALC 76 12/09/2015     No components found for: [facility]  Lab Results   Component Value Date    TRIG 180 (H) 08/02/2016    TRIG 154 (H) 04/05/2016    TRIG 179 12/09/2015         Lab Results   Component Value Date    GLUCOSE 84 12/09/2015    CALCIUM 9 1 08/28/2018     12/09/2015    K 3 7 08/28/2018    CO2 37 (H) 08/28/2018    CL 99 (L) 08/28/2018    BUN 36 (H) 08/28/2018    CREATININE 1 82 (H) 08/28/2018           Dao Dunn MD

## 2019-01-24 NOTE — PATIENT INSTRUCTIONS
1   Increase  metoprolol succinate to 50 milligrams once daily  2  Cardiology follow-up in 6 months with EKG  3  Continue other medications as currently taking

## 2019-03-01 ENCOUNTER — TRANSCRIBE ORDERS (OUTPATIENT)
Dept: ADMINISTRATIVE | Facility: HOSPITAL | Age: 69
End: 2019-03-01

## 2019-03-01 DIAGNOSIS — Z12.39 SCREENING BREAST EXAMINATION: Primary | ICD-10-CM

## 2019-03-08 ENCOUNTER — TRANSCRIBE ORDERS (OUTPATIENT)
Dept: ADMINISTRATIVE | Facility: HOSPITAL | Age: 69
End: 2019-03-08

## 2019-03-08 DIAGNOSIS — R93.7 MULTIPLE LESIONS ON CT OF BRAIN AND SPINE: Primary | ICD-10-CM

## 2019-03-08 DIAGNOSIS — R93.0 MULTIPLE LESIONS ON CT OF BRAIN AND SPINE: Primary | ICD-10-CM

## 2019-03-08 DIAGNOSIS — R93.0 ABNORMAL HEAD CT: Primary | ICD-10-CM

## 2019-03-21 ENCOUNTER — OFFICE VISIT (OUTPATIENT)
Dept: HEMATOLOGY ONCOLOGY | Facility: MEDICAL CENTER | Age: 69
End: 2019-03-21
Payer: MEDICARE

## 2019-03-21 VITALS
RESPIRATION RATE: 16 BRPM | HEART RATE: 76 BPM | DIASTOLIC BLOOD PRESSURE: 62 MMHG | TEMPERATURE: 96.5 F | SYSTOLIC BLOOD PRESSURE: 108 MMHG | OXYGEN SATURATION: 98 %

## 2019-03-21 DIAGNOSIS — M89.9 BONE LESION: Primary | ICD-10-CM

## 2019-03-21 PROCEDURE — 99213 OFFICE O/P EST LOW 20 MIN: CPT | Performed by: INTERNAL MEDICINE

## 2019-03-21 NOTE — PROGRESS NOTES
Bear Black  [birth year]  [facility] [place] [id number]    DISCUSSION  SUMMARY:    41-year-old female with multiple medical problems recently found to have abnormalities on MRI T spine that were correlated with a bone scan  Issues:     1  T9 and 10 lesions seen previously  The repeat cervical spine lesions do not demonstrate any evidence of progression  Clinically patient is about the same as before  As with neuro radiology, I do not believe that a biopsy is necessary  The plan is to continue with surveillance  We rediscussed options  Patient is to return in 4 months with blood work and a thoracic MRI before  If no abnormalities were concerning findings, patient will then continue her follow-ups with Dr James Collazo  Mrs Lv Irving will alert the healthcare professionals at the institution if she has progressive/worsening back pain  2   Questionable left breast lesions  Recent bilateral mammogram and ultrasound were WNL  As before, the question has been that patient has an occult primary with thoracic spine metastases (no evidence for this at this time)      3  CBC abnormalities, mild anemia, thrombocytopenia and mild leukopenia in the past  The most recent CBC results are good/acceptable  Surveillance as per PCP      If Dr James Collazo or any other healthcare professional has any questions regarding this patient, my cell phone number is [phone]  Patient knows to call the hematology/oncology office if there are any other questions or concerns  Carefully review your medication list and verify that the list is accurate and up-to-date  Please call the hematology/oncology office if there are medications missing from the list, medications on the list that you are not currently taking or if there is a dosage or instruction that is different from how you're taking that medication      Patient goals and areas of care: Surveillance of the spine lesions  Barriers to care:  Patient has multiple comorbidities  Patient is not able to self-care   _____________________________________________________________________________________    Chief Complaint   Patient presents with    Follow-up     Thoracic spine lesions of unclear etiology     History of Present Illness:    28-year-old female previously referred for the above  Mrs Margaret Welsh has multiple sclerosis and is either bed or wheelchair bound  Patient was supposed to of undergone a biopsy of a vertebral body and return for follow-up to discuss options with the pathology report  The case was discussed with a number of physicians, most recently, Dr Gabbi Valdez, neuro radiology who did not feel that a biopsy was indicated  He did not believe that the lesions were consistent with a possible metastatic disease, more concerned about infection  He requested follow-up MRIs (listed below)  There has been no evidence of progression  Patient returns for follow-up and is accompanied by her sister  Mrs Margaret Welsh states okay, about the same as before  MS issues are about the same as before, mental status is about the same  Activities are extremely limited, weakness is the same as before  No recent back pain  Generalized body aches are the same as before  No GI,  or GYN issues  No recent infections or hospitalizations  No problems with excessive bruising or bleeding  Review of Systems   Constitutional: Positive for fatigue  HENT: Negative  Eyes: Negative  Respiratory: Negative  Cardiovascular: Negative  Gastrointestinal: Negative  Endocrine: Negative  Genitourinary: Negative  Musculoskeletal: Positive for arthralgias  Skin: Negative  Allergic/Immunologic: Negative  Neurological: Negative  Hematological: Negative  Psychiatric/Behavioral: Negative  All other systems reviewed and are negative      Patient Active Problem List   Diagnosis    Anemia  Thrombocytopenia (HCC)    Multiple sclerosis (HCC)    Seizure disorder (HCC)    Bilateral pleural effusion    Bradycardia    Abnormal MRI, thoracic spine    Chronic atrial fibrillation (HCC)    Bone lesion    Breast neoplasm    Cerebral infarction due to cerebral artery occlusion (HCC)    Cervical spinal stenosis    Depression    Essential hypertension    Lumbosacral spinal stenosis    Manic depressive disorder (HCC)    Mixed hyperlipidemia    Schizophrenia ([address] )    Hypothyroidism    Obstructive sleep apnea    Chronic kidney disease, stage 3 (HCC)    Vitamin D deficiency    SOB (shortness of breath)     Past Medical History:   Diagnosis Date    Abnormal posture     Acute respiratory failure with hypercapnia (HCC)     Arthropathy, unspecified     Atrial fibrillation (HCC)     Bipolar disorder (HCC)     Cerebral infarction due to thrombosis of other precerebral artery (HCC)     Cerebral infarction due to unspecified occlusion or stenosis of unspecified cerebral artery (HCC)     Chronic diastolic congestive heart failure (HCC)     Chronic kidney disease, stage III (moderate) (HCC)     Chronic pulmonary edema     Difficulty in walking, not elsewhere classified     Disease of thyroid gland     Dysphagia, oropharyngeal phase     Heart failure (HCC)     afibrillation    Hyperlipidemia     Hypertension     Irregular heart beat     Multiple sclerosis (HCC)     Muscle weakness (generalized)     Other cerebrovascular disease     Paralysis (HCC)     left hemiplegia    Primary generalized (osteo)arthritis     Psychiatric disorder     depression, schizophrenia    Schizophrenia ([address] )     Sepsis, unspecified organism ([address] )     Spinal stenosis, site unspecified     Stroke ([address] )     left hemiplegia    Thrombocytopenia, unspecified ([address] )      Past Surgical History:   Procedure Laterality Date    BACK SURGERY      2    CHOLECYSTECTOMY      COLONOSCOPY N/A 1/19/2017    Procedure: COLONOSCOPY;  Surgeon: Montse Jolly MD;  Location: [facility]; Service:     ESOPHAGOGASTRODUODENOSCOPY N/A 1/19/2017    Procedure: ESOPHAGOGASTRODUODENOSCOPY (EGD); Surgeon: Montse Jolly MD;  Location: [facility]; Service:     EYE MUSCLE SURGERY Left     HYSTERECTOMY       [address] CATARACT EXTRACAP,INSERT LENS Right 11/12/2018    Procedure: EXTRACTION EXTRACAPSULAR CATARACT PHACO INTRAOCULAR LENS (IOL); Surgeon: Dianne Bruner MD;  Location: [facility] MAIN OR;  Service: Ophthalmology     [address] CATARACT EXTRACAP,INSERT LENS Left 1/14/2019    Procedure: EXTRACTION EXTRACAPSULAR CATARACT PHACO INTRAOCULAR LENS (IOL); Surgeon: Dianne Bruner MD;  Location: [facility] MAIN OR;  Service: Ophthalmology    TONSILLECTOMY       Family History   Problem Relation Age of Onset    Diabetes Mother     Breast cancer Mother     Stroke Father     Heart attack Father     No Known Problems Son      Social History     Socioeconomic History    Marital status:       Spouse name: Not on file    Number of children: Not on file    Years of education: Not on file    Highest education level: Not on file   Occupational History    Not on file   Social Needs    Financial resource strain: Not on file    Food insecurity:     Worry: Not on file     Inability: Not on file    Transportation needs:     Medical: Not on file     Non-medical: Not on file   Tobacco Use    Smoking status: Never Smoker    Smokeless tobacco: Never Used   Substance and Sexual Activity    Alcohol use: No    Drug use: No    Sexual activity: Never   Lifestyle    Physical activity:     Days per week: Not on file     Minutes per session: Not on file    Stress: Not on file   Relationships    Social connections:     Talks on phone: Not on file     Gets together: Not on file     Attends Sabianism service: Not on file     Active member of club or organization: Not on file     Attends meetings of clubs or organizations: Not on file     Relationship status: Not on file    Intimate partner violence:     Fear of current or ex partner: Not on file     Emotionally abused: Not on file     Physically abused: Not on file     Forced sexual activity: Not on file   Other Topics Concern    Not on file   Social History Narrative    Not on file       Current Outpatient Medications:     acetaminophen (TYLENOL) 325 mg tablet, Take 650 mg by mouth every 6 (six) hours as needed for mild pain, Disp: , Rfl:     ARIPiprazole (ABILIFY) 20 MG tablet, Take 20 mg by mouth daily, Disp: , Rfl:     divalproex sodium (DEPAKOTE ER) 250 mg 24 hr tablet, Take 500 mg by mouth daily at bedtime  , Disp: , Rfl:     docusate sodium (COLACE) 100 mg capsule, Take 200 mg by mouth daily at bedtime, Disp: , Rfl:     furosemide (LASIX) 40 mg tablet, Take 1 tablet (40 mg total) by mouth daily, Disp: , Rfl: 0    levothyroxine 137 mcg tablet, Take 1 tablet (137 mcg total) by mouth every other day, Disp: , Rfl: 0    levothyroxine 150 mcg tablet, Take 150 mcg by mouth every other day, Disp: , Rfl:     metoprolol succinate (TOPROL-XL) 50 mg 24 hr tablet, Take 1 tablet (50 mg total) by mouth daily, Disp: 30 tablet, Rfl: 5    nitroglycerin (NITROSTAT) 0 4 mg SL tablet, Place 0 4 mg under the tongue every 5 (five) minutes as needed for chest pain, Disp: , Rfl:     polyethylene glycol (MIRALAX) 17 g packet, Take 17 g by mouth daily, Disp: , Rfl:     prednisoLONE acetate (PRED FORTE) 1 % ophthalmic suspension, Administer 1 drop into the left eye 4 (four) times a day, Disp: 5 mL, Rfl: 0    pregabalin (LYRICA) 50 mg capsule, Take 50 mg by mouth 2 (two) times a day, Disp: , Rfl:     rivaroxaban (XARELTO) 15 mg tablet, Take 15 mg by mouth daily with breakfast  , Disp: , Rfl:     senna-docusate sodium (SENOKOT S) 8 6-50 mg per tablet, Take 2 tablets by mouth daily  , Disp: , Rfl:     spironolactone (ALDACTONE) 25 mg tablet, Take 1 tablet (25 mg total) by mouth daily, Disp: , Rfl: 0    tamsulosin (FLOMAX) 0 4 mg, Take 0 8 mg by mouth daily at bedtime, Disp: , Rfl:     atorvastatin (LIPITOR) 10 mg tablet, Take 10 mg by mouth daily, Disp: , Rfl:     bisacodyl (FLEET) 10 MG/30ML ENEM, Insert 10 mg into the rectum once, Disp: , Rfl:     cholecalciferol 5000 units TABS, Take 1,000 Units by mouth daily (Patient not taking: Reported on 3/21/2019), Disp: 30 tablet, Rfl: 3    Cranberry 450 MG CAPS, Take 1 capsule by mouth daily  , Disp: , Rfl:     divalproex sodium (DEPAKOTE ER) 250 mg 24 hr tablet, Take 250 mg by mouth every morning  , Disp: , Rfl:     Allergies   Allergen Reactions    Ciprocinonide [Fluocinolone] Itching    Darvon [Propoxyphene] Itching    Keflex [Cephalexin] Rash    Lithium Itching    Penicillins Itching    Sulfa Antibiotics Hives    Zithromax [Azithromycin] Rash    Aspirin     Ethylenediamine Tetra-Acetate [Edetic Acid] Itching    Other      DAIRY       Vitals:    03/21/19 1045   BP: 108/62   Pulse: 76   Resp: 16   Temp: (!) 96 5 °F (35 8 °C)   SpO2: 98%     Physical Exam   Constitutional: She appears well-developed and well-nourished  Obese female, no respiratory distress, slightly more lethargic than usual   HENT:   Head: Normocephalic and atraumatic  Right Ear: External ear normal    Left Ear: External ear normal    Nose: Nose normal    Mouth/Throat: Oropharynx is clear and moist    Eyes: Pupils are equal, round, and reactive to light  Conjunctivae and EOM are normal    Neck: Normal range of motion  Neck supple  Limited range of motion but no different than before, nontender   Cardiovascular: Normal rate, regular rhythm, normal heart sounds and intact distal pulses  Pulmonary/Chest: Effort normal and breath sounds normal    Distant breath sounds bilaterally, scattered rhonchi   Abdominal: Soft  Bowel sounds are normal    Abdomen is soft, morbidly obese, cannot palpate liver or spleen, no rigidity or rebound, +bowel sounds   Musculoskeletal: Normal range of motion  Neurological:   Motor and sensory is significantly decreased in all extremities, patient's mental status seems less clear today, speech is slurred   Skin: Skin is warm  Relatively good color, warm, moist, no petechiae or ecchymoses   Psychiatric: She has a normal mood and affect  Her behavior is normal  Judgment and thought content normal    Extremities:  2+ bilateral lower extremity edema, no cords, pulses are 1+  Lymphatics:  No adenopathy in the neck, supraclavicular region, axilla and groin bilaterally    Labs    02/15/2019 BUN = 33 creatinine = 1 7 calcium = 9 2 LFTs WNL WBC = 6 9 hemoglobin = 13 hematocrit = 40 2 platelet = 519    62/47/5389 BUN = 31 creatinine = 1 4  09/5/18 BUN = 31 creatinine = 1 5 calcium = 8 6  08/24/2018 WBC = 6 8 hemoglobin = 10 8 hematocrit = 34 MCV = 96 platelet = 300    6/40/70 INR = 2 7  8/17/17 WBC = 5 2 hemoglobin = 13 7 hematocrit = 42 platelet = 185  8/3/31 platelet = 62  4/76/60 WBC = 3 6 hemoglobin = 12 9 hematocrit = 39 platelet = 98    Imaging    12/17/2018 MRI cervical spine    Stable mild chronic myelomalacia of the cervical cord at the C4-5 level  Previous anterior fusion at C4-5 and C5-6 stable with mild canal stenosis and foraminal narrowing  Stable posterior element hypertrophic change at the C6-7 level with partial calcification and thickening of the ligamentum flavum  Moderate canal stenosis with mild bilateral foraminal narrowing  Stable degenerative disc disease and facet degenerative change within the remaining cervical spine    12/17/2018 MRI thoracic spine    Improving marrow changes involving the midthoracic vertebral bodies, primarily involving T7 and T10  After reviewing prior studies including MRI and CT examinations, findings are consistent with healing fractures  No new marrow edema identified  No thoracic cord lesion to suggest active demyelinating disease    Stable mild thoracic degenerative change with small disc herniations, endplate and posterior element hypertrophic change  08/24/2018 MRI brain    Stable chronic white matter changes consistent with chronic demyelinating disease, possibly with superimposed chronic microangiopathic change  No acute ischemia, mass or hemorrhage  08/20/2018 CT scan of the chest and abdomen/pelvis    1  Bilateral pleural effusions, right greater than left, with lower lobe compressive atelectasis  Scattered groundglass infiltrates likely infectious or inflammatory  2   Trace ascites with mild anasarca in the lower abdominal wall and pelvis  3   Constipation  08/06/2018 MRI thoracic spine  Impression stated development of abnormal marrow at T6, T7 and T8  Persistent abnormal marrow at T10  Findings suspicious for metastatic disease  Correlation with repeat PET-CT may be warranted  03/22/2018 diagnostic bilateral mammogram was category 2, benign  03/22/2018 left breast ultrasound limited was category 2, benign  3/21/17 bilateral mammogram was category 2, benign  1/26/17 three-phase bone scan whole body demonstrated focal radiotracer activity in the lower thoracic spine corresponding to prior MRI abnormalities  Radiologist stated that the findings were nonspecific and inflammation/infection or metastases were possibilities  There were no additional lesions that were concerning for osseous metastatic disease  12/30/16 MRI thoracic spine: No intrinsic spinal cord disease was identified  Patient had advanced multilevel degenerative spondylosis  There was abnormal marrow at T10 and to a lesser degree T9 suspicious for metastatic disease

## 2019-03-22 ENCOUNTER — HOSPITAL ENCOUNTER (OUTPATIENT)
Dept: RADIOLOGY | Facility: HOSPITAL | Age: 69
Discharge: HOME | End: 2019-03-22
Attending: PSYCHIATRY & NEUROLOGY
Payer: MEDICARE

## 2019-03-22 ENCOUNTER — HOSPITAL ENCOUNTER (OUTPATIENT)
Dept: RADIOLOGY | Facility: HOSPITAL | Age: 69
End: 2019-03-22
Attending: PSYCHIATRY & NEUROLOGY
Payer: MEDICARE

## 2019-03-22 DIAGNOSIS — R93.7 MULTIPLE LESIONS ON CT OF BRAIN AND SPINE: ICD-10-CM

## 2019-03-22 DIAGNOSIS — R93.0 MULTIPLE LESIONS ON CT OF BRAIN AND SPINE: ICD-10-CM

## 2019-03-22 DIAGNOSIS — R93.0 ABNORMAL HEAD CT: ICD-10-CM

## 2019-03-22 PROCEDURE — 72156 MRI NECK SPINE W/O & W/DYE: CPT

## 2019-03-22 PROCEDURE — 70553 MRI BRAIN STEM W/O & W/DYE: CPT

## 2019-03-22 PROCEDURE — A9585 GADOBUTROL INJECTION: HCPCS | Performed by: PSYCHIATRY & NEUROLOGY

## 2019-03-22 RX ADMIN — GADOBUTROL 5 ML: 604.72 INJECTION INTRAVENOUS at 12:46

## 2019-03-25 ENCOUNTER — HOSPITAL ENCOUNTER (OUTPATIENT)
Dept: RADIOLOGY | Facility: HOSPITAL | Age: 69
Discharge: HOME | End: 2019-03-25
Attending: FAMILY MEDICINE
Payer: MEDICARE

## 2019-03-25 VITALS — HEIGHT: 65 IN | WEIGHT: 215 LBS | BODY MASS INDEX: 35.82 KG/M2

## 2019-03-25 DIAGNOSIS — Z12.39 SCREENING BREAST EXAMINATION: ICD-10-CM

## 2019-03-25 PROCEDURE — 77067 SCR MAMMO BI INCL CAD: CPT

## 2019-07-18 NOTE — PROGRESS NOTES
Patient prepped for transfer to room 208  Sister, made aware of transfer  Glasses sent  Report called to [address] [place] [address]  Topical Clindamycin Counseling: Patient counseled that this medication may cause skin irritation or allergic reactions.  In the event of skin irritation, the patient was advised to reduce the amount of the drug applied or use it less frequently.   The patient verbalized understanding of the proper use and possible adverse effects of clindamycin.  All of the patient's questions and concerns were addressed.

## 2019-07-24 ENCOUNTER — OFFICE VISIT (OUTPATIENT)
Dept: CARDIOLOGY CLINIC | Facility: CLINIC | Age: 69
End: 2019-07-24
Payer: MEDICARE

## 2019-07-24 VITALS — HEART RATE: 81 BPM | SYSTOLIC BLOOD PRESSURE: 110 MMHG | DIASTOLIC BLOOD PRESSURE: 74 MMHG | OXYGEN SATURATION: 97 %

## 2019-07-24 DIAGNOSIS — I10 ESSENTIAL HYPERTENSION: ICD-10-CM

## 2019-07-24 DIAGNOSIS — I48.20 ATRIAL FIBRILLATION, CHRONIC (HCC): Primary | ICD-10-CM

## 2019-07-24 PROCEDURE — 93000 ELECTROCARDIOGRAM COMPLETE: CPT | Performed by: INTERNAL MEDICINE

## 2019-07-24 PROCEDURE — 99214 OFFICE O/P EST MOD 30 MIN: CPT | Performed by: INTERNAL MEDICINE

## 2019-07-24 RX ORDER — IPRATROPIUM BROMIDE AND ALBUTEROL SULFATE .5; 3 MG/3ML; MG/3ML
3 SOLUTION RESPIRATORY (INHALATION) 4 TIMES DAILY
COMMUNITY

## 2019-07-24 NOTE — PATIENT INSTRUCTIONS
1  Because of progressive immobility and less responsiveness, suggest updating blood work, considering reduction in some of the psychotropic medications and trying to reduce polypharmacy  2  Continue current cardiovascular medication  3  Cardiology follow-up approximately 6 months with EKG

## 2019-07-24 NOTE — PROGRESS NOTES
Cardiology Progress Note    ASSESSMENT:    1   improved control of  chronic atrial fibrillation  2   Compensated chronic diastolic heart failure with previously unremarkable echocardiogram on 08/20/2018 and nonischemic nuclear stress test on 08/27/2018 with 65% LVEF   Bilateral pleural effusions were present, but no evidence of effusions at this time or on prior chest x-ray dated 10/10/2018  3   Stable chronic kidney disease, stage III with current estimated GFR 32 and creatinine of 1 6   4   Chronic idiopathic thrombocytopenia, mild  5   Presumed diagnosis of multiple sclerosis with cerebral demyelinating disease and chronic fatigue  Progressive immobility, lethargy, inability to perform activities of daily living, with consideration of adverse drug reaction  6   History of seizure disorder  7   Controlled essential hypertension  8   Mixed dyslipidemia  9   History of schizophrenia  10   Chronic hypothyroidism, treated  11   Presumed obstructive sleep apnea  12   Vitamin-D deficiency  13   History of healthcare-associated pneumonia  14   Episode of substernal chest pain about 3 weeks ago, probably representing esophageal reflux  15  Class 2 obesity  16  Lesions of T9-T10, being followed by Dr Juaquin Mitchell, presumed to represent metastatic disease, but stable, with his last examination on 03/21/2019  16  Status post left cataract extraction with IOL 01/14/2019 and right cataract extraction with IOL 11/12/2018   Plan       Patient Instructions     1  Because of progressive immobility and less responsiveness, suggest updating blood work, considering reduction in some of the psychotropic medications and trying to reduce polypharmacy  2  Continue current cardiovascular medication  3  Cardiology follow-up approximately 6 months with EKG  HPI    This 78 y o  female  denies new cardiopulmonary and medical symptoms        Review of Systems    All other systems negative, except as noted in history of present illness    Historical Information   Past Medical History:   Diagnosis Date    Abnormal posture     Acute respiratory failure with hypercapnia (HCC)     Arthropathy, unspecified     Atrial fibrillation (HCC)     Bipolar disorder (HCC)     Cerebral infarction due to thrombosis of other precerebral artery (HCC)     Cerebral infarction due to unspecified occlusion or stenosis of unspecified cerebral artery (HCC)     Chronic diastolic congestive heart failure (HCC)     Chronic kidney disease, stage III (moderate) (HCC)     Chronic pulmonary edema     Difficulty in walking, not elsewhere classified     Disease of thyroid gland     Dysphagia, oropharyngeal phase     Heart failure (HCC)     afibrillation    Hyperlipidemia     Hypertension     Irregular heart beat     Multiple sclerosis (HCC)     Muscle weakness (generalized)     Other cerebrovascular disease     Paralysis (HCC)     left hemiplegia    Primary generalized (osteo)arthritis     Psychiatric disorder     depression, schizophrenia    Schizophrenia [facility] [address] [facility]     Sepsis, unspecified organism ([facility] [address] )     Spinal stenosis, site unspecified     Stroke ([facility] [address] [facility]     left hemiplegia    Thrombocytopenia, unspecified ([facility] [address] )      Past Surgical History:   Procedure Laterality Date    BACK SURGERY      2    CHOLECYSTECTOMY      COLONOSCOPY N/A 1/19/2017    Procedure: COLONOSCOPY;  Surgeon: Joe Mott MD;  Location: [facility]; Service:     ESOPHAGOGASTRODUODENOSCOPY N/A 1/19/2017    Procedure: ESOPHAGOGASTRODUODENOSCOPY (EGD); Surgeon: Joe Mott MD;  Location: [facility]; Service:     EYE MUSCLE SURGERY Left     HYSTERECTOMY       [facility] CATARACT EXTRACAP,INSERT LENS Right 11/12/2018    Procedure: EXTRACTION EXTRACAPSULAR CATARACT PHACO INTRAOCULAR LENS (IOL);   Surgeon: Gogo Hudson MD;  Location: [facility] MAIN OR;  Service: Ophthalmology     [facility] CATARACT EXTRACAP,INSERT LENS Left 1/14/2019    Procedure: EXTRACTION EXTRACAPSULAR CATARACT PHACO INTRAOCULAR LENS (IOL); Surgeon: Roxana Da Silva MD;  Location: [facility] MAIN OR;  Service: Ophthalmology    TONSILLECTOMY       Social History     Substance and Sexual Activity   Alcohol Use No     Social History     Substance and Sexual Activity   Drug Use No     Social History     Tobacco Use   Smoking Status Never Smoker   Smokeless Tobacco Never Used       Family History:  Family History   Problem Relation Age of Onset    Diabetes Mother     Breast cancer Mother     Heart attack Mother     Stroke Father     Heart attack Father     No Known Problems Son          Meds/Allergies     Prior to Admission medications    Medication Sig Start Date End Date Taking?  Authorizing Provider   acetaminophen (TYLENOL) 325 mg tablet Take 650 mg by mouth every 6 (six) hours as needed for mild pain   Yes Historical Provider, MD   ARIPiprazole (ABILIFY) 20 MG tablet Take 20 mg by mouth daily   Yes Historical Provider, MD   atorvastatin (LIPITOR) 10 mg tablet Take 10 mg by mouth daily   Yes Historical Provider, MD   cholecalciferol 5000 units TABS Take 1,000 Units by mouth daily 9/24/18  Yes Murtaza De Leon MD   Cranberry 450 MG CAPS Take 1 capsule by mouth daily     Yes Historical Provider, MD   divalproex sodium (DEPAKOTE ER) 250 mg 24 hr tablet Take 250 mg by mouth 2 (two) times a day    Yes Historical Provider, MD   docusate sodium (COLACE) 100 mg capsule Take 200 mg by mouth daily at bedtime   Yes Historical Provider, MD   furosemide (LASIX) 40 mg tablet Take 1 tablet (40 mg total) by mouth daily 8/29/18  Yes Keely Cunningham, DO   ipratropium-albuterol, FOR EMS ONLY, (DUO-NEB) 0 5-2 5 mg/3 mL nebulizer solution 3 mL by Does not apply route 4 (four) times a day   Yes Historical Provider, MD   levothyroxine 137 mcg tablet Take 1 tablet (137 mcg total) by mouth every other day 8/29/18  Yes Anna Lorenzo, DO   levothyroxine 150 mcg tablet Take 150 mcg by mouth every other day   Yes Historical Provider, MD metoprolol succinate (TOPROL-XL) 50 mg 24 hr tablet Take 1 tablet (50 mg total) by mouth daily 1/24/19  Yes Alyssia Drake MD   nitroglycerin (NITROSTAT) 0 4 mg SL tablet Place 0 4 mg under the tongue every 5 (five) minutes as needed for chest pain   Yes Historical Provider, MD   polyethylene glycol (MIRALAX) 17 g packet Take 17 g by mouth daily   Yes Historical Provider, MD   pregabalin (LYRICA) 50 mg capsule Take 50 mg by mouth 2 (two) times a day   Yes Historical Provider, MD   rivaroxaban (XARELTO) 15 mg tablet Take 15 mg by mouth daily with breakfast     Yes Historical Provider, MD   senna-docusate sodium (SENOKOT S) 8 6-50 mg per tablet Take 2 tablets by mouth daily     Yes Historical Provider, MD   spironolactone (ALDACTONE) 25 mg tablet Take 1 tablet (25 mg total) by mouth daily 8/29/18  Yes Anna Lorenzo DO   tamsulosin (FLOMAX) 0 4 mg Take 0 8 mg by mouth daily at bedtime   Yes Historical Provider, MD   bisacodyl (FLEET) 10 MG/30ML ENEM Insert 10 mg into the rectum once    Historical Provider, MD   divalproex sodium (DEPAKOTE ER) 250 mg 24 hr tablet Take 500 mg by mouth daily at bedtime      Historical Provider, MD   prednisoLONE acetate (PRED FORTE) 1 % ophthalmic suspension Administer 1 drop into the left eye 4 (four) times a day  Patient not taking: Reported on 7/24/2019 1/14/19   Ishan Reynolds MD       Allergies   Allergen Reactions    Ciprocinonide [Fluocinolone] Itching    Darvon [Propoxyphene] Itching    Keflex [Cephalexin] Rash    Lithium Itching    Penicillins Itching    Sulfa Antibiotics Hives    Zithromax [Azithromycin] Rash    Aspirin     Ethylenediamine Tetra-Acetate [Edetic Acid] Itching    Other      DAIRY         Vitals:    07/24/19 1252   BP: 110/74   BP Location: Right arm   Patient Position: Sitting   Cuff Size: Large   Pulse: 81   SpO2: 97%       There is no height or weight on file to calculate BMI      Physical Exam:    General Appearance:  Lethargic, cooperative, no distress, appears stated age and is markedly obese   Head:  Normocephalic, without obvious abnormality, atraumatic   Eyes:  PERRL, conjunctiva/corneas clear, EOM's intact,   both eyes   Ears:  Normal TM's and external ear canals, both ears   Nose: Nares normal, septum midline, mucosa normal, no drainage or sinus tenderness   Throat: Lips, mucosa, and tongue normal; teeth and gums normal   Neck: Supple, symmetrical, trachea midline, no adenopathy, thyroid: not enlarged, symmetric, no tenderness/mass/nodules, no carotid bruit or JVD   Back:   Symmetric, no curvature, ROM normal, no CVA tenderness   Lungs:   Clear to auscultation bilaterally, respirations unlabored   Chest Wall:  No tenderness or deformity   Heart:  Irregularly irregular cardiac rhythm, S1, S2 normal, no murmur, rub or gallop   Abdomen:   Soft, non-tender, bowel sounds active all four quadrants,  no masses, no organomegaly with marked obesity noted   Extremities: Extremities normal, atraumatic, no cyanosis or edema   Pulses: 2+ and symmetric   Skin: Skin showed normal color, texture, turgor and no rashes or lesions   Lymph nodes: Cervical, supraclavicular, and axillary nodes normal   Neurologic: Normal         Cardiographics    ECG  07/24/2019:    Controlled atrial fibrillation with average ventricular rate 94 bpm  Anterolateral ST-T abnormalities, nonspecific, and increased compared to 10/24/2018  Slower ventricular rate compared to 01/24/2019      Imaging    Chest X-Ray :  No Chest XR results available for this patient            Lab Review     Outside laboratory data 02/15/2019:    BUN/creatinine 33/1 7 with estimated GFR 31 with otherwise normal CMP  CBC normal except for platelets of 937U      Lab Results   Component Value Date    SODIUM 141 08/28/2018    K 3 7 08/28/2018    CL 99 (L) 08/28/2018    CO2 37 (H) 08/28/2018    ANIONGAP 13 8 12/09/2015    BUN 36 (H) 08/28/2018    CREATININE 1 82 (H) 08/28/2018    GLUCOSE 84 12/09/2015    CALCIUM 9 1 08/28/2018    AST 21 08/19/2018    ALT 27 08/19/2018    ALKPHOS 111 08/19/2018    PROT 7 2 12/09/2015    BILITOT 0 4 12/09/2015    EGFR 28 08/28/2018       Lab Results   Component Value Date    CHOLESTEROL 131 08/02/2016    CHOLESTEROL 132 04/05/2016     Lab Results   Component Value Date    HDL 40 08/02/2016    HDL 39 (L) 04/05/2016    HDL 48 12/09/2015     No results found for: LDLCHOLEST  Lab Results   Component Value Date    LDLCALC 55 08/02/2016    LDLCALC 62 04/05/2016    LDLCALC 76 12/09/2015     No components found for: [facility]  Lab Results   Component Value Date    TRIG 180 (H) 08/02/2016    TRIG 154 (H) 04/05/2016    TRIG 179 12/09/2015         Lab Results   Component Value Date    GLUCOSE 84 12/09/2015    CALCIUM 9 1 08/28/2018     12/09/2015    K 3 7 08/28/2018    CO2 37 (H) 08/28/2018    CL 99 (L) 08/28/2018    BUN 36 (H) 08/28/2018    CREATININE 1 82 (H) 08/28/2018           Leander Esteves MD

## 2019-07-29 ENCOUNTER — HOSPITAL ENCOUNTER (OUTPATIENT)
Dept: RADIOLOGY | Facility: HOSPITAL | Age: 69
Discharge: HOME | End: 2019-07-29
Attending: INTERNAL MEDICINE
Payer: MEDICARE

## 2019-07-29 DIAGNOSIS — M89.9 BONE LESION: ICD-10-CM

## 2019-07-29 PROCEDURE — 72157 MRI CHEST SPINE W/O & W/DYE: CPT

## 2019-07-29 PROCEDURE — A9585 GADOBUTROL INJECTION: HCPCS | Performed by: INTERNAL MEDICINE

## 2019-07-29 RX ADMIN — GADOBUTROL 10 ML: 604.72 INJECTION INTRAVENOUS at 11:13

## 2019-09-17 ENCOUNTER — OFFICE VISIT (OUTPATIENT)
Dept: HEMATOLOGY ONCOLOGY | Facility: MEDICAL CENTER | Age: 69
End: 2019-09-17
Payer: MEDICARE

## 2019-09-17 VITALS
TEMPERATURE: 96.2 F | DIASTOLIC BLOOD PRESSURE: 70 MMHG | OXYGEN SATURATION: 93 % | HEART RATE: 50 BPM | SYSTOLIC BLOOD PRESSURE: 112 MMHG | RESPIRATION RATE: 16 BRPM

## 2019-09-17 DIAGNOSIS — M89.9 BONE LESION: Primary | ICD-10-CM

## 2019-09-17 PROCEDURE — 99213 OFFICE O/P EST LOW 20 MIN: CPT | Performed by: INTERNAL MEDICINE

## 2019-09-17 RX ORDER — POTASSIUM CHLORIDE 750 MG/1
10 CAPSULE, EXTENDED RELEASE ORAL 2 TIMES DAILY
COMMUNITY

## 2019-09-17 NOTE — PROGRESS NOTES
[facility]  0/27/0686  [address] [facility] [place] [id number]    DISCUSSION  SUMMARY:    62-year-old female with multiple medical problems recently found to have abnormalities on MRI T spine that were correlated with a bone scan  Issues:     T9 and 10 lesions seen previously  The repeat MRI/thoracic spine is listed below  There are no clear signs concerning for a malignancy (although there are a number of degenerative issues)  I do not believe that continual MRIs looking for evidence of malignancy are needed  Other MRI abnormalities can be addressed as per patient's PCP  Questionable left breast lesions  Recent bilateral mammogram and ultrasound were WNL  As before, the question has been that patient has an occult primary with thoracic spine metastases (no evidence for this at this time)  Patient should continue with yearly mammograms      Previous CBC abnormalities, mild anemia, thrombocytopenia and mild leukopenia in the past  The most recent CBC results are good/acceptable  No additional specific hematology workup is necessary  Patient's decline  Sister was most concerned that Mrs Harriet Ponce seems to be declining mentally and physically  I have told the sister and patient that I cannot attribute these issues to any hematology or oncology concerns  Sister stated that Dr Martine Hoffman told her that the MS is not progressing also  I will defer these issues to Dr Lucy Jacome      If Dr Lucy Jacome or any other healthcare professional has any questions regarding this patient, my cell phone number is [phone]  Patient/sister know to call the hematology/oncology office if there are any other questions or concerns  Carefully review your medication list and verify that the list is accurate and up-to-date   Please call the hematology/oncology office if there are medications missing from the list, medications on the list that you are not currently taking or if there is a dosage or instruction that is different from how you're taking that medication  Patient goals and areas of care: Follow up with PCP  Barriers to care:  Patient has multiple comorbidities  Patient is not able to self-care   _____________________________________________________________________________________    Chief Complaint   Patient presents with    Follow-up     Questionable thoracic lesion     History of Present Illness:    16-year-old female previously referred for the above  Mrs Jose Mcnamara has multiple sclerosis and is either bed or wheelchair bound  Patient was supposed to of undergone a biopsy of a vertebral body and return for follow-up to discuss options with the pathology report  The case was discussed with a number of physicians, most recently, Dr Remy Keller, neuro radiology who did not feel that a biopsy was indicated  He did not believe that the lesions were consistent with a possible metastatic disease, more concerned about infection  He requested follow-up MRIs (listed below)  Patient returns for follow-up and is accompanied by her sister  Mrs Jose Mcnamara was very sleepy today  Patient was pleasant and responsive but fell asleep a number of times during the interview  Patient denied any pain control issues including back pain  Appetite is okay, no GI or  issues  Activities are extremely limited, same as before  Patient is now on CPAP - having difficulty wearing it at night  No problems with excessive bruising or bleeding  Review of Systems   Constitutional: Positive for fatigue  Excessive sleepiness   HENT: Negative  Eyes: Negative  Respiratory: Negative  Cardiovascular: Negative  Gastrointestinal: Negative  Endocrine: Negative  Genitourinary: Negative  Musculoskeletal: Negative for arthralgias  Skin: Negative  Allergic/Immunologic: Negative  Neurological: Negative  Hematological: Negative  Psychiatric/Behavioral: Negative  All other systems reviewed and are negative      Patient Active Problem List   Diagnosis    Anemia    Thrombocytopenia (HCC)    Multiple sclerosis (HCC)    Seizure disorder (HCC)    Bilateral pleural effusion    Bradycardia    Abnormal MRI, thoracic spine    Chronic atrial fibrillation (HCC)    Bone lesion    Breast neoplasm    Cerebral infarction due to cerebral artery occlusion (HCC)    Cervical spinal stenosis    Depression    Essential hypertension    Lumbosacral spinal stenosis    Manic depressive disorder ([address] )    Mixed hyperlipidemia    Schizophrenia ([address] )    Hypothyroidism    Obstructive sleep apnea    Chronic kidney disease, stage 3 (HCC)    Vitamin D deficiency    SOB (shortness of breath)     Past Medical History:   Diagnosis Date    Abnormal posture     Acute respiratory failure with hypercapnia ([facility])     Arthropathy, unspecified     Atrial fibrillation (HCC)     Bipolar disorder (HCC)     Cerebral infarction due to thrombosis of other precerebral artery (HCC)     Cerebral infarction due to unspecified occlusion or stenosis of unspecified cerebral artery (HCC)     Chronic diastolic congestive heart failure (HCC)     Chronic kidney disease, stage III (moderate) ([facility])     Chronic pulmonary edema     Difficulty in walking, not elsewhere classified     Disease of thyroid gland     Dysphagia, oropharyngeal phase     Heart failure ([facility])     afibrillation    Hyperlipidemia     Hypertension     Irregular heart beat     Multiple sclerosis (HCC)     Muscle weakness (generalized)     Other cerebrovascular disease     Paralysis (HCC)     left hemiplegia    Primary generalized (osteo)arthritis     Psychiatric disorder     depression, schizophrenia    Schizophrenia ([address] )     Sepsis, unspecified organism ([address] )     Spinal stenosis, site unspecified     Stroke ([address] )     left hemiplegia    Thrombocytopenia, unspecified (HCC)      Past Surgical History:   Procedure Laterality Date    BACK SURGERY      2    CHOLECYSTECTOMY      COLONOSCOPY N/A 1/19/2017    Procedure: COLONOSCOPY;  Surgeon: Mauricio Jimenez MD;  Location: [facility] GI LAB; Service:     ESOPHAGOGASTRODUODENOSCOPY N/A 1/19/2017    Procedure: ESOPHAGOGASTRODUODENOSCOPY (EGD); Surgeon: Mauricio Jimenez MD;  Location: [facility] GI LAB; Service:     EYE MUSCLE SURGERY Left     HYSTERECTOMY       [facility] CATARACT EXTRACAP,INSERT LENS Right 11/12/2018    Procedure: EXTRACTION EXTRACAPSULAR CATARACT PHACO INTRAOCULAR LENS (IOL); Surgeon: Krystle Sainz MD;  Location: [facility] MAIN OR;  Service: Ophthalmology     [facility] CATARACT EXTRACAP,INSERT LENS Left 1/14/2019    Procedure: EXTRACTION EXTRACAPSULAR CATARACT PHACO INTRAOCULAR LENS (IOL); Surgeon: Krystle Sainz MD;  Location: [facility] MAIN OR;  Service: Ophthalmology    TONSILLECTOMY       Family History   Problem Relation Age of Onset    Diabetes Mother     Breast cancer Mother     Heart attack Mother     Stroke Father     Heart attack Father     No Known Problems Son      Social History     Socioeconomic History    Marital status:       Spouse name: Not on file    Number of children: Not on file    Years of education: Not on file    Highest education level: Not on file   Occupational History    Not on file   Social Needs    Financial resource strain: Not on file    Food insecurity:     Worry: Not on file     Inability: Not on file    Transportation needs:     Medical: Not on file     Non-medical: Not on file   Tobacco Use    Smoking status: Never Smoker    Smokeless tobacco: Never Used   Substance and Sexual Activity    Alcohol use: No    Drug use: No    Sexual activity: Never   Lifestyle    Physical activity:     Days per week: Not on file     Minutes per session: Not on file    Stress: Not on file   Relationships    Social connections:     Talks on phone: Not on file     Gets together: Not on file     Attends Taoism service: Not on file     Active member of club or organization: Not on file     Attends meetings of clubs or organizations: Not on file     Relationship status: Not on file    Intimate partner violence:     Fear of current or ex partner: Not on file     Emotionally abused: Not on file     Physically abused: Not on file     Forced sexual activity: Not on file   Other Topics Concern    Not on file   Social History Narrative    Not on file       Current Outpatient Medications:     acetaminophen (TYLENOL) 325 mg tablet, Take 650 mg by mouth every 6 (six) hours as needed for mild pain, Disp: , Rfl:     ARIPiprazole (ABILIFY) 20 MG tablet, Take 20 mg by mouth daily, Disp: , Rfl:     atorvastatin (LIPITOR) 10 mg tablet, Take 10 mg by mouth daily, Disp: , Rfl:     cholecalciferol 5000 units TABS, Take 1,000 Units by mouth daily, Disp: 30 tablet, Rfl: 3    Cranberry 450 MG CAPS, Take 1 capsule by mouth daily  , Disp: , Rfl:     divalproex sodium (DEPAKOTE ER) 250 mg 24 hr tablet, Take 500 mg by mouth daily at bedtime  , Disp: , Rfl:     divalproex sodium (DEPAKOTE ER) 250 mg 24 hr tablet, Take 250 mg by mouth 2 (two) times a day , Disp: , Rfl:     docusate sodium (COLACE) 100 mg capsule, Take 200 mg by mouth daily at bedtime, Disp: , Rfl:     furosemide (LASIX) 40 mg tablet, Take 1 tablet (40 mg total) by mouth daily, Disp: , Rfl: 0    ipratropium-albuterol, FOR EMS ONLY, (DUO-NEB) 0 5-2 5 mg/3 mL nebulizer solution, 3 mL by Does not apply route 4 (four) times a day, Disp: , Rfl:     levothyroxine 137 mcg tablet, Take 1 tablet (137 mcg total) by mouth every other day, Disp: , Rfl: 0    levothyroxine 150 mcg tablet, Take 150 mcg by mouth every other day, Disp: , Rfl:     metoprolol succinate (TOPROL-XL) 50 mg 24 hr tablet, Take 1 tablet (50 mg total) by mouth daily, Disp: 30 tablet, Rfl: 5    nitroglycerin (NITROSTAT) 0 4 mg SL tablet, Place 0 4 mg under the tongue every 5 (five) minutes as needed for chest pain, Disp: , Rfl:     polyethylene glycol (MIRALAX) 17 g packet, Take 17 g by mouth daily, Disp: , Rfl:     potassium chloride (MICRO-K) 10 MEQ CR capsule, Take 10 mEq by mouth 2 (two) times a day, Disp: , Rfl:     pregabalin (LYRICA) 50 mg capsule, Take 50 mg by mouth 2 (two) times a day, Disp: , Rfl:     rivaroxaban (XARELTO) 15 mg tablet, Take 15 mg by mouth daily with breakfast  , Disp: , Rfl:     senna-docusate sodium (SENOKOT S) 8 6-50 mg per tablet, Take 2 tablets by mouth daily  , Disp: , Rfl:     spironolactone (ALDACTONE) 25 mg tablet, Take 1 tablet (25 mg total) by mouth daily, Disp: , Rfl: 0    tamsulosin (FLOMAX) 0 4 mg, Take 0 8 mg by mouth daily at bedtime, Disp: , Rfl:     bisacodyl (FLEET) 10 MG/30ML ENEM, Insert 10 mg into the rectum once, Disp: , Rfl:     prednisoLONE acetate (PRED FORTE) 1 % ophthalmic suspension, Administer 1 drop into the left eye 4 (four) times a day (Patient not taking: Reported on 7/24/2019), Disp: 5 mL, Rfl: 0    Allergies   Allergen Reactions    Ciprocinonide [Fluocinolone] Itching    Darvon [Propoxyphene] Itching    Keflex [Cephalexin] Rash    Lithium Itching    Penicillins Itching    Sulfa Antibiotics Hives    Zithromax [Azithromycin] Rash    Aspirin     Ethylenediamine Tetra-Acetate [Edetic Acid] Itching    Other      DAIRY       Vitals:    09/17/19 0851   BP: 112/70   Pulse: (!) 50   Resp: 16   Temp: (!) 96 2 °F (35 7 °C)   SpO2: 93%     Physical Exam   Constitutional: She appears well-developed and well-nourished  Obese female, no respiratory distress, sleepy   HENT:   Head: Normocephalic and atraumatic  Right Ear: External ear normal    Left Ear: External ear normal    Nose: Nose normal    Mouth/Throat: Oropharynx is clear and moist    Eyes: Pupils are equal, round, and reactive to light  Conjunctivae and EOM are normal    Neck: Normal range of motion  Neck supple     Limited range of motion but no different than before, nontender Cardiovascular: Normal rate, regular rhythm, normal heart sounds and intact distal pulses  Pulmonary/Chest: Effort normal and breath sounds normal    Fair entry bilaterally, decreased breath sounds bilaterally, otherwise clear   Abdominal: Soft  Bowel sounds are normal    Morbidly obese, +bowel sounds, nontender, cannot palpate liver or spleen   Musculoskeletal: Normal range of motion  Neurological:   Motor and sensory is significantly decreased in all extremities - about the same as before, patient's mental status is decreased as compared to 1 year ago, more lethargic   Skin: Skin is warm  Good color, warm, moist, no petechiae or ecchymoses   Psychiatric: She has a normal mood and affect  Her behavior is normal  Judgment and thought content normal    Extremities:  0-1+ bilateral lower extremity edema, no cords, pulses are 1+  Lymphatics:  No adenopathy in the neck, supraclavicular region, axilla and groin bilaterally    Labs    09/04/2019 WBC = 6 8 hemoglobin = 12 9 hematocrit = 41 MCV = 92 platelet = 907 BUN = 25 creatinine = 1 4 calcium = 9 8 LFTs WNL    02/15/2019 BUN = 33 creatinine = 1 7 calcium = 9 2 LFTs WNL WBC = 6 9 hemoglobin = 13 hematocrit = 40 2 platelet = 039  69/13/2616 BUN = 31 creatinine = 1 4  09/5/18 BUN = 31 creatinine = 1 5 calcium = 8 6  08/24/2018 WBC = 6 8 hemoglobin = 10 8 hematocrit = 34 MCV = 96 platelet = 878  9/64/83 INR = 2 7  8/17/17 WBC = 5 2 hemoglobin = 13 7 hematocrit = 42 platelet = 055  9/5/06 platelet = 62  1/69/10 WBC = 3 6 hemoglobin = 12 9 hematocrit = 39 platelet = 98    Imaging    07/29/2019 MRI thoracic spine    THORACIC DEGENERATIVE CHANGE:  There is multifocal thoracic degenerative change  Loss of disc height with mild annular bulging  Mild endplate and posterior element hypertrophic change is present  There are small central and paracentral disc   protrusions seen within the thoracic canal, the largest of which is seen at the T9-10 level    At T11-12 there is slightly more pronounced endplate and posterior element hypertrophic change  Overall there is mild canal stenosis without cord compression  There is multilevel foraminal narrowing, most pronounced at the T11-12 level, moderate on the left and severe on the right  These changes appear stable compared to the prior examination  IMPRESSION:  Diffuse thoracic degenerative disc disease with endplate and posterior element hypertrophic change  Small disc protrusions  Foraminal narrowing and canal stenosis most pronounced at the T11-12 level without cord compression      Endplate marrow changes noted within the mid to lower thoracic spine from T7 through T10 continues to demonstrate improvement  Currently there is no marrow edema identified  12/17/2018 MRI cervical spine    Stable mild chronic myelomalacia of the cervical cord at the C4-5 level  Previous anterior fusion at C4-5 and C5-6 stable with mild canal stenosis and foraminal narrowing  Stable posterior element hypertrophic change at the C6-7 level with partial calcification and thickening of the ligamentum flavum  Moderate canal stenosis with mild bilateral foraminal narrowing  Stable degenerative disc disease and facet degenerative change within the remaining cervical spine    12/17/2018 MRI thoracic spine    Improving marrow changes involving the midthoracic vertebral bodies, primarily involving T7 and T10  After reviewing prior studies including MRI and CT examinations, findings are consistent with healing fractures  No new marrow edema identified  No thoracic cord lesion to suggest active demyelinating disease  Stable mild thoracic degenerative change with small disc herniations, endplate and posterior element hypertrophic change  08/24/2018 MRI brain    Stable chronic white matter changes consistent with chronic demyelinating disease, possibly with superimposed chronic microangiopathic change    No acute ischemia, mass or hemorrhage  08/20/2018 CT scan of the chest and abdomen/pelvis    1  Bilateral pleural effusions, right greater than left, with lower lobe compressive atelectasis  Scattered groundglass infiltrates likely infectious or inflammatory  2   Trace ascites with mild anasarca in the lower abdominal wall and pelvis  3   Constipation  08/06/2018 MRI thoracic spine  Impression stated development of abnormal marrow at T6, T7 and T8  Persistent abnormal marrow at T10  Findings suspicious for metastatic disease  Correlation with repeat PET-CT may be warranted  03/22/2018 diagnostic bilateral mammogram was category 2, benign  03/22/2018 left breast ultrasound limited was category 2, benign  3/21/17 bilateral mammogram was category 2, benign  1/26/17 three-phase bone scan whole body demonstrated focal radiotracer activity in the lower thoracic spine corresponding to prior MRI abnormalities  Radiologist stated that the findings were nonspecific and inflammation/infection or metastases were possibilities  There were no additional lesions that were concerning for osseous metastatic disease  12/30/16 MRI thoracic spine: No intrinsic spinal cord disease was identified  Patient had advanced multilevel degenerative spondylosis  There was abnormal marrow at T10 and to a lesser degree T9 suspicious for metastatic disease

## 2020-02-05 ENCOUNTER — TELEPHONE (OUTPATIENT)
Dept: OTHER | Facility: OTHER | Age: 70
End: 2020-02-05

## 2023-10-29 NOTE — PROGRESS NOTES
Progress Note - Critical Care   Ronda Tenorio 70 y o  female MRN: [id number]  Unit/Bed#: ICU 03 Encounter: [id number]    Impression:  Principal Problem:    HCAP (healthcare-associated pneumonia)  Active Problems:    Sepsis ([facility] )    Toxic metabolic encephalopathy    Leukopenia    Thrombocytopenia (HCC)    Multiple sclerosis (HCC)    Seizure disorder (HCC)    Bilateral pleural effusion    Hypothermia    Bradycardia    Atrial fibrillation ([facility] )    Essential hypertension    Manic depressive disorder (HCC)    Mixed hyperlipidemia    Schizophrenia ([place] [address] )    Subclinical hypothyroidism    Hypoglycemia    Hypoglycemia   Assessment & Plan    Uncertain etiology  -s/p multiple doses of Dextrose  -Currently on D10W w/ stable sugars        Subclinical hypothyroidism   Assessment & Plan    · Cont Levothyroxine  · TSH elevated @ 7 > T4 > 1 4 wnl        Schizophrenia ([facility] )   Assessment & Plan    -Chronically on abilify and depakote        Mixed hyperlipidemia   Assessment & Plan    · Restart home statin        Manic depressive disorder ([facility] )   Assessment & Plan    · Cont Depakote   · Abilify currently held        Essential hypertension   Assessment & Plan    · Hold Norvasc for now, restart when BP's increased        Atrial fibrillation (HCC)   Assessment & Plan    · Cont Xarelto  · Controlled rate        Bradycardia   Assessment & Plan    -hold amiodarone due to bradycardia, can resume once non bradycardic  -asymptomatic        Hypothermia   Assessment & Plan    · Unclear if related to hypothyroid or sepsis  · initiated passive rewarming measures with fouzia Cavazos improved this morning        Bilateral pleural effusion   Assessment & Plan    · uncertain cause at this time  · questionable parapneumonic versus possibly related to processes for which patient is being worked up as an outpatient ?   Possible metastatic cancer lesions  · diuresed in the emergency department with 40 mg IV Lasix  · CXR improved overall but still with B/L Effusions > continue diuresis as able         Seizure disorder (HCC)   Assessment & Plan    · Unclear history of possible Seizure disorder with history of "convulsions" in pt chart  · Subtherapeutic depakote levels  · Pt meds have been held due to poor mentation and ability to swallow        Multiple [facility] ([facility] )   Assessment & Plan    · patient has a history of multiple sclerosis  · not actively treated at this time  · Supportive care          Thrombocytopenia ([facility] )   Assessment & Plan    · acute on chronic thrombocytopenia > chronic component ?  Related to possible malignancy  · likely related to sepsis  · no active bleeding  · Trend platelets for improvement        Leukopenia   Assessment & Plan    · likely sepsis related  · Cont antibx  · F/u cultures        Toxic metabolic encephalopathy   Assessment & Plan    · possibly sepsis related, though this is acute on chronic by history > improved from admission : persistent  · TSH, depakote, levels wnl  · depakote and Abilify have been held w/o significant improvement  · Consider MRI today of brain         Sepsis [facility])   Assessment & Plan    · secondary to probable hospital-acquired pneumonia, suspect Gram-negative, covering for both pneumococcal and MRSA pneumonia        * HCAP (healthcare-associated pneumonia)   Assessment & Plan    · treating for pneumococcus as well as gram-negative pneumonia, also covering for anaerobic components for aspiration risk, and MRSA coverage- cont Cefepime and Flagyl  · likely treatment for approximately 5-7 days, will trend procalcitonin and taper duration and antibiotic therapy to cultures and sensitivities  · Bipap off, tolerating NC, wean O2 as tolerated  · Albuterol nebs prn        Acute pulmonary edema (HCC)resolved as of 8/20/2018   Assessment & Plan    · possible heart failure was given 40 mg Lasix in ED and nitroglycerin with improvement of hypoxia  · Check echocardiogram  · trend urine output  · BiPAP no off, will wean Name band; O2 as tolerated        Acute respiratory failure with hypoxia and hypercapnia (HCC)resolved as of 8/20/2018   Assessment & Plan    · improved oxygen saturation and respiratory acidosis with BiPAP  · off BiPAP this morning, tolerating NC  · Wean O2 as tolerated            Assessment / Plan :  Neuro:  -CAM ICU positive:  Delirium Precautions  -Pain /Sedation:  None required / Sedation day none required  / SAT today   -Neuro Checks:  frequent  CV/Heme/VTE PPx:  -Hemoglobin :  stable /  Platelets:  stable  / Coagulation:  On Xarelto  - Chemical VTE PPX :  Xarelto  / SCDs  Pulm:  -I/S v  Flutter Valve / Pulm Toilet  GI/Endo:    -Glycemic Control:  Continuing dextrose infusion > continue to trend glucose levels  -Bowel Reg:  Senna  / PRN Laxatives if required  -Stress Ulcer PPx :  None required  -Nutrition:  Requires speech eval    /FEN:  -IVF :  Net Bal:  -313 / Goal :  -1L > consider diuresis again today   -Electrolytes:  goal K/M/PH :  4, 2, 3 :  Replete as necessary   -Arriola:  yes /  day 2  ID:  -Trend Fever and WBC curve  -Current role for abx:   Sepsis 2/2 HCAP suspect GN PNA /  Abx Day 2  /  -Cultures:  Sputum, blood, urine pending > f/u procalcitonine  MSK/Mobility/ PT OT/ Lines:  -MSK:  Routine Turning, Offloading, Skin care  -OOB / Ambulatory  as early as possible  -PTOT / CM and Rehab Assessment   -Access:  PIV:  yes /  Central Access:  no /  day no  /  arterial catheter:  Day no  Consultants:  None current   Dispo/Family:   ICU care / Patient and Family Updated:  Sister/GRETCHEN Garcia updated last night  Treatment Team/Consultants: [facility]     Date of admission: 8/18/2018    Reason for Admission: altered mentation / hypoxia     HPI/24hr events: remained hypoglycemic overnight > increased D5 infusion to D 10 with stablized glucoses    Physical Exam:    General Appearance:  NAD, altered, morbidly obese  HENT: NCAT   Neck: supple, non deviated  noCVC  Eyes: perrl, no icterus      Cardiac: regular rate and chronically irregular rhythm, no murmur, no rub  Pulmonary: diminished  to auscultation bilaterally, + secretions  Gastrointestinal: obese, soft / nontender, no distention,  + BS  : Arriola present: yes   Musculoskeletal: 1+ edema bilaterally  Neuro:  AAO x 3, COELHO, slightly weak on left noted to be chronic  Psych: Mood and affect slow  Skin: intact  Vitals:   Vitals:    18 0100 18 0200 18 0300 18 0400   BP: 118/60 115/57 122/55 111/55   Pulse: 55 59 56 (!) 54   Resp: (!) 10 (!) 10 (!) 11 (!) 10   Temp:    98 4 °F (36 9 °C)   TempSrc:       SpO2:       Weight:       Height:                 Temperature: Temp (24hrs), Av °F (36 7 °C), Min:96 6 °F (35 9 °C), Max:99 4 °F (37 4 °C)  Current: Temperature: 98 4 °F (36 9 °C)    Weights: IBW: 57 kg  Body mass index is 43 22 kg/m²  Respiratory:  SpO2: SpO2: 98 %  O2 Flow Rate (L/min): 3 L/min    Intake and Outputs:    Intake/Output Summary (Last 24 hours) at 18 0602  Last data filed at 18 0519   Gross per 24 hour   Intake          1066 67 ml   Output             1380 ml   Net          -313 33 ml     I/O last 24 hours: In: 2916 7 [P O :200; I V :416 7; IV Piggyback:2300]  Out: 2170 [Urine:2730]      Nutrition:        Diet Orders            Start     Ordered    18 1155  Diet NPO; Sips with meds  Diet effective now     Question Answer Comment   Diet Type NPO    NPO Except: Sips with meds    RD to adjust diet per protocol?  No        18 1155          Labs:     Results from last 7 days  Lab Units 18  0503 18  1915   WBC Thousand/uL 2 74* 3 57*   HEMOGLOBIN g/dL 10 1* 11 4*   HEMATOCRIT % 32 6* 37 1   PLATELETS Thousands/uL 52* 47*   NEUTROS PCT % 65 71   MONOS PCT % 12 8       Results from last 7 days  Lab Units 18  0519 18  0503 18  1915   SODIUM mmol/L 147* 149* 148*   POTASSIUM mmol/L 4 7 4 9 5 3   CHLORIDE mmol/L 114* 114* 112*   CO2 mmol/L 26 25 27   BUN mg/dL 41* 46* 46*   CREATININE mg/dL 1 96* 1 68* 1 76*   CALCIUM mg/dL 8 8 8 8 9 6   TOTAL PROTEIN g/dL  --  6 5 7 5   BILIRUBIN TOTAL mg/dL  --  0 60 0 70   ALK PHOS U/L  --  111 141*   ALT U/L  --  27 31   AST U/L  --  21 26   GLUCOSE RANDOM mg/dL 83 49* 78       Results from last 7 days  Lab Units 18  0519 18  0503   MAGNESIUM mg/dL 2 2 1 8       Results from last 7 days  Lab Units 18  0519 18  0503   PHOSPHORUS mg/dL 3 7 3 8        Results from last 7 days  Lab Units 18   INR  1 14   PTT seconds 39*       Results from last 7 days  Lab Units 18   LACTIC ACID mmol/L 0 9       Results from last 7 days  Lab Units 18   TROPONIN I ng/mL <0 02       Results from last 7 days  Lab Units 18  1554 18  0510 18  0001   PH ART  7 320* 7 333* 7 357 7 289*   PCO2 ART mm Hg 46 9* 46 6* 43 5 50 5*   PO2 ART mm Hg 76 0 75 5 90 6 99 4   HCO3 ART mmol/L 23 6 24 2 23 9 23 7   BASE EXC ART mmol/L -2 6 -1 8 -1 6 -3 1   ABG SOURCE  Radial, Right Brachial, Right Brachial, Right Brachial, Right           Results from last 7 days  Lab Units 18   TSH 3RD GENERATON uIU/mL 7 805*   FREE T4 ng/dL 1 41       Imagin18 CXR: pending    Micro:   Blood Culture:   Lab Results   Component Value Date    BLOODCX No Growth at 24 hrs  2018    BLOODCX No Growth at 24 hrs  2018     Urine Culture:   Lab Results   Component Value Date    URINECX >100,000 cfu/ml Mixed Contaminants X6 2016    URINECX >100,000 cfu/ml Pseudomonas aeruginosa 2016     Sputum Culture: No components found for: SPUTUMCX  Wound Culure: No results found for: [place]ta Myra    Results from last 7 days  Lab Units 18   BLOOD CULTURE   --  No Growth at 24 hrs  No Growth at 24 hrs  LEGIONELLA URINARY ANTIGEN  Negative  --        Allergies:    Allergies   Allergen Reactions    Ciprocinonide [Fluocinolone] Itching    Darvon [Propoxyphene] Itching    Keflex [Cephalexin] Rash    Lithium Itching    Penicillins Itching    Sulfa Antibiotics Hives    Zithromax [Azithromycin] Rash    Ethylenediamine Tetra-Acetate [Edetic Acid] Itching       Medications:   Scheduled Meds:    Current Facility-Administered Medications:  albuterol 2 5 mg Nebulization Q4H PRN Sherrye Lot, CRNP    atorvastatin 10 mg Oral Daily Angelica Jaeger, PA-C    cefepime 2,000 mg Intravenous Q12H Angelica Jaeger, PA-C Last Rate: Stopped (08/19/18 2232)   dextran 70-hypromellose 1 drop Both Eyes BID Sherrye Lot, CRNP    dextrose 25 mL Intravenous PRN Angelica Gallowayek, PA-C    dextrose 50 mL/hr Intravenous Continuous Angelica Jaeger, PA-C Last Rate: 50 mL/hr (08/19/18 2206)   divalproex sodium 250 mg Oral Daily Angelica Jaeger, PA-C    divalproex sodium 500 mg Oral HS Angelica Jaeger, PA-C    levothyroxine 137 mcg Oral Early Morning Angelica Jaeger, PA-C    metroNIDAZOLE 500 mg Intravenous Q8H Angelica Jaeger, PA-C Last Rate: 500 mg (08/20/18 0519)   nystatin 1 application Topical TID nAgelica Jaeger, PA-C    rivaroxaban 15 mg Oral Daily With Breakfast Sherrye Lot, BOBNP    senna-docusate sodium 2 tablet Oral Daily Sherrye Lot, CRNP    tamsulosin 0 8 mg Oral HS Sherrye Lot, CRNP    thiamine 100 mg Oral Daily Angelica Jaeger, PA-C      Continuous Infusions:    dextrose 50 mL/hr Last Rate: 50 mL/hr (08/19/18 2206)     PRN Meds:    albuterol 2 5 mg Q4H PRN   dextrose 25 mL PRN       Invasive lines and devices: Invasive Devices     Peripheral Intravenous Line            Peripheral IV 08/18/18 Left Antecubital 1 day    Peripheral IV 08/18/18 Left Forearm 1 day          Drain            Urethral Catheter Non-latex 16 Fr  1 day                Code Status: Level 3 - DNAR and DNI    Counseling / Coordination of Care  Total time spent today 30 minutes  Greater than 50% of total time was spent with the patient and / or family counseling and / or coordination of care   A description of the counseling / coordination of care: exam / Freddie Boo / Kiera Hernandez / coordination      SIGNATURE: Macrina Santillan PA-C  DATE: August 20, 2018  TIME: 6:02 AM

## 2024-06-27 NOTE — CONSULTS
[facility]   Neurology Initial Consult    Joey Garcia is a 70 y o  female  [address] [place] obtained from:   Chief Complaint   Patient presents with    Chest Pain    Shortness of Breath     started at 3 pm today         Assessment/Plan:    1  Encephalopathy   2  Metastatic spine lesions   3  History of MS      Patient's history is limited by her clinical status  Her physical baseline is poor at baseline however her cognition reportedly was better  She has evidence of metastatic lesion in spine  Will get MRI brain to look for metastatic lesion in brain  Will check spep, upep, vit b12, D, tpo ab  Will get EEG to look for epileptiform discharges  I don't believe her clinical worsening is due to MS at present  Will discuss plan with primary team      ADDENDUM:  MRI brain is negative for acute process  EEG shows mild background slowing  HPI:  Joey Garcia is a 69 yo F with h/o MS for 40 years is being evaluated for ams  She had initially presented with sob and cyanosis  Patient is a poor historian  Per previous report by her sister, she has been becoming 'less responsive' for past 2-3 months  She speaks unintelligibly and slurs her speech at times  Over the past few days she has been treated for pneumonia, pulmonary edema and despite her mentation has not improved much  Early this month, patient had thoracic MRI for back pain which had revealed abnormal marrow signal from T6-T10 suggestive of metastatic lesions  She follows with heme/onc who would like to get biopsy but and do more work up to look for primary tumor however patient is refusing  Patient says she has left sided hemiplegia from prior stroke  Due to progressive MS, she has very little movement of right side  She also says she was admitted for dysphagia  She has h/o seizure disorder and is on depakote   Her depakote and ammonia levels were wnl         Past Medical History: Pt is here today for a BP check. Vitals are as follows.  Sitting   136/82  hr: 65  LT  arm       large cuff      Pt denies CP, HA or SOB.  Patient states since starting using the clonidine patch BP is also better at home     Diagnosis Date    Atrial fibrillation ([facility] )     Bipolar disorder ([facility] )     Disease of thyroid gland     Heart failure ([facility] )     afibrillation    Hyperlipidemia     Hypertension     Irregular heart beat     Multiple sclerosis (HCC)     Paralysis (HCC)     left hemiplegia    Psychiatric disorder     depression, schizophrenia    Schizophrenia ([facility] )     Stroke ([place] [address] )     left hemiplegia       Past Surgical History:   Procedure Laterality Date    BACK SURGERY      2    CHOLECYSTECTOMY      COLONOSCOPY N/A 1/19/2017    Procedure: COLONOSCOPY;  Surgeon: Izabela Smith MD;  Location: [facility] GI LAB; Service:     ESOPHAGOGASTRODUODENOSCOPY N/A 1/19/2017    Procedure: ESOPHAGOGASTRODUODENOSCOPY (EGD); Surgeon: Izabela Smith MD;  Location: [facility] GI LAB;   Service:     EYE MUSCLE SURGERY Left     HYSTERECTOMY      TONSILLECTOMY         Allergies   Allergen Reactions    Ciprocinonide [Fluocinolone] Itching    Darvon [Propoxyphene] Itching    Keflex [Cephalexin] Rash    Lithium Itching    Penicillins Itching    Sulfa Antibiotics Hives    Zithromax [Azithromycin] Rash    Ethylenediamine Tetra-Acetate [Edetic Acid] Itching         Current Facility-Administered Medications:     acetaminophen (TYLENOL) tablet 650 mg, 650 mg, Oral, Q6H PRN, HAI Oro    albuterol inhalation solution 2 5 mg, 2 5 mg, Nebulization, Q4H PRN, Chapis June, BOBNP, 2 5 mg at 08/21/18 1431    atorvastatin (LIPITOR) tablet 10 mg, 10 mg, Oral, Daily, Tarun Slipper, PA-C, 10 mg at 08/23/18 0910    dextran 70-hypromellose (GENTEAL TEARS) 0 1-0 3 % ophthalmic solution 1 drop, 1 drop, Both Eyes, BID, Chapis June, CRNP, 1 drop at 08/23/18 0912    dextrose 50 % IV solution 25 mL, 25 mL, Intravenous, PRN, Tarun Terryper, PA-C, 25 mL at 08/19/18 1911    divalproex sodium (DEPAKOTE ER) 24 hr tablet 250 mg, 250 mg, Oral, Daily, Billey Slipper, PA-C, 250 mg at 08/23/18 0912    divalproex sodium (DEPAKOTE ER) 24 hr tablet 500 mg, 500 mg, Oral, HS, King Taylor PA-C, 500 mg at 08/23/18 2221    furosemide (LASIX) injection 20 mg, 20 mg, Intravenous, Q12H, HAI Oro, 20 mg at 08/23/18 2221    [START ON 8/25/2018] levothyroxine tablet 137 mcg, 137 mcg, Oral, Every Other Day, Nicole Dickinson MD    [START ON 8/24/2018] levothyroxine tablet 150 mcg, 150 mcg, Oral, Every Other Day, Nicole Dickinson MD    nystatin (MYCOSTATIN) powder 1 application, 1 application, Topical, TID, King Taylor PA-C, 1 application at 09/30/25 2222    rivaroxaban (XARELTO) tablet 15 mg, 15 mg, Oral, Daily With Breakfast, HAI Molina, 15 mg at 08/23/18 8122    senna-docusate sodium (SENOKOT S) 8 6-50 mg per tablet 1 tablet, 1 tablet, Oral, BID, HAI Molina, 1 tablet at 08/23/18 2221    spironolactone (ALDACTONE) tablet 25 mg, 25 mg, Oral, Daily, HAI Hope, 25 mg at 08/23/18 1456    tamsulosin (FLOMAX) capsule 0 8 mg, 0 8 mg, Oral, HS, HAI Molina, 0 8 mg at 08/23/18 2221    thiamine (VITAMIN B1) tablet 100 mg, 100 mg, Oral, Daily, King Taylor PA-C, 100 mg at 08/23/18 9801    Social History     Social History    Marital status:      Spouse name: N/A    Number of children: N/A    Years of education: N/A     Occupational History    Not on file  Social History Main Topics    Smoking status: Never Smoker    Smokeless tobacco: Never Used    Alcohol use No    Drug use: No    Sexual activity: Not on file     Other Topics Concern    Not on file     Social History Narrative    No narrative on file       History reviewed  No pertinent family history  Review of systems:  Please see HPI for positive symptoms  No fever, no chills, no weight change  +ams Ocular: No drainage, no blurred vision  HEENT:  No sore throat, earache, or congestion  No neck pain  COR:  No chest pain  No palpitations   Lungs:  + sob, wheezing,  GI:  no  nausea, no vomiting, no diarrhea, no constipation, no anorexia  :  No dysuria, frequency, or urgency  No hematuria  Musculoskeletal:  No joint pain or swelling or edema  Skin:  No rash or itching  Psychiatric:  no anxiety, + depression  Endocrine:  No polyuria or polydipsia  Physical examination:  Vitals:    08/23/18 2153   BP: 127/59   Pulse: 71   Resp: 18   Temp: 99 6 °F (37 6 °C)   SpO2: 97%       GENERAL APPEARANCE:  The patient is alert, oriented  She is in no acute distress  HEENT:  Head is normocephalic  The sinuses are otherwise nontender  Pupils are equal and reactive  NECK:  Supple without lymphadenopathy  HEART:  Regular rate and rhythm  LUNGS:  clear to auscultation  No crackles or wheezes are heard  ABDOMEN:  Soft, nontender, nondistended with good bowel sounds heard  EXTREMITIES:  Without cyanosis, clubbing or edema  Mental status: The patient is alert, attentive, and oriented x2  Speech is slow but fluent, good repetition, comprehension, and naming  she recalls 1/3 objects at 5 minutes  Cranial nerves:  CN II: Visual fields are full to confrontation  Fundoscopic exam is normal with sharp discs and no vascular changes    Pupils are 3 mm and  reactive to light  CN III, IV, VI: At primary gaze, there is no eye deviation  CN V: Facial sensation is intact to pinprick in all 3 divisions bilaterally  Corneal responses are intact  CN VII: Face is symmetric with normal eye closure and smile  CN VIII: Hearing is normal to rubbing fingers  CN IX, X: Palate elevates symmetrically  Phonation is normal   CN XI: Head turning and shoulder shrug are intact  CN XII: Tongue is midline with normal movements and no atrophy  Motor: There is no pronator drift of out-stretched arms    Muscle bulk and tone are normal      Muscle exam:  UE: 2/5  RLE: 2/5, LLE: 1/5        Reflexes   RJ BJ TJ KJ AJ Plantars Ramirez's   Right 2+ 2+ 2+ 2+ 0 Downgoing Not present   Left 2+ 2+ 2+ 2+ 0 Downgoing Not present     Sensory:  Severely diminished in LE  Coordination:  Rapid alternating movements and fine finger movements are very slow  Mirza Blade for coordination  There are no abnormal or extraneous movements  Romberg kamila  Gait/Stance:  Non ambulatory at baseline  Lab Results   Component Value Date    WBC 6 19 08/23/2018    HGB 11 2 (L) 08/23/2018    HCT 36 0 08/23/2018    MCV 99 (H) 08/23/2018    PLT 81 (L) 08/23/2018     No results found for: HGBA1C  Lab Results   Component Value Date    ALT 27 08/19/2018    AST 21 08/19/2018    ALKPHOS 111 08/19/2018    BILITOT 0 60 08/19/2018     Lab Results   Component Value Date    GLUCOSE 82 08/23/2018    CALCIUM 9 3 08/23/2018     08/23/2018    K 3 7 08/23/2018    CO2 32 08/23/2018     08/23/2018    BUN 29 (H) 08/23/2018    CREATININE 1 60 (H) 08/23/2018         Radiology          Review of reports:  Ct Chest Abdomen Pelvis Wo Contrast    Result Date: 8/20/2018  1  Bilateral pleural effusions, right greater than left, with lower lobe compressive atelectasis  Scattered groundglass infiltrates likely infectious or inflammatory  2   Trace ascites with mild anasarca in the lower abdominal wall and pelvis  3   Constipation  Workstation performed: [id number]     X-ray Chest 1 View Portable    Result Date: 8/18/2018  Prominent central vasculature and bilateral airspace disease likely on the basis of pulmonary edema  Small bibasilar pleural effusions  Workstation performed: [id number]     Ct Head Without Contrast    Result Date: 8/18/2018  No acute intracranial abnormality  Microangiopathic changes  Workstation performed: [id number]     Xr Chest Portable Icu    Result Date: 8/22/2018  Mild improvement in congestive changes with bibasilar densities and pleural effusions  Workstation performed: [id number]     Xr Chest Portable Icu    Result Date: 8/20/2018  Bilateral pleural effusions with bibasilar airspace disease and pulmonary congestion    There has been no interval improvement Workstation performed: [id number]     Xr Chest Portable Icu    MRI thoracic spine:   Result Date: 8/20/2018  No significant interval change since 8/18/2018 Workstation performed: [id number]     Development of abnormal marrow at T6, T7 and T8  Persistent abnormal marrow at T10  Findings suspicious for metastatic tumor  Correlation with repeat PET/CT may be warranted      Persistent advanced multilevel degenerative spondylosis     Development of bilateral pleural effusions          Thank you for this consult  Total time of encounter: 60 min  More than 50% of time was spent in counseling and coordination of care of patient  GENNARO [facility]  [place] [phone]  [address] [place] [address]

## 2024-12-16 NOTE — ASSESSMENT & PLAN NOTE
Continue BiPAP q h s  Occupational Therapy    Visit Type: initial evaluation and treatment  SUBJECTIVE  \"I got all stiff.  I felt like I ws shaking on the inside\"  Patient / Family Goal: return to previous functional status and maximize function    Pain     Location: [facility]    At onset of session (out of 10): 7    OBJECTIVE     Cognitive Status   Orientation    - Oriented to: person, place, time and situation  Functional Communication   - Overall Communication Status: within functional limits   - Forms of Communication: verbal  Attention Span    - Attention: impaired   - Attention impairment: distractibility  Following Direction   - follows all commands and directions consistently  Memory   - intact  Awareness of Deficits   - assistance required to compensate for deficits     Range of Motion (ROM)   (degrees unless noted; active unless noted; norms in ( ); negative=lacking to 0, positive=beyond 0)  Shoulder:    - Flexion (180):         Left:75           Right: 75  Elbow/Forearm:    - Flexion (140-150):       Left:  WFL        Right:  WFL   Wrist:   - Flexion (60-80):       Left: WFL        Right: WFL   Fingers:      Left: wfl      Right: wfl    Strength  (out of 5 unless noted, standard test position unless noted)   Elbow/Forearm:    - Flexion:         Left: 4         Right: 4   Wrist:    - Flexion:         Left: 4         Right: 4  Gross :  strength grossly equal bilateral      Sitting Balance  (LANCE = base of support)  Static      - Trial 1 details: supervision  Dynamic      - Trial 1 details: minimal assist    Standing Balance  (LANCE = base of support)  Firm Surface: Double Leg      - Static, Eyes Open       - Trial 1 details: total assist      Coordination  LUE: intact   RUE: intact      Sensation/Dermatome Testing:    Intact: LUE light touch, RUE light touch  Muscle Tone  LUE: WFL  RUE: WFL      Bed Mobility  - Rolling left: stand by assist  - Rolling right: stand by assist  - Repositioning in bed: total assist - dependent  -  Supine to sit: minimal assist  - Sit to supine: maximal assist  Transfers  Assistive devices: gait belt, 2-wheeled walker  - Sit to stand: maximal assist  - Stand to sit: total assist - dependent  -  Bed to chair       - total assist - dependent      Activities of Daily Living (ADLs)  Eating:   - Assist: set up  - Position: chair  Grooming/Oral Hygiene:   - Grooming assist: set up       - Oral hygiene assist: set up  - Position: chair  Upper Body Dressing:  - Assist: minimal assist  Lower Body Dressing:   - Assist: maximal assist  - Footwear:       - Assistance: maximal assist  Toileting:   - Assist: total assist - dependent     Interventions    Treatment provided: activity tolerance, ADL training, balance retraining, bed mobility training, compensatory techniques, body mechanics, safety training and transfer training  Skilled input: verbal instruction/cues and tactile instruction/cues  Verbal Consent: Writer verbally educated and received verbal consent for hand placement, positioning of patient, and techniques to be performed today from patient for clothing adjustments for techniques and therapist position for techniques as described above and how they are pertinent to the patient's plan of care.         Education:   - Present and ready to learn: patient  Education provided during session:  - Results of above outlined education: Needs reinforcement    ASSESSMENT   Patient will benefit from inpatient skilled therapy to address current assessed functional limitations and impairments.  Interfering components: decreased activity tolerance, medical precautions and medical status limitations    Discharge needs based on today's assessment:  - Current level of function: significantly below baseline level of function  - Therapy needs at discharge: therapy 5 or more times per week  - Activities of daily living (ADLs) requiring support at discharge: grooming, dressing, transfers, bed mobility, bathing and toileting  -  Impairments that require further therapy intervention: pain, ROM, strength, activity tolerance, safety awareness, balance and coordination  Patient admitted after fall with right fibula fracture, tall cam boot to be worn, weight bearing as tolerated.  Patient complaint of pain 7/10.  Patient typically independent, uses rollator.  At present, patient is minimal assist to edge of bed, assist x2 to stand and get to chair.  Patient gets \"shaky\".  Maximal assist lower extremity self care, set up for oral facial hygiene and feeding in chair.    AM-PAC Cognition Screen:  Cognition Score: 20/24  Cognition Interpretation: current level of function is consistent with baseline    AM-PAC  - Prior Level of Function: IND/MOD I ([facility] 22-24)       Key: MOD A=moderate assistance, IND/MOD I=independent/modified independent  - Generalized Current Level of Function     - Current Self-Cares: 14       Scoring Key= >21 Modified Independent; 20-21 Supervision; 18-19 Minimal assist; 13-17 Moderate assist; 9-12 Max assist; <9 Total assist        Personal Occupations Profile Affected: bathing/showering, functional mobility/transfers, toileting/toilet hygiene, personal hygiene/grooming, feeding, upper body dressing, lower body dressing      Clinical decision making: Moderate - Patient has several limitations (3-5), comorbidities and/or complexities, as noted in detailed assessment above, that impact their occupational profile.  Resulting in several treatment options and minimal to moderate task modification consistent with moderate clinical decision making complexity.    PLAN (while hospitalized)  Suggestions for next session as indicated:     OT Frequency: 3-5 x per week      PT/OT ADL Equipment for Discharge: to be determined at next level of care  Interventions: ADL retraining, functional transfer training, activity tolerance training, compensatory technique education, balance, community reintegration, transfer training and compensatory  techniques  Agreement to plan and goals: patient agrees with goals and treatment plan      GOALS  Long Term Goals: (to be met by time of discharge from hospital)    Patient to improve toilet transfer to moderate assist  Patient to improve oral facial hygiene to stand by assist, standing  Patient to improve lower extremity dressing to minimal assist    Documented in the chart in the following areas: Prior Function. Assessment/Plan.    Patient at End of Session:   Location: in chair  Safety measures: alarm system in place/re-engaged, call light within reach, equipment intact and lines intact  Handoff to: nurse      Therapy procedure time and total treatment time can be found documented on the Time Entry flowsheet

## (undated) DEVICE — DISPOSABLE BIOPSY VALVE MAJ-1555: Brand: SINGLE USE BIOPSY VALVE (STERILE)

## (undated) DEVICE — BRUSH ENDO CLEANING DBL-HEADER

## (undated) DEVICE — "MH-443 SUCTION VALVE F/EVIS140 EVIS160": Brand: SUCTION VALVE

## (undated) DEVICE — GAUZE SPONGES,16 PLY: Brand: CURITY

## (undated) DEVICE — "MB-142 MOUTHPIECE": Brand: MOUTHPIECE

## (undated) DEVICE — LUBRICANT SURGILUBE TUBE 4 OZ  FLIP TOP

## (undated) DEVICE — SOLIDIFIER FLUID WASTE CONTROL 1500ML

## (undated) DEVICE — B-H IRRIGATING CAN 19GA FLAT ANGLED 8MM: Brand: OPHTHALMIC CANNULA

## (undated) DEVICE — TUBING BUBBLE CLEAR 5MM X 100 FT NS

## (undated) DEVICE — 1200CC GUARDIAN II: Brand: GUARDIAN

## (undated) DEVICE — AIRLIFE™  ADULT CUSHION NASAL CANNULA WITH 7 FOOT (2.1 M) CRUSH-RESISTANT OXYGEN TUBING, AND U/CONNECT-IT ADAPTER: Brand: AIRLIFE™

## (undated) DEVICE — AIR INJECT CANNULA 27GA: Brand: OPHTHALMIC CANNULA

## (undated) DEVICE — SEALANT RESURE

## (undated) DEVICE — CLEARCUT® SLIT KNIFE INTREPID MICRO-COAXIAL SYSTEM 2.4 SB: Brand: CLEARCUT®; INTREPID

## (undated) DEVICE — MICROSURGICAL INSTRUMENT IRR. CYSTITOME 25GA STRAIGHT-REVERSE CUTTING: Brand: ALCON

## (undated) DEVICE — EYE PACK CUSTOM -FINNEGAN

## (undated) DEVICE — TUBING AUX CHANNEL

## (undated) DEVICE — INTREPID® TRANSFORMER IA HP: Brand: INTREPID®

## (undated) DEVICE — ACTIVE FMS W/ INTREPID* ULTRA SLEEVES, 0.9MM 45° ABS* INTREPID* BALANCED TIP: Brand: ALCON

## (undated) DEVICE — "MAJ-901 WATER CONTAINER SET CV-160/140": Brand: WATER CONTAINER

## (undated) DEVICE — MEDI-VAC YANKAUER SUCTION HANDLE: Brand: CARDINAL HEALTH

## (undated) DEVICE — GLOVE EXAM NON-STRL NTRL PLUS LRG PF

## (undated) DEVICE — "MH-438 A/W VLVE F/140 EVIS-140": Brand: AIR/WATER VALVE

## (undated) DEVICE — BITE BLOCK ENDO 60FR ADLT MAXI  DISP W/STRAP

## (undated) DEVICE — THE MONARCH® "D" CARTRIDGE IS A SINGLE-USE POLYPROPYLENE CARTRIDGE FOR POSTERIOR CHAMBER IOL DELIVERY: Brand: MONARCH® III

## (undated) DEVICE — GLOVE SRG BIOGEL 7.5